# Patient Record
Sex: FEMALE | Race: BLACK OR AFRICAN AMERICAN | NOT HISPANIC OR LATINO | URBAN - METROPOLITAN AREA
[De-identification: names, ages, dates, MRNs, and addresses within clinical notes are randomized per-mention and may not be internally consistent; named-entity substitution may affect disease eponyms.]

---

## 2017-05-18 ENCOUNTER — ALLSCRIPTS OFFICE VISIT (OUTPATIENT)
Dept: OTHER | Facility: OTHER | Age: 37
End: 2017-05-18

## 2017-05-18 DIAGNOSIS — Z12.39 ENCOUNTER FOR OTHER SCREENING FOR MALIGNANT NEOPLASM OF BREAST: ICD-10-CM

## 2017-07-31 ENCOUNTER — HOSPITAL ENCOUNTER (OUTPATIENT)
Dept: MAMMOGRAPHY | Facility: MEDICAL CENTER | Age: 37
Discharge: HOME | End: 2017-07-31
Payer: COMMERCIAL

## 2017-07-31 DIAGNOSIS — Z12.39 ENCOUNTER FOR OTHER SCREENING FOR MALIGNANT NEOPLASM OF BREAST: ICD-10-CM

## 2017-07-31 PROCEDURE — G0202 SCR MAMMO BI INCL CAD: HCPCS

## 2017-08-01 ENCOUNTER — GENERIC CONVERSION - ENCOUNTER (OUTPATIENT)
Dept: OTHER | Facility: OTHER | Age: 37
End: 2017-08-01

## 2017-08-01 DIAGNOSIS — Z12.31 ENCOUNTER FOR SCREENING MAMMOGRAM FOR MALIGNANT NEOPLASM OF BREAST: ICD-10-CM

## 2017-08-01 DIAGNOSIS — R92.2 INCONCLUSIVE MAMMOGRAM: ICD-10-CM

## 2017-08-14 ENCOUNTER — GENERIC CONVERSION - ENCOUNTER (OUTPATIENT)
Dept: OTHER | Facility: OTHER | Age: 37
End: 2017-08-14

## 2018-01-11 NOTE — MISCELLANEOUS
Message   Date: 20 Oct 2016 8:55 AM EST, Recorded By: Que Kwong For: Roldan Mendoza   Caller: Zhang Armstrong, Virgilio   Phone: [phone] (Home)   Reason: Medical Complaint   Patient [facility]  She went there this am for abdominal pain  It is more in the middle pelvic area with vomiting  It just started this am  The Dr at [facility] advised to go to ER to be further evaluated and to get CT Scan  Patient called our office to be seen  I advised with her symptoms she should indeed go to ER  I advised it could be a number of things and that a CT Scan in the ER would be done quicker and she will have an answer sooner  Patient agreed and understood  Active Problems    1  Acute urinary tract infection (599 0) (N39 0)   2  Diet controlled gestational diabetes mellitus in third trimester (648 83) (O24 410)   3  Elderly primigravida, antepartum, third trimester (659 53) (O09 513)   4  Lower abdominal pain (789 09) (R10 30)   5  PPD screening test (V74 1) (Z11 1)   6  Pregnancy (V22 2) (Z33 1)    Current Meds   1  Ortho Tri-Cyclen Lo 0 18/0 215/0 25 MG-25 MCG Oral Tablet; Therapy: (Recorded:20Oct2016) to Recorded   2  Prenatal Vitamins TABS; TAKE 1 TABLET DAILY; Therapy: (Recorded:23Jun2015) to Recorded    Allergies    1  No Known Drug Allergies    2  Seasonal    Signatures   Electronically signed by : Marie Deleon, ; Oct 20 2016  8:59AM EST                       (Author)    Electronically signed by :  Kena Mcdonald DO; Oct 20 2016  1:12PM EST                       (Author)

## 2018-01-14 VITALS
HEIGHT: 70 IN | WEIGHT: 223.2 LBS | BODY MASS INDEX: 31.95 KG/M2 | DIASTOLIC BLOOD PRESSURE: 70 MMHG | RESPIRATION RATE: 18 BRPM | SYSTOLIC BLOOD PRESSURE: 106 MMHG | HEART RATE: 76 BPM | TEMPERATURE: 98.2 F

## 2018-01-15 NOTE — MISCELLANEOUS
Message  Message Free Text Note Form: FYI - patient called on-call provider at 11:45 PM  She states that she was awoken at 6 PM with urinary urgency and significant pain with urination  No blood in her urine  No fevers or chills  Patient did not have any antecedent symptoms until now  Patient states that she does have a history of frequent urinary tract infections  She was given a prescription for Keflex to be used after having sexual intercourse  Patient recently had a baby and is presently nursing  I advised that it is not usually office policy or procedure to call in antibiotics however given the current lateness of the evening and the fact that she is nursing an infant I would rather her not sit in an ER to wait to be treated for an uncomplicated urinary tract infection  Prescription for Keflex called into [facility] pharmacy which her  will   Patient was advised to increase her hydration  This will not be an issue with breast-feeding  She was advised that if her symptoms do not improve or worsening then she will need to call to the office for an appointment  Plan    1   Cephalexin 500 MG Oral Capsule; TAKE 1 CAPSULE 3 TIMES DAILY UNTIL GONE    Signatures   Electronically signed by : Brice Estrada DO; Feb 17 2016 12:11AM EST                       (Author)

## 2018-01-18 NOTE — RESULT NOTES
Discussion/Summary   Mammogram is stable- breasts are dense- repeat in one year  Verified Results  * MAMMO SCREENING BILATERAL W CAD [id number] 01:21PM Jose Carlos Coles Order Number: [id number]    - Patient Instructions: To schedule this appointment, please contact Central Scheduling at [phone]  Do not wear any perfume, powder, lotion or deodorant on breast or underarm area  Please bring your doctors order, referral (if needed) and insurance information with you on the day of the test  Failure to bring this information may result in this test being rescheduled  Arrive 15 minutes prior to your appointment time to register  On the day of your test, please bring any prior mammogram or breast studies with you that were not performed at a [facility]  Failure to bring prior exams may result in your test needing to be rescheduled  Test Name Result Flag Reference   MAMMO SCREENING BILATERAL W CAD (Report)     Patient History:   Patient had first child at age 38  Family history of breast cancer under age 48 in mother, breast    cancer at age 48 or over in paternal grandmother, ovarian cancer    under age 48 in maternal aunt  Patient has never smoked  Patient's BMI is 31 8  Reason for exam: screening, asymptomatic  Mammo Screening Bilateral W CAD: July 31, 2017 - Check In #:    [de-identified]   Bilateral CC and MLO view(s) were taken  Technologist: ROSA Gomez (ROSA)(M)   The breast tissue is heterogeneously dense, potentially limiting    the sensitivity of mammography  Patient risk, included in this    report, assists in determining the appropriate screening regimen    (such as 3-D mammography or the inclusion of automated breast    ultrasound or MRI)  3-D mammography may also remain indicated as    screening  Bilateral digital mammography was performed as a baseline study      No dominant soft tissue mass, architectural distortion or    suspicious calcifications are noted in either breast   The skin    and nipple contours are within normal limits  No evidence of malignancy  ACR BI-RADSï¾® Assessments: BiRad:1 - Negative     Recommendation:   Routine screening mammogram of both breasts in 1 year  Analyzed by CAD     The patient is scheduled in a reminder system  8-10% of cancers will be missed on mammography  Management of a    palpable abnormality must be based on clinical grounds  Patients   will be notified of their results via letter from our facility  Accredited by [employer] of [employer] and FDA  Transcription Location: [facility]: [id number]     Risk Value(s):   Tyrer-Cuzick 10 Year: 6 200%, Tyrer-Cuzick Lifetime: 42 100%,    Myriad Table: 7 2%, ENOC 5 Year: 0 7%, NCI Lifetime: 15 1%, MRS    : Based on personal and/or family history,    consideration of hereditary risk assessment may be warranted     Signed by:   Maura Herrera MD   7/31/17       Plan  Dense breast tissue on mammogram, Encounter for screening breast examination    · * MAMMO SCREENING BILATERAL W CAD; Status:Hold For - Scheduling; Requested  for:[id number];

## 2018-02-13 ENCOUNTER — OFFICE VISIT (OUTPATIENT)
Dept: FAMILY MEDICINE CLINIC | Facility: CLINIC | Age: 38
End: 2018-02-13
Payer: COMMERCIAL

## 2018-02-13 VITALS
HEIGHT: 70 IN | BODY MASS INDEX: 33.93 KG/M2 | SYSTOLIC BLOOD PRESSURE: 112 MMHG | HEART RATE: 60 BPM | RESPIRATION RATE: 18 BRPM | TEMPERATURE: 98.2 F | WEIGHT: 237 LBS | DIASTOLIC BLOOD PRESSURE: 72 MMHG

## 2018-02-13 DIAGNOSIS — E01.0 THYROMEGALY: ICD-10-CM

## 2018-02-13 DIAGNOSIS — E55.9 VITAMIN D DEFICIENCY: ICD-10-CM

## 2018-02-13 DIAGNOSIS — F41.8 SITUATIONAL ANXIETY: Primary | ICD-10-CM

## 2018-02-13 DIAGNOSIS — Z82.49 FAMILY HISTORY OF BRAIN ANEURYSM: ICD-10-CM

## 2018-02-13 PROCEDURE — 99214 OFFICE O/P EST MOD 30 MIN: CPT | Performed by: INTERNAL MEDICINE

## 2018-02-13 RX ORDER — CEPHALEXIN 250 MG/1
CAPSULE ORAL
Refills: 12 | COMMUNITY
Start: 2018-01-21 | End: 2018-02-13

## 2018-02-13 RX ORDER — NORGESTIMATE AND ETHINYL ESTRADIOL
1 KIT DAILY
Refills: 9 | COMMUNITY
Start: 2018-01-21 | End: 2018-05-17 | Stop reason: ALTCHOICE

## 2018-02-13 RX ORDER — ALPRAZOLAM 0.25 MG/1
TABLET ORAL
Qty: 10 TABLET | Refills: 0 | Status: SHIPPED | OUTPATIENT
Start: 2018-02-13 | End: 2018-04-09 | Stop reason: SDDI

## 2018-02-13 NOTE — PATIENT INSTRUCTIONS
alprazoalm prn for anxiety- no driving with meds- keep away from children  Obtain us of thyroid  Obtain mri/mra o fbrain to rule out aneurysm due to family hx  Follow up after studies

## 2018-02-13 NOTE — PROGRESS NOTES
Assessment/Plan: Armani Mehta is a 44 y o  female with:   Problem List Items Addressed This Visit     None        Subjective:   Armani Mehta is a 44 y o  female who presents today with a chief complaint of Anxiety and Fatigue    Patient here for follow up; She had a panic attack on Friday; She has had more anxiety lately- feels nervous  She has a strong family hx of mother with brain aneurysm- was told to have mri/mra to rule out aneurysm      Review of Systems   Constitutional: Negative  HENT: Negative  Eyes: Negative  Respiratory: Negative  Cardiovascular: Negative  Gastrointestinal: Negative  Endocrine: Negative  Genitourinary: Negative  Musculoskeletal: Negative  Skin: Negative  Allergic/Immunologic: Negative  Neurological: Negative  Hematological: Negative  Psychiatric/Behavioral: Negative for suicidal ideas  The patient is nervous/anxious (occasional anxiety- situational)  Objective:  /72   Pulse 60   Temp 98 2 °F (36 8 °C) (Tympanic)   Resp 18   Ht 5' 10" (1 778 m)   Wt 108 kg (237 lb)   BMI 34 01 kg/m²   Physical Exam   Constitutional: She is oriented to person, place, and time  She appears well-developed and well-nourished  HENT:   Head: Normocephalic and atraumatic  Right Ear: External ear normal    Left Ear: External ear normal    Nose: Nose normal    Mouth/Throat: Oropharynx is clear and moist    Eyes: Conjunctivae and EOM are normal  Pupils are equal, round, and reactive to light  Neck: Normal range of motion  Neck supple  No JVD present  No tracheal deviation present  Thyromegaly present  Fullness over isthums of thyroid   Cardiovascular: Normal rate, regular rhythm and normal heart sounds  Exam reveals no gallop  No murmur heard  Pulmonary/Chest: Effort normal and breath sounds normal    Abdominal: Soft  Bowel sounds are normal    Musculoskeletal: Normal range of motion  Lymphadenopathy:     She has no cervical adenopathy  Neurological: She is alert and oriented to person, place, and time  Skin: Skin is warm and dry  Psychiatric: Her behavior is normal  Judgment and thought content normal    Occasional anxiety   Nursing note and vitals reviewed  Histories Reviewed 2/13/2018:  Patient's Medications   New Prescriptions    No medications on file   Previous Medications    CEPHALEXIN (KEFLEX) 250 MG CAPSULE    TAKE 1 CAPSULE BY MOUTH AS NEEDED    TRI-LO-LISA 0 18/0 215/0 25 MG-25 MCG PER TABLET    Take 1 tablet by mouth daily   Modified Medications    No medications on file   Discontinued Medications    No medications on file     Allergies   Allergen Reactions    Pollen Extract      No past medical history on file  Social History     Social History    Marital status: /Civil Union     Spouse name: N/A    Number of children: N/A    Years of education: N/A     Occupational History    Not on file  Social History Main Topics    Smoking status: Never Smoker    Smokeless tobacco: Never Used    Alcohol use Yes      Comment: social    Drug use: No    Sexual activity: Not on file     Other Topics Concern    Not on file     Social History Narrative    No narrative on file     No future appointments  There are no Patient Instructions on file for this visit

## 2018-02-21 LAB
25(OH)D3+25(OH)D2 SERPL-MCNC: 24 NG/ML (ref 30–100)
ALBUMIN SERPL-MCNC: 4.3 G/DL (ref 3.5–5.5)
ALBUMIN/GLOB SERPL: 1.7 {RATIO} (ref 1.2–2.2)
ALP SERPL-CCNC: 56 IU/L (ref 39–117)
ALT SERPL-CCNC: 16 IU/L (ref 0–32)
AMBIG ABBREV DEFAULT: NORMAL
AST SERPL-CCNC: 15 IU/L (ref 0–40)
BASOPHILS # BLD AUTO: 0 X10E3/UL (ref 0–0.2)
BASOPHILS NFR BLD AUTO: 0 %
BILIRUB SERPL-MCNC: 0.2 MG/DL (ref 0–1.2)
BUN SERPL-MCNC: 10 MG/DL (ref 6–20)
BUN/CREAT SERPL: 13 (ref 9–23)
CALCIUM SERPL-MCNC: 10 MG/DL (ref 8.7–10.2)
CHLORIDE SERPL-SCNC: 101 MMOL/L (ref 96–106)
CO2 SERPL-SCNC: 26 MMOL/L (ref 18–29)
CREAT SERPL-MCNC: 0.78 MG/DL (ref 0.57–1)
EOSINOPHIL # BLD AUTO: 0.1 X10E3/UL (ref 0–0.4)
EOSINOPHIL NFR BLD AUTO: 1 %
ERYTHROCYTE [DISTWIDTH] IN BLOOD BY AUTOMATED COUNT: 12.9 % (ref 12.3–15.4)
GLOBULIN SER-MCNC: 2.6 G/DL (ref 1.5–4.5)
GLUCOSE SERPL-MCNC: 91 MG/DL (ref 65–99)
HCT VFR BLD AUTO: 40 % (ref 34–46.6)
HGB BLD-MCNC: 14.1 G/DL (ref 11.1–15.9)
IMM GRANULOCYTES # BLD: 0 X10E3/UL (ref 0–0.1)
IMM GRANULOCYTES NFR BLD: 0 %
LYMPHOCYTES # BLD AUTO: 2.1 X10E3/UL (ref 0.7–3.1)
LYMPHOCYTES NFR BLD AUTO: 29 %
MCH RBC QN AUTO: 31.1 PG (ref 26.6–33)
MCHC RBC AUTO-ENTMCNC: 35.3 G/DL (ref 31.5–35.7)
MCV RBC AUTO: 88 FL (ref 79–97)
MONOCYTES # BLD AUTO: 0.4 X10E3/UL (ref 0.1–0.9)
MONOCYTES NFR BLD AUTO: 5 %
NEUTROPHILS # BLD AUTO: 4.7 X10E3/UL (ref 1.4–7)
NEUTROPHILS NFR BLD AUTO: 65 %
PLATELET # BLD AUTO: 243 X10E3/UL (ref 150–379)
POTASSIUM SERPL-SCNC: 4.4 MMOL/L (ref 3.5–5.2)
PROT SERPL-MCNC: 6.9 G/DL (ref 6–8.5)
RBC # BLD AUTO: 4.53 X10E6/UL (ref 3.77–5.28)
SL AMB EGFR AFRICAN AMERICAN: 112
SL AMB EGFR NON AFRICAN AMERICAN: 97
SODIUM SERPL-SCNC: 139 MMOL/L (ref 134–144)
TSH SERPL DL<=0.005 MIU/L-ACNC: 1.64 UIU/ML (ref 0.45–4.5)
WBC # BLD AUTO: 7.3 X10E3/UL (ref 3.4–10.8)

## 2018-02-23 ENCOUNTER — TELEPHONE (OUTPATIENT)
Dept: FAMILY MEDICINE CLINIC | Facility: CLINIC | Age: 38
End: 2018-02-23

## 2018-02-23 NOTE — TELEPHONE ENCOUNTER
Call pt- labs are all very good but vit d is a little low- get some sun when it comes back out and add vitamin D [id number] iu daily

## 2018-03-02 ENCOUNTER — HOSPITAL ENCOUNTER (OUTPATIENT)
Dept: ULTRASOUND IMAGING | Facility: MEDICAL CENTER | Age: 38
Discharge: HOME | End: 2018-03-02
Payer: COMMERCIAL

## 2018-03-02 DIAGNOSIS — E01.0 THYROMEGALY: ICD-10-CM

## 2018-03-02 PROCEDURE — 76536 US EXAM OF HEAD AND NECK: CPT

## 2018-03-03 ENCOUNTER — HOSPITAL ENCOUNTER (OUTPATIENT)
Dept: MRI IMAGING | Facility: HOSPITAL | Age: 38
Discharge: HOME | End: 2018-03-03
Payer: COMMERCIAL

## 2018-03-03 DIAGNOSIS — Z82.49 FAMILY HISTORY OF BRAIN ANEURYSM: ICD-10-CM

## 2018-03-03 PROCEDURE — 70544 MR ANGIOGRAPHY HEAD W/O DYE: CPT

## 2018-03-03 PROCEDURE — 70551 MRI BRAIN STEM W/O DYE: CPT

## 2018-03-05 ENCOUNTER — TELEPHONE (OUTPATIENT)
Dept: FAMILY MEDICINE CLINIC | Facility: CLINIC | Age: 38
End: 2018-03-05

## 2018-03-05 DIAGNOSIS — R90.89 ABNORMAL BRAIN MRI: Primary | ICD-10-CM

## 2018-03-05 NOTE — TELEPHONE ENCOUNTER
Call pt- mri of brain is normal but the pituitary may have some fluid in it- - they are recommending a dedicated mri with contrast to see the pituitary gland better -  I will put order in for it

## 2018-03-08 ENCOUNTER — TELEPHONE (OUTPATIENT)
Dept: FAMILY MEDICINE CLINIC | Facility: CLINIC | Age: 38
End: 2018-03-08

## 2018-03-08 NOTE — TELEPHONE ENCOUNTER
LMOM to call office back  I believe she wanted to speak to a nurse about her MRI results and had questions also  Not sure if anyone spoke to her about it            Maria C Adler, [address] [facility]             Thyroid size is normal; one small cyst but no biopsy needed!  Great news!!

## 2018-03-08 NOTE — TELEPHONE ENCOUNTER
Spoke with patient  She was wanting results of MRI and MRA  Relayed MRI results to patient  Script for dedicated mri with contrast mailed to patient, at her request      Patient asking about MRA results

## 2018-04-09 ENCOUNTER — OFFICE VISIT (OUTPATIENT)
Dept: URGENT CARE | Age: 38
End: 2018-04-09

## 2018-04-09 ENCOUNTER — APPOINTMENT (OUTPATIENT)
Dept: RADIOLOGY | Age: 38
End: 2018-04-09
Payer: COMMERCIAL

## 2018-04-09 VITALS
DIASTOLIC BLOOD PRESSURE: 58 MMHG | RESPIRATION RATE: 20 BRPM | OXYGEN SATURATION: 98 % | HEIGHT: 72 IN | TEMPERATURE: 98.9 F | WEIGHT: 240 LBS | HEART RATE: 80 BPM | SYSTOLIC BLOOD PRESSURE: 105 MMHG | BODY MASS INDEX: 32.51 KG/M2

## 2018-04-09 DIAGNOSIS — M54.50 ACUTE LEFT-SIDED LOW BACK PAIN WITHOUT SCIATICA: ICD-10-CM

## 2018-04-09 DIAGNOSIS — M54.50 ACUTE LEFT-SIDED LOW BACK PAIN WITHOUT SCIATICA: Primary | ICD-10-CM

## 2018-04-09 PROCEDURE — 73502 X-RAY EXAM HIP UNI 2-3 VIEWS: CPT

## 2018-04-09 PROCEDURE — G0382 LEV 3 HOSP TYPE B ED VISIT: HCPCS | Performed by: FAMILY MEDICINE

## 2018-04-09 PROCEDURE — 99203 OFFICE O/P NEW LOW 30 MIN: CPT | Performed by: FAMILY MEDICINE

## 2018-04-09 RX ORDER — PREDNISONE 20 MG/1
60 TABLET ORAL DAILY
Qty: 15 TABLET | Refills: 0 | Status: SHIPPED | OUTPATIENT
Start: 2018-04-09 | End: 2018-04-14

## 2018-04-09 RX ORDER — CYCLOBENZAPRINE HCL 5 MG
5 TABLET ORAL 3 TIMES DAILY PRN
Qty: 15 TABLET | Refills: 0 | Status: SHIPPED | OUTPATIENT
Start: 2018-04-09 | End: 2018-04-18 | Stop reason: ALTCHOICE

## 2018-04-09 RX ORDER — CEPHALEXIN 250 MG/1
CAPSULE ORAL
COMMUNITY
Start: 2018-03-01 | End: 2018-04-09 | Stop reason: SDDI

## 2018-04-09 NOTE — PROGRESS NOTES
[facility]        NAME: Brittany Shafer is a 44 y o  female  : 1980    MRN: [id number]  DATE: 2018  TIME: 7:37 PM    Assessment and Plan   Acute left-sided low back pain without sciatica [M54 5]  1  Acute left-sided low back pain without sciatica  XR hip/pelv 2-3 vws left if performed    predniSONE 20 mg tablet    cyclobenzaprine (FLEXERIL) 5 mg tablet         Patient Instructions       Follow up with PCP in 3-5 days  Proceed to  ER if symptoms worsen  Chief Complaint     Chief Complaint   Patient presents with    Back Pain     lower back pain ardiating left leg with some numbness since 2 weeks  History of Present Illness       43 yo F presents with 2 week history of worsening low back and hip pain  Mild pain b/l but patient states that the pain is worse on the L  Patient states that the pain is a dull 4-5/10 but when she makes certain movements she gets sharp stabbing 10/10 pain  No acute trauma or injury  Worse with sitting for long periods of time without stretching  No prior injury, but states that this is a chronic issue that has come and gone for a while  States sometimes she gets tingling in L thigh but right now she doesn't have any of those symptoms  No flank pain, no dysuria, no hematuria, no NVD  Review of Systems   Review of Systems   Constitutional: Negative for chills, fatigue and fever  HENT: Negative  Eyes: Negative  Respiratory: Negative for cough, chest tightness and shortness of breath  Cardiovascular: Negative for chest pain and palpitations  Gastrointestinal: Negative for diarrhea, nausea and vomiting  Endocrine: Negative  Genitourinary: Negative for dysuria, flank pain and hematuria  Musculoskeletal: Positive for back pain  Negative for gait problem, myalgias and neck pain  Skin: Negative for pallor and rash  Allergic/Immunologic: Negative  Neurological: Negative for dizziness, syncope and headaches  Hematological: Negative  Psychiatric/Behavioral: Negative  Current Medications       Current Outpatient Prescriptions:     cyclobenzaprine (FLEXERIL) 5 mg tablet, Take 1 tablet (5 mg total) by mouth 3 (three) times a day as needed for muscle spasms for up to 5 days, Disp: 15 tablet, Rfl: 0    predniSONE 20 mg tablet, Take 3 tablets (60 mg total) by mouth daily for 5 days, Disp: 15 tablet, Rfl: 0    TRI-LO-LISA 0 18/0 215/0 25 MG-25 MCG per tablet, Take 1 tablet by mouth daily, Disp: , Rfl: 9    Current Allergies     Allergies as of 04/09/2018 - Reviewed 04/09/2018   Allergen Reaction Noted    Pollen extract  06/23/2015            The following portions of the patient's history were reviewed and updated as appropriate: allergies, current medications, past family history, past medical history, past social history, past surgical history and problem list      History reviewed  No pertinent past medical history  History reviewed  No pertinent surgical history  History reviewed  No pertinent family history  Medications have been verified  Objective   /58   Pulse 80   Temp 98 9 °F (37 2 °C) (Temporal)   Resp 20   Ht 6' (1 829 m)   Wt 109 kg (240 lb)   SpO2 98%   BMI 32 55 kg/m²        Physical Exam     Physical Exam   Constitutional: She is oriented to person, place, and time  She appears well-developed and well-nourished  No distress  HENT:   Head: Normocephalic and atraumatic  Right Ear: External ear normal    Left Ear: External ear normal    Nose: Nose normal    Mouth/Throat: Oropharynx is clear and moist  No oropharyngeal exudate  Eyes: Conjunctivae and EOM are normal  Pupils are equal, round, and reactive to light  Cardiovascular: Normal rate, regular rhythm, normal heart sounds and intact distal pulses  Pulmonary/Chest: Effort normal and breath sounds normal  No respiratory distress  She has no wheezes  She has no rales  Abdominal: Soft   She exhibits no distension and no mass  There is no tenderness  There is no rebound and no guarding  Musculoskeletal: Normal range of motion  She exhibits no edema, tenderness or deformity  No midline point tenderness to spine  Full ROM  Slight muscle tension noted to L lumbar muscles  Full ROM of hips b/l  Patients pain is reproduced with flexion and internal and external rotation of L hip  No crepitus  Normal gait  Neurological: She is alert and oriented to person, place, and time  Skin: Skin is warm  No rash noted  She is not diaphoretic  Nursing note and vitals reviewed  XR provider read  No acute injury seen to L hip  Will treat conservatively and have pt follow up with PCP in a week  She agrees to this plan  Understands indications to go to ER

## 2018-04-09 NOTE — PATIENT INSTRUCTIONS
Take medications as prescribed  Follow up with family doctor in one week  Go to ER if new or worsening symptoms occur  Acute Low Back Pain   AMBULATORY CARE:   Acute low back pain  is sudden discomfort in your lower back area that lasts for up to 6 weeks  The discomfort makes it difficult to tolerate activity  Common symptoms include the following:   · Back stiffness or spasms    · Pain down the back or side of one leg    · Holding yourself in an unusual position or posture to decrease your back pain    · Not being able to find a sitting position that is comfortable    · Slow increase in your pain for 24 to 48 hours after you stress your back    · Tenderness on your lower back or severe pain when you move your back  Seek immediate care for the following symptoms:   · Severe pain    · Sudden stiffness and heaviness in both buttocks down to both legs    · Numbness or weakness in one leg, or pain in both legs    · Numbness in your genital area or across your lower back    · Unable to control your urine or bowel movements  Contact your healthcare provider if:   · You have a fever  · You have pain at night or when you rest     · Your pain does not get better with treatment  · You have pain that worsens when you cough or sneeze  · You suddenly feel something pop or snap in your back  · You have questions or concerns about your condition or care  The goal of treatment for acute low back pain  is to relieve your pain and help you tolerate activity  Most people with acute lower back pain get better within 4 to 6 weeks  You may need any of the following:  · Acetaminophen  decreases pain  It is available without a doctor's order  Ask how much to take and how often to take it  Follow directions  Acetaminophen can cause liver damage if not taken correctly  · NSAIDs  help decrease swelling and pain  This medicine is available with or without a doctor's order   NSAIDs can cause stomach bleeding or kidney problems in certain people  If you take blood thinner medicine, always ask your healthcare provider if NSAIDs are safe for you  Always read the medicine label and follow directions  · Prescription pain medicine  may be given  Ask your healthcare provider how to take this medicine safely  · Muscle relaxers  decrease pain by relaxing the muscles in your lower spine  Manage your symptoms:   · Stay active  as much as you can without causing more pain  Bed rest could make your back pain worse  Start with some light exercises such as walking  Avoid heavy lifting until your pain is gone  Ask for more information about the activities or exercises that are right for you  · Ice  helps decrease swelling, pain, and muscle spams  Put crushed ice in a plastic bag  Cover it with a towel  Place it on your lower back for 20 to 30 minutes every 2 hours  Do this for about 2 to 3 days after your pain starts, or as directed  · Heat  helps decrease pain and muscle spasms  Start to use heat after treatment with ice has stopped  Use a small towel dampened with warm water or a heating pad, or sit in a warm bath  Apply heat on the area for 20 to 30 minutes every 2 hours for as many days as directed  Alternate heat and ice  Prevent acute low back pain:   · Use proper body mechanics  ¨ Bend at the hips and knees when you  objects  Do not bend from the waist  Use your leg muscles as you lift the load  Do not use your back  Keep the object close to your chest as you lift it  Try not to twist or lift anything above your waist     ¨ Change your position often when you stand for long periods of time  Rest one foot on a small box or footrest, and then switch to the other foot often  ¨ Try not to sit for long periods of time  When you do, sit in a straight-backed chair with your feet flat on the floor  Never reach, pull, or push while you are sitting  · Do exercises that strengthen your back muscles    Warm up before you exercise  Ask your healthcare provider the best exercises for you  · Maintain a healthy weight  Ask your healthcare provider how much you should weigh  Ask him to help you create a weight loss plan if you are overweight  Follow up with your healthcare provider as directed:  Return for a follow-up visit if you still have pain after 1 to 3 weeks of treatment  You may need to visit an orthopedist if your back pain lasts more than 12 weeks  Write down your questions so you remember to ask them during your visits  © 2017 [employer] Information is for End User's use only and may not be sold, redistributed or otherwise used for commercial purposes  All illustrations and images included in CareNotes® are the copyrighted property of A D [employer] M [employer] Inc  or [employer]  The above information is an  only  It is not intended as medical advice for individual conditions or treatments  Talk to your doctor, nurse or pharmacist before following any medical regimen to see if it is safe and effective for you

## 2018-04-10 ENCOUNTER — TELEPHONE (OUTPATIENT)
Dept: FAMILY MEDICINE CLINIC | Facility: CLINIC | Age: 38
End: 2018-04-10

## 2018-04-10 NOTE — TELEPHONE ENCOUNTER
Patient called  She went to [address] [place] [address] Urgent care last night for low back pain and was given prednisone and cyclobenzaprine   She does not want to take the prednisone unless you tell her it is ok

## 2018-04-10 NOTE — TELEPHONE ENCOUNTER
Bala Euceda have her take 1/2 tab three times a day today  Then tomorrow the [facility]  Thursday take 1/2 tab twice a day - same on Friday  Sat take 1/2 tab once a day- same on sun and stop   Take it with food  No advil aleve ibuprofen with this

## 2018-04-18 ENCOUNTER — OFFICE VISIT (OUTPATIENT)
Dept: FAMILY MEDICINE CLINIC | Facility: CLINIC | Age: 38
End: 2018-04-18
Payer: COMMERCIAL

## 2018-04-18 VITALS
TEMPERATURE: 98 F | SYSTOLIC BLOOD PRESSURE: 104 MMHG | HEART RATE: 88 BPM | DIASTOLIC BLOOD PRESSURE: 70 MMHG | BODY MASS INDEX: 32.06 KG/M2 | WEIGHT: 236.4 LBS | RESPIRATION RATE: 18 BRPM

## 2018-04-18 DIAGNOSIS — M54.16 LUMBAR RADICULAR PAIN: Primary | ICD-10-CM

## 2018-04-18 PROCEDURE — 99213 OFFICE O/P EST LOW 20 MIN: CPT | Performed by: INTERNAL MEDICINE

## 2018-04-18 PROCEDURE — 96372 THER/PROPH/DIAG INJ SC/IM: CPT | Performed by: INTERNAL MEDICINE

## 2018-04-18 RX ORDER — CEPHALEXIN 250 MG/1
1 CAPSULE ORAL AS NEEDED
COMMUNITY
Start: 2014-01-20 | End: 2019-12-30 | Stop reason: ALTCHOICE

## 2018-04-18 RX ORDER — OXYCODONE HYDROCHLORIDE AND ACETAMINOPHEN 5; 325 MG/1; MG/1
1 TABLET ORAL EVERY 6 HOURS PRN
Qty: 60 TABLET | Refills: 0 | Status: SHIPPED | OUTPATIENT
Start: 2018-04-18 | End: 2018-07-09 | Stop reason: ALTCHOICE

## 2018-04-18 RX ORDER — KETOROLAC TROMETHAMINE 30 MG/ML
60 INJECTION, SOLUTION INTRAMUSCULAR; INTRAVENOUS ONCE
Status: COMPLETED | OUTPATIENT
Start: 2018-04-18 | End: 2018-04-18

## 2018-04-18 RX ADMIN — KETOROLAC TROMETHAMINE 60 MG: 30 INJECTION, SOLUTION INTRAMUSCULAR; INTRAVENOUS at 12:12
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
## 2018-04-24 ENCOUNTER — HOSPITAL ENCOUNTER (OUTPATIENT)
Dept: MRI IMAGING | Facility: HOSPITAL | Age: 38
Discharge: HOME | End: 2018-04-24
Payer: COMMERCIAL

## 2018-04-24 DIAGNOSIS — M54.16 LUMBAR RADICULAR PAIN: ICD-10-CM

## 2018-04-24 PROCEDURE — 72148 MRI LUMBAR SPINE W/O DYE: CPT

## 2018-04-26 ENCOUNTER — TELEPHONE (OUTPATIENT)
Dept: FAMILY MEDICINE CLINIC | Facility: CLINIC | Age: 38
End: 2018-04-26

## 2018-04-26 DIAGNOSIS — M54.16 LUMBAR RADICULAR PAIN: Primary | ICD-10-CM

## 2018-04-26 NOTE — TELEPHONE ENCOUNTER
Ramon for pt to return call      ----- Message from Alireza Bedoya DO sent at 4/25/2018  1:04 PM EDT -----  Please call the patient regarding her abnormal result  Mri shows  Bulging disc- it doesn't look like it is pushing on the nerve, but it is causing the pain   If she is no better we can refer her to pain mgt for an injeciton- let me know

## 2018-05-17 ENCOUNTER — OFFICE VISIT (OUTPATIENT)
Dept: PAIN MEDICINE | Facility: MEDICAL CENTER | Age: 38
End: 2018-05-17
Payer: COMMERCIAL

## 2018-05-17 VITALS
SYSTOLIC BLOOD PRESSURE: 108 MMHG | WEIGHT: 245.4 LBS | DIASTOLIC BLOOD PRESSURE: 70 MMHG | BODY MASS INDEX: 33.24 KG/M2 | HEART RATE: 64 BPM | RESPIRATION RATE: 14 BRPM | HEIGHT: 72 IN

## 2018-05-17 DIAGNOSIS — S39.012A LUMBAR STRAIN, INITIAL ENCOUNTER: Primary | ICD-10-CM

## 2018-05-17 DIAGNOSIS — M54.16 LUMBAR RADICULAR PAIN: ICD-10-CM

## 2018-05-17 PROCEDURE — 99204 OFFICE O/P NEW MOD 45 MIN: CPT | Performed by: PHYSICAL MEDICINE & REHABILITATION

## 2018-05-17 RX ORDER — MELOXICAM 15 MG/1
15 TABLET ORAL DAILY
Qty: 30 TABLET | Refills: 0 | Status: SHIPPED | OUTPATIENT
Start: 2018-05-17 | End: 2018-07-09 | Stop reason: SDDI

## 2018-05-17 RX ORDER — TIZANIDINE 4 MG/1
4 TABLET ORAL EVERY 8 HOURS PRN
Qty: 30 TABLET | Refills: 0 | Status: SHIPPED | OUTPATIENT
Start: 2018-05-17 | End: 2018-07-09 | Stop reason: ALTCHOICE
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
## 2018-05-25 ENCOUNTER — EVALUATION (OUTPATIENT)
Dept: PHYSICAL THERAPY | Facility: CLINIC | Age: 38
End: 2018-05-25
Payer: COMMERCIAL

## 2018-05-25 DIAGNOSIS — S39.012A LUMBAR STRAIN, INITIAL ENCOUNTER: ICD-10-CM

## 2018-05-25 DIAGNOSIS — S39.012A STRAIN OF LUMBAR REGION, INITIAL ENCOUNTER: Primary | ICD-10-CM

## 2018-05-25 PROCEDURE — 97161 PT EVAL LOW COMPLEX 20 MIN: CPT

## 2018-05-25 PROCEDURE — G8981 BODY POS CURRENT STATUS: HCPCS

## 2018-05-25 PROCEDURE — G8982 BODY POS GOAL STATUS: HCPCS
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
## 2018-06-01 ENCOUNTER — OFFICE VISIT (OUTPATIENT)
Dept: PHYSICAL THERAPY | Facility: CLINIC | Age: 38
End: 2018-06-01
Payer: COMMERCIAL

## 2018-06-01 DIAGNOSIS — S39.012A STRAIN OF LUMBAR REGION, INITIAL ENCOUNTER: Primary | ICD-10-CM

## 2018-06-01 DIAGNOSIS — S39.012A LUMBAR STRAIN, INITIAL ENCOUNTER: ICD-10-CM

## 2018-06-01 PROCEDURE — 97110 THERAPEUTIC EXERCISES: CPT

## 2018-06-01 PROCEDURE — 97140 MANUAL THERAPY 1/> REGIONS: CPT

## 2018-06-01 NOTE — PROGRESS NOTES
Daily Note     Today's date: 2018  Patient name: Yobany Li  : 1980  MRN: [id number]  Referring provider: David Bradshaw DO  Dx:   Encounter Diagnosis     ICD-10-CM    1  Strain of lumbar region, initial encounter S39 012A    2  Lumbar strain, initial encounter S39 012A                   Subjective: Patient admits she has not been consistent with her home exercises, stating she started out performing them for the first couple of days after IE, but has since stopped  She states she woke up this morning with increased stiffness in her low back, which she had to stretch out so she could get out of bed  Objective: See treatment diary below  Assessment: Tolerated treatment fair  Patient progresses slowly through exercises, and requires encouragement to complete them  No noted increase in discomfort with exercises or stretches  Good response to manual HS/piriformis stretches  Patient demonstrated fatigue post treatment and would benefit from continued PT      Plan: Continue per plan of care    Progress treatment as tolerated            Precautions: none     Daily Treatment Diary         Manuals                       HS, piriformis stretch  AN                                                                                             Exercise Diary                        Bike 7 min                     LB stretch pball rollout 10"x10                     HS stretch 30"x3                     Piriformis stretch 30"x3                     LTR 10"x10                     SKTC 30"x3                     PPT 3"x20                     PPT w/ march NP                     PPT w/ heel slides NP                     PPT with iso hip abd/add NP                     Glute bridges NP                     Clam shells NP                                                                                                                                                                                                                                                                     Modalities                       MHP prn 10 min

## 2018-06-04 ENCOUNTER — OFFICE VISIT (OUTPATIENT)
Dept: PHYSICAL THERAPY | Facility: CLINIC | Age: 38
End: 2018-06-04
Payer: COMMERCIAL

## 2018-06-04 DIAGNOSIS — S39.012A STRAIN OF LUMBAR REGION, INITIAL ENCOUNTER: Primary | ICD-10-CM

## 2018-06-04 DIAGNOSIS — S39.012A LUMBAR STRAIN, INITIAL ENCOUNTER: ICD-10-CM

## 2018-06-04 PROCEDURE — 97112 NEUROMUSCULAR REEDUCATION: CPT

## 2018-06-04 PROCEDURE — 97110 THERAPEUTIC EXERCISES: CPT

## 2018-06-04 NOTE — PROGRESS NOTES
Daily Note     Today's date: 2018  Patient name: Raghu Villatoro  : 1980  MRN: [id number]  Referring provider: Franc Nagy DO  Dx:   Encounter Diagnosis     ICD-10-CM    1  Strain of lumbar region, initial encounter S39 012A    2  Lumbar strain, initial encounter S39 012A                   Subjective: Patient reports an ache in the left LB this morning and indicates she took it easy over the weekend  Patient admits she did not do her HEP  OBJECTIVE:  Manuals                     HS, piriformis stretch  AN                                                                                             Exercise Diary                        Bike 7 min  7 min                   LB stretch pball rollout 10"x10  10"x10                   HS stretch 30"x3  30"x3                   Piriformis stretch 30"x3  30"x3                   LTR 10"x10  10"x10                   SKTC 30"x3  30"x3                   PPT 3"x20  3"x20                   PPT w/ march NP  x10 ea                   PPT w/ heel slides NP  x10 ea                   PPT with iso hip abd/add NP  5"x15  Ea                    Glute bridges NP  NV                   Clam shells NP  NV                                                                                                                                                                                                                                                                   Modalities                       MHP prn 10 min  10 min                                                 Assessment: Patient tolerated treatment well without complaints and indicated she had no pain at the end of the exercise session  Patient would benefit from continued therapy  Plan: Continue therapy as tolerated per plan of care

## 2018-06-06 ENCOUNTER — TELEPHONE (OUTPATIENT)
Dept: FAMILY MEDICINE CLINIC | Facility: CLINIC | Age: 38
End: 2018-06-06

## 2018-06-06 NOTE — TELEPHONE ENCOUNTER
Pt calling to ask if you would be willing to write a script for a lower lumbar support chair for her to use at work   Script to be faxed to [phone] Attention: Isidoro Spencer

## 2018-06-07 NOTE — TELEPHONE ENCOUNTER
I dont see this in the emr anywhere- if you can find it let me know  If not I will just write it or put it in a letter-

## 2018-06-11 ENCOUNTER — OFFICE VISIT (OUTPATIENT)
Dept: PHYSICAL THERAPY | Facility: CLINIC | Age: 38
End: 2018-06-11
Payer: COMMERCIAL

## 2018-06-11 DIAGNOSIS — S39.012A LUMBAR STRAIN, INITIAL ENCOUNTER: ICD-10-CM

## 2018-06-11 DIAGNOSIS — S39.012A STRAIN OF LUMBAR REGION, INITIAL ENCOUNTER: Primary | ICD-10-CM

## 2018-06-11 PROCEDURE — 97110 THERAPEUTIC EXERCISES: CPT

## 2018-06-11 PROCEDURE — 97112 NEUROMUSCULAR REEDUCATION: CPT
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
## 2018-06-18 ENCOUNTER — OFFICE VISIT (OUTPATIENT)
Dept: PHYSICAL THERAPY | Facility: CLINIC | Age: 38
End: 2018-06-18
Payer: COMMERCIAL

## 2018-06-18 DIAGNOSIS — S39.012A LUMBAR STRAIN, INITIAL ENCOUNTER: ICD-10-CM

## 2018-06-18 DIAGNOSIS — S39.012A STRAIN OF LUMBAR REGION, INITIAL ENCOUNTER: Primary | ICD-10-CM

## 2018-06-18 PROCEDURE — 97140 MANUAL THERAPY 1/> REGIONS: CPT

## 2018-06-18 PROCEDURE — 97110 THERAPEUTIC EXERCISES: CPT

## 2018-06-18 PROCEDURE — 97112 NEUROMUSCULAR REEDUCATION: CPT

## 2018-06-18 NOTE — PROGRESS NOTES
Daily Note     Today's date: 2018  Patient name: Ольга Soto  : 1980  MRN: [id number]  Referring provider: Tremaine Fierro DO  Dx:   Encounter Diagnosis     ICD-10-CM    1  Strain of lumbar region, initial encounter S39 012A    2  Lumbar strain, initial encounter S39 012A                   Subjective: Patient reports she continues to have some slight discomfort in low back, however not as intense as it was previously  She describes it as a dull ache this morning, contributing it to being stiffer in the mornings  Objective: See treatment diary below  Assessment: Patient tolerated treatment well  Good response to manual piriformis stretch, noting decreased pain in low back after stretch  Patient would benefit from continued therapy to improve hip strength and endurance, as well as core strength to improve lumbar support         Plan: Continue therapy as tolerated per plan of care        Precautions: none    Manuals                 HS, piriformis stretch  AN    NP  AN                                                                                       Exercise Diary                        Bike 7 min 7 min NP 8 min               LB stretch pball rollout 10"x10 10" x10 10"x10 10"  x10               HS stretch 30"x3 30"x3 30"x3 30"x3               Piriformis stretch 30"x3 30"x3 30"x3 30"x3               LTR 10"x10 10" x10 10"x10 10"  x10               SKTC 30"x3 30"x3 NP 30"x3               PPT 3"x20 3"x20 3"x30 3"x30               PPT w/ march NP x10 ea 2 min, 3" hold 3" hold, 2 min               PPT w/ heel slides NP x10 ea NP NP               PPT with iso hip abd/add NP 5"x15  Ea  NV 3" hold, 2 min               Glute bridges NP NV 2x10 2x10               Clam shells NP NV NV NV               Standing 3 way SLR     2# 2x10 each NV               Step ups     6" x20 each NV                                                                                                                                                                                                               Modalities                       MHP prn 10 min  10 min 10 min 10 min

## 2018-06-20 ENCOUNTER — TELEPHONE (OUTPATIENT)
Dept: PAIN MEDICINE | Facility: CLINIC | Age: 38
End: 2018-06-20

## 2018-06-20 NOTE — TELEPHONE ENCOUNTER
Re faxed to [facility] after Atty called asking for status

## 2018-06-25 ENCOUNTER — OFFICE VISIT (OUTPATIENT)
Dept: PHYSICAL THERAPY | Facility: CLINIC | Age: 38
End: 2018-06-25
Payer: COMMERCIAL

## 2018-06-25 DIAGNOSIS — S39.012A LUMBAR STRAIN, INITIAL ENCOUNTER: ICD-10-CM

## 2018-06-25 DIAGNOSIS — S39.012A STRAIN OF LUMBAR REGION, INITIAL ENCOUNTER: Primary | ICD-10-CM

## 2018-06-25 PROCEDURE — 97140 MANUAL THERAPY 1/> REGIONS: CPT | Performed by: PHYSICAL THERAPY ASSISTANT

## 2018-06-25 PROCEDURE — 97110 THERAPEUTIC EXERCISES: CPT | Performed by: PHYSICAL THERAPY ASSISTANT
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
## 2018-07-09 ENCOUNTER — TRANSCRIBE ORDERS (OUTPATIENT)
Dept: ADMINISTRATIVE | Facility: HOSPITAL | Age: 38
End: 2018-07-09

## 2018-07-09 ENCOUNTER — OFFICE VISIT (OUTPATIENT)
Dept: PAIN MEDICINE | Facility: MEDICAL CENTER | Age: 38
End: 2018-07-09
Payer: COMMERCIAL

## 2018-07-09 ENCOUNTER — APPOINTMENT (OUTPATIENT)
Dept: LAB | Facility: MEDICAL CENTER | Age: 38
End: 2018-07-09
Payer: COMMERCIAL

## 2018-07-09 VITALS
HEIGHT: 71 IN | WEIGHT: 248.6 LBS | RESPIRATION RATE: 14 BRPM | SYSTOLIC BLOOD PRESSURE: 104 MMHG | DIASTOLIC BLOOD PRESSURE: 58 MMHG | HEART RATE: 68 BPM | BODY MASS INDEX: 34.8 KG/M2

## 2018-07-09 DIAGNOSIS — Z32.01 PREGNANCY EXAMINATION OR TEST, POSITIVE RESULT: ICD-10-CM

## 2018-07-09 DIAGNOSIS — Z32.01 PREGNANCY EXAMINATION OR TEST, POSITIVE RESULT: Primary | ICD-10-CM

## 2018-07-09 DIAGNOSIS — S39.012D STRAIN OF LUMBAR REGION, SUBSEQUENT ENCOUNTER: Primary | ICD-10-CM

## 2018-07-09 DIAGNOSIS — M51.26 LUMBAR DISC HERNIATION: ICD-10-CM

## 2018-07-09 DIAGNOSIS — M54.16 LUMBAR RADICULOPATHY: ICD-10-CM

## 2018-07-09 LAB — B-HCG SERPL-ACNC: ABNORMAL MIU/ML

## 2018-07-09 PROCEDURE — 36415 COLL VENOUS BLD VENIPUNCTURE: CPT

## 2018-07-09 PROCEDURE — 84702 CHORIONIC GONADOTROPIN TEST: CPT

## 2018-07-09 PROCEDURE — 99213 OFFICE O/P EST LOW 20 MIN: CPT | Performed by: NURSE PRACTITIONER

## 2018-07-09 RX ORDER — NORGESTIMATE AND ETHINYL ESTRADIOL
KIT
COMMUNITY
Start: 2018-07-01 | End: 2019-12-30 | Stop reason: ALTCHOICE
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
## 2018-07-11 ENCOUNTER — OFFICE VISIT (OUTPATIENT)
Dept: PHYSICAL THERAPY | Facility: CLINIC | Age: 38
End: 2018-07-11
Payer: COMMERCIAL

## 2018-07-11 DIAGNOSIS — S39.012A LUMBAR STRAIN, INITIAL ENCOUNTER: ICD-10-CM

## 2018-07-11 DIAGNOSIS — S39.012A STRAIN OF LUMBAR REGION, INITIAL ENCOUNTER: Primary | ICD-10-CM

## 2018-07-11 PROCEDURE — 97112 NEUROMUSCULAR REEDUCATION: CPT

## 2018-07-11 PROCEDURE — 97110 THERAPEUTIC EXERCISES: CPT

## 2018-07-11 NOTE — PROGRESS NOTES
Daily Note     Today's date: 2018  Patient name: Effie Peterson  : 1980  MRN: [id number]  Referring provider: Alfie Houston DO  Dx:   Encounter Diagnosis     ICD-10-CM    1  Strain of lumbar region, initial encounter S39 012A    2  Lumbar strain, initial encounter S39 012A                   Subjective: Patient noted no pain but tiredness post treatment patient noted fatigue in low back  Objective: See treatment diary below      Assessment: Tolerated treatment well  Added strengthening exercises back into treatment  Patient demonstrated fatigue post treatment and would benefit from continued PT      Plan: Continue per plan of care        Precautions: none     Manuals             HS, piriformis stretch  AN    NP  AN  RK  nv                                                                                   Exercise Diary                        Bike 7 min 7 min NP 8 min  9'  10'            LB stretch pball rollout 10"x10 10" x10 10"x10 10"  x10  10"x10  10           HS stretch 30"x3 30"x3 30"x3 30"x3  30"x3  30"x3           Piriformis stretch 30"x3 30"x3 30"x3 30"x3  30"x3  30"x3           LTR 10"x10 10" x10 10"x10 10"  x10  10"x10  10"x10           SKTC 30"x3 30"x3 NP 30"x3  30"x3  30"x3           PPT 3"x20 3"x20 3"x30 3"x30    3"x30           PPT w/ march NP x10 ea 2 min, 3" hold 3" hold, 2 min    3" hold, 2 min           PPT w/ heel slides NP x10 ea NP NP               PPT with iso hip abd/add NP 5"x15  Ea  NV 3" hold, 2 min    3" hold, 2 min           Glute bridges NP NV 2x10 2x10    nv           Clam shells NP NV NV NV               Standing 3 way SLR     2# 2x10 each NV               Step ups     6" x20 each NV                                                                                                                                                                                                               Modalities                       MHP prn 10 min  10 min 10 min 10 min    10 min

## 2018-07-13 ENCOUNTER — APPOINTMENT (OUTPATIENT)
Dept: PHYSICAL THERAPY | Facility: CLINIC | Age: 38
End: 2018-07-13
Payer: COMMERCIAL

## 2018-08-03 ENCOUNTER — EVALUATION (OUTPATIENT)
Dept: PHYSICAL THERAPY | Facility: CLINIC | Age: 38
End: 2018-08-03
Payer: COMMERCIAL

## 2018-08-03 DIAGNOSIS — S39.012A STRAIN OF LUMBAR REGION, INITIAL ENCOUNTER: Primary | ICD-10-CM

## 2018-08-03 DIAGNOSIS — S39.012A LUMBAR STRAIN, INITIAL ENCOUNTER: ICD-10-CM

## 2018-08-03 PROCEDURE — 97110 THERAPEUTIC EXERCISES: CPT
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
## 2018-08-07 ENCOUNTER — APPOINTMENT (OUTPATIENT)
Dept: PHYSICAL THERAPY | Facility: CLINIC | Age: 38
End: 2018-08-07
Payer: COMMERCIAL

## 2018-08-10 ENCOUNTER — OFFICE VISIT (OUTPATIENT)
Dept: PHYSICAL THERAPY | Facility: CLINIC | Age: 38
End: 2018-08-10
Payer: COMMERCIAL

## 2018-08-10 DIAGNOSIS — S39.012A STRAIN OF LUMBAR REGION, INITIAL ENCOUNTER: Primary | ICD-10-CM

## 2018-08-10 DIAGNOSIS — S39.012A LUMBAR STRAIN, INITIAL ENCOUNTER: ICD-10-CM

## 2018-08-10 PROCEDURE — 97110 THERAPEUTIC EXERCISES: CPT

## 2018-08-10 PROCEDURE — 97140 MANUAL THERAPY 1/> REGIONS: CPT

## 2018-08-10 NOTE — PROGRESS NOTES
Daily Note     Today's date: 8/10/2018  Patient name: Andrew Spencer  : 1980  MRN: [id number]  Referring provider: Andrea Henry DO  Dx:   Encounter Diagnosis     ICD-10-CM    1  Strain of lumbar region, initial encounter S39 012A    2  Lumbar strain, initial encounter S39 012A                   Subjective: Patient reported having some increased LB symptoms, more than usual, after previous treatment, although she also admitted she may have been pushing herself too much  Objective: See treatment diary below  Updated home program       Assessment: Held standing program due to symptoms presented at arrival, focusing on increasing LE flexibility and core activation  Hamstring flexibility significantly improved  Responds well to [address] post treatment  Plan: Continue per plan of care  Progress treatment as tolerated           Precautions: none    Daily Treatment Diary  Manuals  6/1  6/4  6/11  6/18  6/25  7/11  8/3  8/10       HS - D/C, piriformis stretch  AN    NP  AN  RK  nv  AN  EV                                                                               Exercise Diary                        Bike 7 min 7 min NP 8 min 9' 10'  NV  10 min       LB stretch pball rollout 10"x10 10" x10 10"x10 10"  x10 10"x10 10 NV  10"x10       HS stretch 30"x3 30"x3 30"x3 30"x3 30"x3 30"x3 D/C  --       Piriformis stretch 30"x3 30"x3 30"x3 30"x3 30"x3 30"x3 NV  30"x3       LTR 10"x10 10" x10 10"x10 10"  x10 10"x10 10"x10 NV  10"x10       SKTC 30"x3 30"x3 NP 30"x3 30"x3 30"x3 D/C  --       PPT 3"x20 3"x20 3"x30 3"x30   3"x30 3"x30  3"x30       PPT w/ march NP x10 ea 2 min, 3" hold 3" hold, 2 min   3" hold, 2 min 2 min, 3" hold  3" hold, 2 min       PPT w/ heel slides NP x10 ea NP NP               PPT with iso hip abd/add NP 5"x15  Ea  NV 3" hold, 2 min   3" hold, 2 min NP  NP       Glute bridges NP NV 2x10 2x10   nv 2x10  2x10       Clam shells NP NV NV NV     3" x15 each  NP       Standing 3 way SLR     2# 2x10 each NV   Supine SLR 2x10 each  NV       Step ups     6" x20 each NV    NV  NV                                                                                                                                                                                                      Modalities                       MHP prn 10 min  10 min 10 min 10 min    10 min 10 min post  10 min post                                  HEP: LB flex pball stretch, supine piriformis stretch, LTR, PPT, PPT with march, standing hip 3-way

## 2018-08-16 ENCOUNTER — OFFICE VISIT (OUTPATIENT)
Dept: PHYSICAL THERAPY | Facility: CLINIC | Age: 38
End: 2018-08-16
Payer: COMMERCIAL

## 2018-08-16 DIAGNOSIS — S39.012A LUMBAR STRAIN, INITIAL ENCOUNTER: ICD-10-CM

## 2018-08-16 DIAGNOSIS — S39.012A STRAIN OF LUMBAR REGION, INITIAL ENCOUNTER: Primary | ICD-10-CM

## 2018-08-16 PROCEDURE — 97112 NEUROMUSCULAR REEDUCATION: CPT | Performed by: PHYSICAL THERAPY ASSISTANT

## 2018-08-16 PROCEDURE — 97110 THERAPEUTIC EXERCISES: CPT | Performed by: PHYSICAL THERAPY ASSISTANT

## 2018-08-22 ENCOUNTER — OFFICE VISIT (OUTPATIENT)
Dept: PHYSICAL THERAPY | Facility: CLINIC | Age: 38
End: 2018-08-22
Payer: COMMERCIAL

## 2018-08-22 DIAGNOSIS — S39.012A LUMBAR STRAIN, INITIAL ENCOUNTER: ICD-10-CM

## 2018-08-22 DIAGNOSIS — S39.012A STRAIN OF LUMBAR REGION, INITIAL ENCOUNTER: Primary | ICD-10-CM

## 2018-08-22 PROCEDURE — 97140 MANUAL THERAPY 1/> REGIONS: CPT

## 2018-08-22 PROCEDURE — 97110 THERAPEUTIC EXERCISES: CPT

## 2018-08-22 NOTE — PROGRESS NOTES
Daily Note     Today's date: 2018  Patient name: Andrew Spencer  : 1980  MRN: [id number]  Referring provider: Andrea Henry DO  Dx:   Encounter Diagnosis     ICD-10-CM    1  Strain of lumbar region, initial encounter S39 012A    2  Lumbar strain, initial encounter S39 012A                   Subjective: Patient reports she started doing a water aerobics class 4 days ago and noticed increased R side low back pain about 2-3 days ago  She states since then she has had increased pain when lifting LLE, rotating, or sidebending to left  Objective: See treatment diary below  Held most TE today due to increased pain  Assessment: Tolerated treatment poor  Patient unable to complete activities today due to increased R side LBP, likely acute strain of lumbar paraspinals due to change in intensity of activity  Patient would benefit from continued therapy to maximize pain relief, and improve flexibility  Plan: Continue per plan of care  Progress treatment as tolerated           Precautions: none    Daily Treatment Diary  Manuals  6/1  6/4  6/11  6/18  6/25  7/11  8/3  8/10  8/16  8/22   HS - D/C, piriformis stretch  AN    NP  AN  RK  nv  AN  EV  nv  AN                                                                           Exercise Diary                        Bike 7 min 7 min NP 8 min 9' 10'  NV  10 min  10" NP   LB stretch pball rollout 10"x10 10" x10 10"x10 10"  x10 10"x10 10 NV  10"x10  10"x10 10"x10   HS stretch 30"x3 30"x3 30"x3 30"x3 30"x3 30"x3 D/C  --       Piriformis stretch 30"x3 30"x3 30"x3 30"x3 30"x3 30"x3 NV  30"x3  30"x3 30"x3   LTR 10"x10 10" x10 10"x10 10"  x10 10"x10 10"x10 NV  10"x10  10"x10 NV   SKTC 30"x3 30"x3 NP 30"x3 30"x3 30"x3 D/C  --   20" x4   PPT 3"x20 3"x20 3"x30 3"x30   3"x30 3"x30  3"x30  3"x30 3"x30   PPT / march NP x10 ea 2 min, 3" hold 3" hold, 2 min   3" hold, 2 min 2 min, 3" hold  3" hold, 2 min  3"x30 NV   PPT w/ heel slides NP x10 ea NP NP               PPT with iso hip abd/add NP 5"x15  Ea  NV 3" hold, 2 min   3" hold, 2 min NP  NP  3"x30  ea NV   Glute bridges NP NV 2x10 2x10   nv 2x10  2x10  2x10    Clam shells NP NV NV NV     3" x15 each  NP  np NV   Standing 3 way SLR     2# 2x10 each NV   Supine SLR 2x10 each  NV  np NV   Step ups     6" x20 each NV    NV  NV  np NV                                                                                                                                                                                                  Modalities                       MHP prn 10 min  10 min 10 min 10 min    10 min 10 min post  10 min post  np 10 min post

## 2018-08-23 ENCOUNTER — TELEPHONE (OUTPATIENT)
Dept: FAMILY MEDICINE CLINIC | Facility: CLINIC | Age: 38
End: 2018-08-23

## 2018-08-23 NOTE — TELEPHONE ENCOUNTER
Patient's back started hurting in her lower lumbar area on Monday  She has oxycodone 5-325 mg at home  She is wondering if she can take this or should she come in for a injection? Please advise

## 2018-08-24 ENCOUNTER — OFFICE VISIT (OUTPATIENT)
Dept: PHYSICAL THERAPY | Facility: CLINIC | Age: 38
End: 2018-08-24
Payer: COMMERCIAL

## 2018-08-24 DIAGNOSIS — S39.012A STRAIN OF LUMBAR REGION, INITIAL ENCOUNTER: Primary | ICD-10-CM

## 2018-08-24 DIAGNOSIS — S39.012A LUMBAR STRAIN, INITIAL ENCOUNTER: ICD-10-CM

## 2018-08-24 PROCEDURE — 97140 MANUAL THERAPY 1/> REGIONS: CPT

## 2018-08-24 PROCEDURE — 97110 THERAPEUTIC EXERCISES: CPT

## 2018-08-24 NOTE — PROGRESS NOTES
Daily Note     Today's date: 2018  Patient name: Ольга Soto  : 1980  MRN: [id number]  Referring provider: Tremaine Fierro DO  Dx:   Encounter Diagnosis     ICD-10-CM    1  Strain of lumbar region, initial encounter S39 012A    2  Lumbar strain, initial encounter S39 012A                   Subjective: Patient continues to have increased R side LBP, causing pain and difficulty with transferring into and out of car and bed  She states she has been sleeping with pillow under her knees because she feels that when her legs are straight her back spasms  Objective: See treatment diary below  Assessment: Tolerated treatment fair  Patient able to tolerate more stretching/strengthening activities today, however still showing signs of instability and paraspinal muscle strain causing increased localized discomfort  Patient would benefit from continued therapy to maximize pain relief, and improve flexibility  Plan: Continue per plan of care  Progress treatment as tolerated           Precautions: none    Daily Treatment Diary  Manuals              HS - D/C, piriformis stretch  AN                                                                           Exercise Diary                     Bike NP            LB stretch pball rollout NP            Piriformis stretch 30"x3            LTR 10"x10            SKTC 30"x3            PPT 3" x 2'            PPT / march NP            PPT with iso hip abd/add 3" x 2' each            Glute bridges NP            Clam shells NP            Standing 3 way SLR NP            Step ups NP                                                                                                                                                                                                           Modalities                       MHP prn 10' post

## 2018-08-24 NOTE — TELEPHONE ENCOUNTER
She can take that for the pain-  If no better we can call in stronger anti inflammatories for her - she can take advil with it also- that may help-

## 2018-08-27 ENCOUNTER — OFFICE VISIT (OUTPATIENT)
Dept: PAIN MEDICINE | Facility: MEDICAL CENTER | Age: 38
End: 2018-08-27
Payer: COMMERCIAL

## 2018-08-27 ENCOUNTER — APPOINTMENT (OUTPATIENT)
Dept: PHYSICAL THERAPY | Facility: CLINIC | Age: 38
End: 2018-08-27
Payer: COMMERCIAL

## 2018-08-27 VITALS
DIASTOLIC BLOOD PRESSURE: 76 MMHG | RESPIRATION RATE: 12 BRPM | BODY MASS INDEX: 33.38 KG/M2 | WEIGHT: 246.4 LBS | SYSTOLIC BLOOD PRESSURE: 134 MMHG | HEART RATE: 72 BPM | HEIGHT: 72 IN

## 2018-08-27 DIAGNOSIS — R20.0 NUMBNESS AND TINGLING OF LEFT LEG: ICD-10-CM

## 2018-08-27 DIAGNOSIS — M53.3 SACROILIAC JOINT DYSFUNCTION: ICD-10-CM

## 2018-08-27 DIAGNOSIS — M46.1 SACROILIITIS (HCC): Primary | ICD-10-CM

## 2018-08-27 DIAGNOSIS — R20.2 NUMBNESS AND TINGLING OF LEFT LEG: ICD-10-CM

## 2018-08-27 PROCEDURE — 99214 OFFICE O/P EST MOD 30 MIN: CPT | Performed by: NURSE PRACTITIONER

## 2018-08-27 RX ORDER — ACETAMINOPHEN AND CODEINE PHOSPHATE 300; 30 MG/1; MG/1
TABLET ORAL
COMMUNITY
End: 2019-12-30 | Stop reason: ALTCHOICE

## 2018-08-27 RX ORDER — MELOXICAM 15 MG/1
15 TABLET ORAL DAILY
Qty: 30 TABLET | Refills: 1 | Status: SHIPPED | OUTPATIENT
Start: 2018-08-27 | End: 2019-12-30

## 2018-08-27 NOTE — PROGRESS NOTES
Assessment:  1  Sacroiliitis ([address] ) - Right    2  Sacroiliac joint dysfunction    3  Numbness and tingling of left leg        Plan:  1  Based on patient report and physical exam, the patient's symptomatology does seem to be consistent with sacroiliac mediated pain from sacroiliitis  We will schedule the patient for a right SIJ injection to decrease any inflammatory component of the patient's pain symptoms  Complete risks and benefits including bleeding, infection, tissue reaction, nerve injury and allergic reaction were discussed  The approach was demonstrated using models and literature was provided  Verba consent was obtained  2   The patient never initiated meloxicam that was ordered at her last office visit due to the fact that she may have an pregnant  The patient is not pregnant at this time, therefore she will trial this medication for the inflammatory component of her pain complaints  This medication was refilled at today's office visit  She was instructed to take this medication with food and not take any other NSAIDs while taking this medication  The patient was agreeable and verbalized an understanding  3   I did offer the patient an EMG of her left lower extremity due to her complaints of numbness and tingling in the distal aspect of her leg, however the patient declines at this time  4   The patient will continue with her home exercise program  5  The patient can continue with tizanidine for her myofascial pain complaints as prescribed  She does not need a refill of this medication today  5   The patient can continue with Tylenol with codeine as prescribed by her PCP  6  The patient will follow-up in 8 weeks for medication prescription refill and reevaluation  The patient was advised to contact the office should their symptoms worsen in the interim  The patient was agreeable and verbalized an understanding        [place] Prescription Drug Monitoring Program report was reviewed and was appropriate     History of Present Illness: The patient is a 44 y o  female last seen on July 9, 2018 who presents for a follow up office visit in regards to chronic pain secondary to lumbar radiculopathy and sacroiliitis  The patient currently reports that since her last office visit, she has not trialed the meloxicam as she does not like taking medications and she thought she may have been pregnant  Patient does state at today's office visit that she is not currently pregnant  She does have an MRI of her lumbar spine that demonstrates a very small disc bulge at L4-5  She presents today with low back pain, majority on the right and left lower extremity numbness and tingling  She states the pain occasionally radiates into the right hip into her right buttock  She denies any bowel  or bladder incontinence or saddle anesthesia  She does state that she previously got an injection from her PCP into her right hip area, however is unable to determine what it was however this does sound like a right greater trochanter bursa injection  The patient has tried aqua therapy without any significant relief  She currently rates her pain 7/10 on the numeric pain rating scale  She states her pain is intermittent in nature and is most bothersome throughout the entirety of the day  She characterizes the pain as dull aching, sharp, throbbing, pressure-like and numbness  She does state that sitting aggravates the pain and lying down flat alleviates the pain  Current pain medications includes:  Tylenol with codeine as prescribed by her PCP  The patient reports that this regimen is providing moderate pain relief  The patient is reporting no side effects from this pain medication regimen  I have personally reviewed and/or updated the patient's past medical history, past surgical history, family history, social history, current medications, allergies, and vital signs today         Review of Systems:    Review of Systems   Respiratory: Negative for shortness of breath  Cardiovascular: Negative for chest pain  Gastrointestinal: Negative for constipation, diarrhea, nausea and vomiting  Musculoskeletal: Positive for gait problem, joint swelling and myalgias  Negative for arthralgias  Skin: Negative for rash  Neurological: Positive for weakness  Negative for dizziness and seizures  All other systems reviewed and are negative  History reviewed  No pertinent past medical history      Past Surgical History:   Procedure Laterality Date    VAGINAL DELIVERY         Family History   Problem Relation Age of Onset   [facility] Breast cancer Mother     Stroke Father     Diabetes Father     Diabetes type II Father        Social History     Occupational History    Therapist      Social History Main Topics    Smoking status: Never Smoker    Smokeless tobacco: Never Used    Alcohol use Yes      Comment: social; denied per allscripts    Drug use: No    Sexual activity: Not on file         Current Outpatient Prescriptions:     cephalexin (KEFLEX) 250 mg capsule, Take 1 capsule by mouth as needed, Disp: , Rfl:     TRI-LO-SPRINTEC 0 18/0 215/0 25 MG-25 MCG per tablet, , Disp: , Rfl:     acetaminophen-codeine (TYLENOL/CODEINE #3) 300-30 MG per tablet, Take 1 tablet every 4-6 hours by oral route , Disp: , Rfl:     meloxicam (MOBIC) 15 mg tablet, Take 1 tablet (15 mg total) by mouth daily, Disp: 30 tablet, Rfl: 1    Allergies   Allergen Reactions    Pollen Extract        Physical Exam:    /76   Pulse 72   Resp 12   Ht 6' (1 829 m)   Wt 112 kg (246 lb 6 4 oz)   BMI 33 42 kg/m²     Constitutional:Endomorphic body habitus  Eyes:anicteric  HEENT:grossly intact  Neck:supple, symmetric, trachea midline and no masses   Pulmonary:even and unlabored  Cardiovascular:No edema or pitting edema present  Skin:Normal without rashes or lesions and well hydrated  Psychiatric:Mood and affect appropriate  Neurologic:Cranial Nerves II-XII grossly intact  Musculoskeletal:Slightly antalgic gait, but steady without the use of assistive devices   Lumbar facet loading with rotation to the left does mildly reproduce the patient's pain complaint  The patient does appear to have mild right greater trochanter bursitis upon exam     Lumbar Spine Exam    Appearance:  Normal lordosis  Palpation/Tenderness:  right sacroiliac joint tenderness   Left sacroiliac joint nontender to palpation  Range of Motion:  Flexion:  No limitation  without pain  Extension:  Minimally limited  with pain  Rotation - Left:  Minimally limited  with pain  Rotation - Right:  Minimally limited  without pain  Motor Strength:   Strength 5/5 in all muscle groups of the bilateral lower extremities  Special Tests:  Left Straight Leg Test:  negative  Right Straight Leg Test:  negative  Left Matt's Maneuver:  negative  Right Matt's Maneuver:  positive  Left Gaenslen's Test:  negative  Right Gaenslen's Test:  positive  Left Pelvic Distraction Test:  negative  Right Pelvic Distraction Test:  positive      Imaging  FL spine and pain procedure    (Results Pending)     MRI LUMBAR SPINE WITHOUT CONTRAST     INDICATION: M54 16: Radiculopathy, lumbar region radiating to left thigh     COMPARISON:  None      TECHNIQUE:  Sagittal T1, sagittal T2, sagittal inversion recovery, axial T1 and axial T2, coronal T2        IMAGE QUALITY:  Diagnostic     FINDINGS:     ALIGNMENT:  Very minor straightening of normal lumbar lordosis  Minor endplate Schmorl's node the L4, in evolution  No anterolisthesis     MARROW SIGNAL:  Very minor marrow edema is present in the marrow adjacent to a of a more chronic undersurface L4 Schmorl's node      DISTAL CORD AND CONUS:  Normal size and signal within the distal cord and conus  The conus ends at the L2 level      PARASPINAL SOFT TISSUES:  Paraspinal soft tissues are unremarkable      SACRUM:  Normal signal within the sacrum   No evidence of insufficiency or stress fracture      LOWER THORACIC DISC SPACES:  Normal disc height and signal   No disc herniation, canal stenosis or foraminal narrowing      LUMBAR DISC SPACES:     L1-L2:  Normal      L2-L3:  Normal      L3-L4:  Normal      L4-L5:  Thin broad-based left posterolateral protrusion without significant mass effect, there is minor deformity of the thecal sac but no compression of the exiting L4 or descending L5 roots      L5-S1:  Minor bulging of the disc without evidence for nerve root compression      IMPRESSION:     Disc disease L4-L5 and L5-S1  Although L4-5 disease lateralizes to the left, no clear L4 or L5 root compression        Orders Placed This Encounter   Procedures    FL spine and pain procedure

## 2018-08-29 ENCOUNTER — OFFICE VISIT (OUTPATIENT)
Dept: PHYSICAL THERAPY | Facility: CLINIC | Age: 38
End: 2018-08-29
Payer: COMMERCIAL

## 2018-08-29 DIAGNOSIS — S39.012A STRAIN OF LUMBAR REGION, INITIAL ENCOUNTER: Primary | ICD-10-CM

## 2018-08-29 DIAGNOSIS — S39.012A LUMBAR STRAIN, INITIAL ENCOUNTER: ICD-10-CM

## 2018-08-29 PROCEDURE — 97140 MANUAL THERAPY 1/> REGIONS: CPT

## 2018-08-29 PROCEDURE — 97110 THERAPEUTIC EXERCISES: CPT

## 2018-09-05 ENCOUNTER — OFFICE VISIT (OUTPATIENT)
Dept: PHYSICAL THERAPY | Facility: CLINIC | Age: 38
End: 2018-09-05
Payer: COMMERCIAL

## 2018-09-05 DIAGNOSIS — S39.012A LUMBAR STRAIN, INITIAL ENCOUNTER: ICD-10-CM

## 2018-09-05 DIAGNOSIS — S39.012A STRAIN OF LUMBAR REGION, INITIAL ENCOUNTER: Primary | ICD-10-CM

## 2018-09-05 PROCEDURE — 97140 MANUAL THERAPY 1/> REGIONS: CPT

## 2018-09-05 PROCEDURE — 97110 THERAPEUTIC EXERCISES: CPT

## 2018-09-05 NOTE — PROGRESS NOTES
Daily Note     Today's date: 2018  Patient name: Mandi Muniz  : 1980  MRN: [id number]  Referring provider: Nuria Amador DO  Dx:   Encounter Diagnosis     ICD-10-CM    1  Strain of lumbar region, initial encounter S39 012A    2  Lumbar strain, initial encounter S39 012A                   Subjective: Patient denied LB pain since previous treatment last week  No use of medication  Stated she is planning to cancel injection she has scheduled for 9/10  Objective: See treatment diary below  Progressed LE strengthening  Assessment: Improved tolerance to exercise program as compared to previous treatment  Continued instance of numbness in R foot with manual piriformis stretch  Stressed importance of easing into gym routine to protect LB  Plan: Continue per plan of care  Progress treatment as tolerated           Precautions: none    Daily Treatment Diary  Manuals            HS - D/C, piriformis stretch  AN EV EV                                                                         Exercise Diary                     Bike NP NP NP          LB stretch pball rollout NP 10"x4 - pain 10"x  10          Piriformis stretch 30"x3 30"x3 30"x3          LTR 10"x10 10"x10 10"x10          SKTC 30"x3 30"x3 30"x3          PPT 3" x 2' 3" hold, 2 min 3" hold, 2 min          PPT / march NP NP NP          PPT with iso hip abd/add 3" x 2' each Blue 3" hold, 2 min ea Blue   3" hold, 2 min   ea          Glute bridges NP NV NP          Clam shells NP NV NP          Standing 3 way SLR NP NP 1#   2x10   ea          Step ups NP NP NP                                                                                                                                                                                                         Modalities                       MHP prn 10' post 15 min post 15 min post

## 2018-09-12 ENCOUNTER — OFFICE VISIT (OUTPATIENT)
Dept: PHYSICAL THERAPY | Facility: CLINIC | Age: 38
End: 2018-09-12
Payer: COMMERCIAL

## 2018-09-12 DIAGNOSIS — S39.012A LUMBAR STRAIN, INITIAL ENCOUNTER: ICD-10-CM

## 2018-09-12 DIAGNOSIS — S39.012A STRAIN OF LUMBAR REGION, INITIAL ENCOUNTER: Primary | ICD-10-CM

## 2018-09-12 PROCEDURE — 97110 THERAPEUTIC EXERCISES: CPT

## 2018-09-12 PROCEDURE — 97140 MANUAL THERAPY 1/> REGIONS: CPT

## 2018-09-12 NOTE — PROGRESS NOTES
Daily Note     Today's date: 2018  Patient name: Joaquin Horn  : 1980  MRN: [id number]  Referring provider: Stacey Law DO  Dx:   Encounter Diagnosis     ICD-10-CM    1  Strain of lumbar region, initial encounter S39 012A    2  Lumbar strain, initial encounter S39 012A                   Subjective: Patient reported improvement since recent bout of symptoms roughly two weeks ago, cancelling injection that was scheduled for this week  Instance of brief, sharp pain on car ride to treatment  Has increased activity at the gym with good tolerance thus far  Objective: See treatment diary below      Assessment: Improved tolerance to program  Continued instance of numbness in foot during manual piriformis stretch  Responds well to [address] post   Asked advice on brace for LB, educating on avoiding use or purchase as patient would not be a good candidate as symptoms have improved  Plan: Continue per plan of care  Progress treatment as tolerated           Precautions: none    Daily Treatment Diary  Manuals           HS - D/C, piriformis stretch  AN EV EV EV                                                                        Exercise Diary                     Bike NP NP NP NP         LB stretch pball rollout NP 10"x4 - pain 10"x  10 10"x10         Piriformis stretch 30"x3 30"x3 30"x3 30"x3         LTR 10"x10 10"x10 10"x10 10"x10         SKTC 30"x3 30"x3 30"x3 30"x3         PPT 3" x 2' 3" hold, 2 min 3" hold, 2 min 3" hold,   2 min         PPT / march NP NP NP NP         PPT with iso hip abd/add 3" x 2' each Blue 3" hold, 2 min ea Blue   3" hold, 2 min   ea Blue 3" hold,   2 min ea         Glute bridges NP NV NP NP         Clam shells NP NV NP NP         Standing 3 way SLR NP NP 1#   2x10   ea NP         Step ups NP NP NP NP                                                                                                                                                                                                        Modalities                       MHP prn 10' post 15 min post 15 min post 15 min post                                   15 min of exercise not supervised

## 2018-09-19 ENCOUNTER — OFFICE VISIT (OUTPATIENT)
Dept: PHYSICAL THERAPY | Facility: CLINIC | Age: 38
End: 2018-09-19
Payer: COMMERCIAL

## 2018-09-19 DIAGNOSIS — S39.012A LUMBAR STRAIN, INITIAL ENCOUNTER: ICD-10-CM

## 2018-09-19 DIAGNOSIS — S39.012A STRAIN OF LUMBAR REGION, INITIAL ENCOUNTER: Primary | ICD-10-CM

## 2018-09-19 PROCEDURE — 97140 MANUAL THERAPY 1/> REGIONS: CPT

## 2018-09-19 PROCEDURE — 97110 THERAPEUTIC EXERCISES: CPT

## 2018-09-19 NOTE — PROGRESS NOTES
Daily Note     Today's date: 2018  Patient name: Christy Boudreaux  : 1980  MRN: [id number]  Referring provider: Chelsea Colin DO  Dx:   Encounter Diagnosis     ICD-10-CM    1  Strain of lumbar region, initial encounter S39 012A    2  Lumbar strain, initial encounter S39 012A                   Subjective: Patient stated she continues to progress and participate in gym routine with no setbacks  Remains cautious of bending  Objective: See treatment diary below      Assessment: Overall improved tolerance to exercises  Numbness in foot remains unchanged during manual piriformis stretch  Plan: Continue per plan of care  Progress treatment as tolerated  Will be traveling for work next week, returning for PT on 10/5        Precautions: none    Daily Treatment Diary  Manuals          HS - D/C, piriformis stretch  AN EV EV EV EV                                                                       Exercise Diary                     Bike NP NP NP NP NP        LB stretch pball rollout NP 10"x4 - pain 10"x  10 10"x10 10"x10        Piriformis stretch 30"x3 30"x3 30"x3 30"x3 30"x3        LTR 10"x10 10"x10 10"x10 10"x10 10"x10        SKTC 30"x3 30"x3 30"x3 30"x3 30"x3        PPT 3" x 2' 3" hold, 2 min 3" hold, 2 min 3" hold,   2 min 3" hold, 2 min        PPT / march NP NP NP NP NP        PPT with iso hip abd/add 3" x 2' each Blue 3" hold, 2 min ea Blue   3" hold, 2 min   ea Blue 3" hold,   2 min ea Blue   3" hold,   2 min ea        Glute bridges NP NV NP NP NP        Clam shells NP NV NP NP NP        Standing 3 way SLR NP NP 1#   2x10   ea NP NP        Step ups NP NP NP NP NP                                                                                                                                                                                                       Modalities                       MHP prn 10' post 15 min post 15 min post 15 min post 10 min post

## 2018-09-26 ENCOUNTER — APPOINTMENT (OUTPATIENT)
Dept: PHYSICAL THERAPY | Facility: CLINIC | Age: 38
End: 2018-09-26
Payer: COMMERCIAL

## 2018-10-05 ENCOUNTER — APPOINTMENT (OUTPATIENT)
Dept: PHYSICAL THERAPY | Facility: CLINIC | Age: 38
End: 2018-10-05
Payer: COMMERCIAL

## 2018-10-19 ENCOUNTER — OFFICE VISIT (OUTPATIENT)
Dept: PHYSICAL THERAPY | Facility: CLINIC | Age: 38
End: 2018-10-19
Payer: COMMERCIAL

## 2018-10-19 DIAGNOSIS — S39.012A STRAIN OF LUMBAR REGION, INITIAL ENCOUNTER: Primary | ICD-10-CM

## 2018-10-19 PROCEDURE — G8982 BODY POS GOAL STATUS: HCPCS

## 2018-10-19 PROCEDURE — G8981 BODY POS CURRENT STATUS: HCPCS

## 2018-10-19 PROCEDURE — 97110 THERAPEUTIC EXERCISES: CPT

## 2018-10-19 PROCEDURE — 97140 MANUAL THERAPY 1/> REGIONS: CPT
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
## 2018-11-07 ENCOUNTER — OFFICE VISIT (OUTPATIENT)
Dept: PHYSICAL THERAPY | Facility: CLINIC | Age: 38
End: 2018-11-07
Payer: COMMERCIAL

## 2018-11-07 DIAGNOSIS — S39.012A STRAIN OF LUMBAR REGION, INITIAL ENCOUNTER: Primary | ICD-10-CM

## 2018-11-07 PROCEDURE — 97112 NEUROMUSCULAR REEDUCATION: CPT

## 2018-11-07 PROCEDURE — 97110 THERAPEUTIC EXERCISES: CPT

## 2018-11-07 PROCEDURE — 97140 MANUAL THERAPY 1/> REGIONS: CPT

## 2018-11-07 NOTE — PROGRESS NOTES
Daily Note     Today's date: 2018  Patient name: Liang Cabrales  : 1980  MRN: [id number]  Referring provider: Rosalind Obrien DO  Dx:   Encounter Diagnosis     ICD-10-CM    1  Strain of lumbar region, initial encounter S39 012A                   Subjective: Patient returns 3 weeks after re-evaluation for low back pain  She reports she is doing well, and her only report is that she occasionally gets a "sting" of discomfort in R low back (QL region) when performing some daily activities like lifting a laundry basket  Objective: See treatment diary below  Assessment: Tolerated treatment well  Good performance of functional core strengthening activities, with cueing given for proper form and technique  Once patient performs with proper technique, she experiences appropriate muscle fatigue  Patient demonstrated fatigue post treatment, exhibited good technique with therapeutic exercises and would benefit from continued PT      Plan: Continue per plan of care         Precautions: none    Daily Treatment Diary    Manuals  8/24 8/29 9/5 9/12 9/19  10/19 11/7      HS - D/C, piriformis stretch  AN EV EV EV EV AN NP                                                                     Exercise Diary                     Bike NP NP NP NP NP 10' 10'      LB stretch pball rollout NP 10"x4 - pain 10"x  10 10"x10 10"x10 NP 10"  x10      Piriformis stretch 30"x3 30"x3 30"x3 30"x3 30"x3 30"x3 30"x3      LTR 10"x10 10"x10 10"x10 10"x10 10"x10 10"x10 NP      SKTC 30"x3 30"x3 30"x3 30"x3 30"x3 30"x3 NP      PPT 3" x 2' 3" hold, 2 min 3" hold, 2 min 3" hold,   2 min 3" hold, 2 min NV 3" hold, 2 min      PPT / march NP NP NP NP NP NP NP      PPT with iso hip abd/add 3" x 2' each Blue 3" hold, 2 min ea Blue   3" hold, 2 min   ea Blue 3" hold,   2 min ea Blue   3" hold,   2 min ea NV Blue, 3" hold, 2 min ea      Glute bridges NP NV NP NP NP 2x10 2x15      Clam shells NP NV NP NP NP NP NP      Standing 3 way SLR NP NP 1# 2x10   ea NP NP NP NP      Step ups NP NP NP NP NP NP NV      Split squats           NV 2x10 each         TA w/ TB rows             GTB 3x10         TA w/ TB ext             GTB 3x10                                                                                                                                 Modalities                       MHP prn 10' post 15 min post 15 min post 15 min post 10 min post NP 10 min post

## 2018-11-14 ENCOUNTER — OFFICE VISIT (OUTPATIENT)
Dept: PHYSICAL THERAPY | Facility: CLINIC | Age: 38
End: 2018-11-14
Payer: COMMERCIAL

## 2018-11-14 DIAGNOSIS — S39.012A STRAIN OF LUMBAR REGION, INITIAL ENCOUNTER: Primary | ICD-10-CM

## 2018-11-14 PROCEDURE — 97110 THERAPEUTIC EXERCISES: CPT

## 2018-11-14 PROCEDURE — 97112 NEUROMUSCULAR REEDUCATION: CPT

## 2018-11-14 NOTE — PROGRESS NOTES
Daily Note     Today's date: 2018  Patient name: Justin Perdue  : 1980  MRN: [id number]  Referring provider: Cali Cabrera DO  Dx:   Encounter Diagnosis     ICD-10-CM    1  Strain of lumbar region, initial encounter S39 012A        Start Time: 820  Stop Time: 915  Total time in clinic (min): 55 minutes    Subjective: Patient reports that turning in bed is now easier/less painful then it was  Patient continues to have overall R low back soreness  Patient states she starts to feel increased soreness during bridges and is careful  Objective: See treatment diary below  Assessment: Tolerated treatment well  Patient demonstrated fatigue post treatment, exhibited good technique with therapeutic exercises and would benefit from continued PT  Patient lacks full hip ext during bridges secondary to anticipation of increased discomfort  Plan: Continue per plan of care       Precautions: none    Daily Treatment Diary    Manuals  8/24 8/29 9/5 9/12 9/19  10/19 11/7 11/14     HS - D/C, piriformis stretch  AN EV EV EV EV AN NP np                                                                    Exercise Diary                     Bike NP NP NP NP NP 10' 10' 10'     LB stretch pball rollout NP 10"x4 - pain 10"x  10 10"x10 10"x10 NP 10"  x10 nv     Piriformis stretch 30"x3 30"x3 30"x3 30"x3 30"x3 30"x3 30"x3 30"x3     LTR 10"x10 10"x10 10"x10 10"x10 10"x10 10"x10 NP np     SKTC 30"x3 30"x3 30"x3 30"x3 30"x3 30"x3 NP np     PPT 3" x 2' 3" hold, 2 min 3" hold, 2 min 3" hold,   2 min 3" hold, 2 min NV 3" hold, 2 min 3" hold  2 min     PPT / march NP NP NP NP NP NP NP      PPT with iso hip abd/add 3" x 2' each Blue 3" hold, 2 min ea Blue   3" hold, 2 min   ea Blue 3" hold,   2 min ea Blue   3" hold,   2 min ea NV Blue, 3" hold, 2 min ea Blue  3" hold 2 min ea     Glute bridges NP NV NP NP NP 2x10 2x15 2x15     Clam shells NP NV NP NP NP NP NP np     Standing 3 way SLR NP NP 1#   2x10   ea NP NP NP NP np Step ups NP NP NP NP NP NP NV  6" x10 ea     Split squats           NV 2x10 each  2x10 ea       TA w/ TB rows             GTB 3x10  GTB   3x10       TA w/ TB ext             GTB 3x10  GTB  3x10                                                                                                                               Modalities                       MHP prn 10' post 15 min post 15 min post 15 min post 10 min post NP 10 min post 10 min post

## 2018-11-21 ENCOUNTER — APPOINTMENT (OUTPATIENT)
Dept: PHYSICAL THERAPY | Facility: CLINIC | Age: 38
End: 2018-11-21
Payer: COMMERCIAL

## 2018-11-28 ENCOUNTER — OFFICE VISIT (OUTPATIENT)
Dept: PHYSICAL THERAPY | Facility: CLINIC | Age: 38
End: 2018-11-28
Payer: COMMERCIAL

## 2018-11-28 DIAGNOSIS — S39.012A STRAIN OF LUMBAR REGION, INITIAL ENCOUNTER: Primary | ICD-10-CM

## 2018-11-28 PROCEDURE — 97112 NEUROMUSCULAR REEDUCATION: CPT

## 2018-11-28 PROCEDURE — 97110 THERAPEUTIC EXERCISES: CPT

## 2018-11-28 NOTE — PROGRESS NOTES
Daily Note     Today's date: 2018  Patient name: Bert Calvin  : 1980  MRN: [id number]  Referring provider: Tiffanie Savage DO  Dx:   Encounter Diagnosis     ICD-10-CM    1  Strain of lumbar region, initial encounter S39 012A        Start Time: 08  Stop Time: 09  Total time in clinic (min): 55 minutes    Subjective: Patient reports that she is 70-75% right now  Patient states she has been avoid rotation of her trunk because she is worried it will irritate her back  Objective: See treatment diary below  Trialed lifting mechanics  Assessment: Tolerated treatment well  Patient demonstrated fatigue post treatment, exhibited good technique with therapeutic exercises and would benefit from continued PT  Patient was able to lift an empty laundry basket properly but did demonstrate LE weakness with a squat  Plan: Continue per plan of care       Precautions: none    Daily Treatment Diary    Manuals  8/24 8/29 9/5 9/12 9/19  10/19 11/7 11/14 11/28    HS - D/C, piriformis stretch  AN EV EV EV EV AN NP np np                                                                   Exercise Diary                     Bike NP NP NP NP NP 10' 10' 10' 10'    LB stretch pball rollout NP 10"x4 - pain 10"x  10 10"x10 10"x10 NP 10"  x10 nv 10"x10    Piriformis stretch 30"x3 30"x3 30"x3 30"x3 30"x3 30"x3 30"x3 30"x3 30"x3    LTR 10"x10 10"x10 10"x10 10"x10 10"x10 10"x10 NP np np    SKTC 30"x3 30"x3 30"x3 30"x3 30"x3 30"x3 NP np np    PPT 3" x 2' 3" hold, 2 min 3" hold, 2 min 3" hold,   2 min 3" hold, 2 min NV 3" hold, 2 min 3" hold  2 min 5" hold  2 min    PPT w/ march NP NP NP NP NP NP NP      PPT with iso hip abd/add 3" x 2' each Blue 3" hold, 2 min ea Blue   3" hold, 2 min   ea Blue 3" hold,   2 min ea Blue   3" hold,   2 min ea NV Blue, 3" hold, 2 min ea Blue  3" hold 2 min ea Blue  5" hold  2 min ea    Glute bridges NP NV NP NP NP 2x10 2x15 2x15 2x15    Clam shells NP NV NP NP NP NP NP np np    Standing 3 way SLR NP NP 1#   2x10   ea NP NP NP NP np np    Step ups NP NP NP NP NP NP NV  6" x10 ea 6" x10 ea    Split squats           NV 2x10 each  2x10 ea  2x10 ea     TA w/ TB rows             GTB 3x10  GTB   3x10  Blue  3x10     TA w/ TB ext             GTB 3x10  GTB  3x10  Blue 3x10      Pallof                  PTB x5 ea      Mini squats                  nv                                                                             Modalities                       MHP prn 10' post 15 min post 15 min post 15 min post 10 min post NP 10 min post 10 min post 10 min post

## 2018-12-05 ENCOUNTER — OFFICE VISIT (OUTPATIENT)
Dept: PHYSICAL THERAPY | Facility: CLINIC | Age: 38
End: 2018-12-05
Payer: COMMERCIAL

## 2018-12-05 DIAGNOSIS — S39.012A STRAIN OF LUMBAR REGION, INITIAL ENCOUNTER: Primary | ICD-10-CM

## 2018-12-05 PROCEDURE — 97110 THERAPEUTIC EXERCISES: CPT

## 2018-12-05 NOTE — PROGRESS NOTES
Daily Note     Today's date: 2018  Patient name: Liang Cabrales  : 1980  MRN: [id number]  Referring provider: Rosalind Obrien DO  Dx:   Encounter Diagnosis     ICD-10-CM    1  Strain of lumbar region, initial encounter S39 012A                   Subjective: Patient reported continued improvement in LB pain  Stated "if I'd do my stretches during the week, I'd probably feel better"  Objective: See treatment diary below  Updated home program   FOTO score improved from 45 to 59 over 21 visits  Assessment: Patient was limited on time, unable to complete majority of program as she requested MH at end of treatment  Would benefit from resuming / completing functional strengthening NV  Plan: Continue per plan of care         Precautions: none    Daily Treatment Diary  Manuals  8/24 8/29 9/5 9/12 9/19  10/19 11/7 11/14 11/28 12/5   HS - D/C, piriformis stretch  AN EV EV EV EV AN NP np np NP                                                                  Exercise Diary                     Bike NP NP NP NP NP 10' 10' 10' 10' 10 min   LB stretch pball rollout NP 10"x4 - pain 10"x  10 10"x10 10"x10 NP 10"  x10 nv 10"x10 10"x5 ea   Piriformis stretch 30"x3 30"x3 30"x3 30"x3 30"x3 30"x3 30"x3 30"x3 30"x3 30"x3   LTR 10"x10 10"x10 10"x10 10"x10 10"x10 10"x10 NP np np NP   SKTC 30"x3 30"x3 30"x3 30"x3 30"x3 30"x3 NP np np NP   PPT 3" x 2' 3" hold, 2 min 3" hold, 2 min 3" hold,   2 min 3" hold, 2 min NV 3" hold, 2 min 3" hold  2 min 5" hold  2 min 5" hold, 2 min   PPT w/ march NP NP NP NP NP NP NP      PPT with iso hip abd/add 3" x 2' each Blue 3" hold, 2 min ea Blue   3" hold, 2 min   ea Blue 3" hold,   2 min ea Blue   3" hold,   2 min ea NV Blue, 3" hold, 2 min ea Blue  3" hold 2 min ea Blue  5" hold  2 min ea Blue  5" hold, 2 min   Glute bridges NP NV NP NP NP 2x10 2x15 2x15 2x15 NV   Clam shells NP NV NP NP NP NP NP np np NP   Standing 3 way SLR NP NP 1#   2x10   ea NP NP NP NP np np NP   Step ups NP NP NP NP NP NP NV  6" x10 ea 6" x10 ea NV   Split squats           NV 2x10 each  2x10 ea  2x10 ea NV   TA w/ TB rows             GTB 3x10  GTB   3x10  Blue  3x10 NV   TA w/ TB ext             GTB 3x10  GTB  3x10  Blue 3x10 NV   Pallof                  PTB x5 ea NV    Mini squats                  nv NV                                                                            Modalities                       MHP prn 10' post 15 min post 15 min post 15 min post 10 min post NP 10 min post 10 min post 10 min post 10 min post                             HEP: seated lumbar flex stretch, supine piriformis stretch, PPT, iso hip abd ( blue ) / add, bridges, TB ext and rows ( green )

## 2018-12-12 ENCOUNTER — OFFICE VISIT (OUTPATIENT)
Dept: PHYSICAL THERAPY | Facility: CLINIC | Age: 38
End: 2018-12-12
Payer: COMMERCIAL

## 2018-12-12 DIAGNOSIS — S39.012A STRAIN OF LUMBAR REGION, INITIAL ENCOUNTER: Primary | ICD-10-CM

## 2018-12-12 PROCEDURE — 97112 NEUROMUSCULAR REEDUCATION: CPT

## 2018-12-12 PROCEDURE — 97110 THERAPEUTIC EXERCISES: CPT

## 2018-12-12 NOTE — PROGRESS NOTES
Daily Note     Today's date: 2018  Patient name: Jose Ziegler  : 1980  MRN: [id number]  Referring provider: Dania Gary DO  Dx:   Encounter Diagnosis     ICD-10-CM    1  Strain of lumbar region, initial encounter S39 012A                   Subjective: Patient reported she continues to have difficulty with forward bending, like when brushing her teeth and lifting laundry basket  Objective: See treatment diary below      Assessment: Remained limited on time due to patient constraints, focusing on flexibility and core stability  Provided cueing to ensure proper core activation was maintained during program       Plan: Continue per plan of care  Precautions: none    Daily Treatment Diary  Manuals             HS - D/C, piriformis stretch NP                                                                           Exercise Diary                     Bike 10 min            LB stretch pball rollout 10"x5 ea            Piriformis stretch 30"x3            LTR NP            SKTC NP            PPT 5" hold, 2 min            PPT / march             PPT with iso hip abd/add Blue  5" hold, 2 min            Glute bridges 2x10            Clam shells NP            Standing 3 way SLR NP            Step ups NV            Split squats NV            TA w/ TB rows Green  3" hold, 2x10            TA w/ TB ext Green  3" hold, 2x10            Pallof NV            Mini squats NV                                                         Modalities              MHP prn 10 min post                                      HEP: seated lumbar flex stretch, supine piriformis stretch, PPT, iso hip abd ( blue ) / add, bridges, TB ext and rows ( green )    10 min of exercise not supervised

## 2018-12-19 ENCOUNTER — APPOINTMENT (OUTPATIENT)
Dept: PHYSICAL THERAPY | Facility: CLINIC | Age: 38
End: 2018-12-19
Payer: COMMERCIAL

## 2019-12-30 ENCOUNTER — OFFICE VISIT (OUTPATIENT)
Dept: FAMILY MEDICINE CLINIC | Facility: CLINIC | Age: 39
End: 2019-12-30

## 2019-12-30 VITALS
SYSTOLIC BLOOD PRESSURE: 110 MMHG | OXYGEN SATURATION: 98 % | RESPIRATION RATE: 16 BRPM | BODY MASS INDEX: 32.41 KG/M2 | DIASTOLIC BLOOD PRESSURE: 60 MMHG | WEIGHT: 239 LBS | HEART RATE: 86 BPM | TEMPERATURE: 96.5 F

## 2019-12-30 DIAGNOSIS — S39.012A STRAIN OF LUMBAR REGION, INITIAL ENCOUNTER: Primary | ICD-10-CM

## 2019-12-30 PROBLEM — M54.16 LUMBAR RADICULAR PAIN: Status: RESOLVED | Noted: 2018-04-18 | Resolved: 2019-12-30

## 2019-12-30 PROBLEM — M46.1 SACROILIITIS (HCC): Status: RESOLVED | Noted: 2018-08-27 | Resolved: 2019-12-30

## 2019-12-30 PROCEDURE — 1036F TOBACCO NON-USER: CPT | Performed by: FAMILY MEDICINE

## 2019-12-30 PROCEDURE — 96372 THER/PROPH/DIAG INJ SC/IM: CPT | Performed by: FAMILY MEDICINE

## 2019-12-30 PROCEDURE — 99214 OFFICE O/P EST MOD 30 MIN: CPT | Performed by: FAMILY MEDICINE

## 2019-12-30 RX ORDER — KETOROLAC TROMETHAMINE 30 MG/ML
30 INJECTION, SOLUTION INTRAMUSCULAR; INTRAVENOUS ONCE
Status: CANCELLED | OUTPATIENT
Start: 2019-12-30 | End: 2020-01-04

## 2019-12-30 RX ORDER — KETOROLAC TROMETHAMINE 30 MG/ML
30 INJECTION, SOLUTION INTRAMUSCULAR; INTRAVENOUS ONCE
Status: COMPLETED | OUTPATIENT
Start: 2019-12-30 | End: 2019-12-30

## 2019-12-30 RX ORDER — CEPHALEXIN 250 MG/1
CAPSULE ORAL
COMMUNITY
Start: 2014-01-20 | End: 2022-06-16 | Stop reason: ALTCHOICE

## 2019-12-30 RX ORDER — NAPROXEN 500 MG/1
500 TABLET ORAL 2 TIMES DAILY WITH MEALS
Qty: 20 TABLET | Refills: 0 | Status: SHIPPED | OUTPATIENT
Start: 2019-12-30 | End: 2019-12-30

## 2019-12-30 RX ORDER — NAPROXEN 500 MG/1
500 TABLET ORAL 2 TIMES DAILY WITH MEALS
Qty: 20 TABLET | Refills: 0 | Status: SHIPPED | OUTPATIENT
Start: 2019-12-30 | End: 2022-06-16 | Stop reason: ALTCHOICE

## 2019-12-30 RX ADMIN — KETOROLAC TROMETHAMINE 30 MG: 30 INJECTION, SOLUTION INTRAMUSCULAR; INTRAVENOUS at 17:01

## 2019-12-30 NOTE — PATIENT INSTRUCTIONS
Lower Back Exercises   AMBULATORY CARE:   Lower back exercises  help heal and strengthen your back muscles to prevent another injury  Ask your healthcare provider if you need to see a physical therapist for more advanced exercises  Seek care immediately if:   · You have severe pain that prevents you from moving  Contact your healthcare provider if:   · Your pain becomes worse  · You have new pain  · You have questions or concerns about your condition or care  Do lower back exercises safely:   · Do the exercises on a mat or firm surface  (not on a bed) to support your spine and prevent low back pain  · Move slowly and smoothly  Avoid fast or jerky motions  · Breathe normally  Do not hold your breath  · Stop if you feel pain  It is normal to feel some discomfort at first  Regular exercise will help decrease your discomfort over time  Lower back exercises: Your healthcare provider may recommend that you do back exercises 10 to 30 minutes each day  He may also recommend that you do exercises 1 to 3 times each day  Ask your healthcare provider which exercises are best for you and how often to do them  · Ankle pumps:  Lie on your back  Move your foot up (with your toes pointing toward your head)  Then, move your foot down (with your toes pointing away from you)  Repeat this exercise 10 times on each side  · Heel slides:  Lie on your back  Slowly bend one leg and then straighten it  Next, bend the other leg and then straighten it  Repeat 10 times on each side  · Pelvic tilt:  Lie on your back with your knees bent and feet flat on the floor  Place your arms in a relaxed position beside your body  Tighten the muscles of your abdomen and flatten your back against the floor  Hold for 5 seconds  Repeat 5 times  · Back stretch:  Lie on your back with your hands behind your head  Bend your knees and turn the lower half of your body to one side   Hold this position for 10 seconds  Repeat 3 times on each side  · Straight leg raises:  Lie on your back with one leg straight  Bend the other knee  Tighten your abdomen and then slowly lift the straight leg up about 6 to 12 inches off the floor  Hold for 1 to 5 seconds  Lower your leg slowly  Repeat 10 times on each leg  · Knee-to-chest:  Lie on your back with your knees bent and feet flat on the floor  Pull one of your knees toward your chest and hold it there for 5 seconds  Return your leg to the starting position  Lift the other knee toward your chest and hold for 5 seconds  Do this 5 times on each side  · Cat and camel:  Place your hands and knees on the floor  Arch your back upward toward the ceiling and lower your head  Round out your spine as much as you can  Hold for 5 seconds  Lift your head upward and push your chest downward toward the floor  Hold for 5 seconds  Do 3 sets or as directed  · Wall squats:  Stand with your back against a wall  Tighten the muscles of your abdomen  Slowly lower your body until your knees are bent at a 45 degree angle  Hold this position for 5 seconds  Slowly move back up to a standing position  Repeat 10 times  · Curl up:  Lie on your back with your knees bent and feet flat on the floor  Place your hands, palms down, underneath the curve in your lower back  Next, with your elbows on the floor, lift your shoulders and chest 2 to 3 inches  Keep your head in line with your shoulders  Hold this position for 5 seconds  When you can do this exercise without pain for 10 to 15 seconds, you may add a rotation  While your shoulders and chest are lifted off the ground, turn slightly to the left and hold  Repeat on the other side  · Bird dog:  Place your hands and knees on the floor  Keep your wrists directly below your shoulders and your knees directly below your hips  Pull your belly button in toward your spine  Do not flatten or arch your back   Tighten your abdominal muscles  Raise one arm straight out so that it is aligned with your head  Next, raise the leg opposite your arm  Hold this position for 15 seconds  Lower your arm and leg slowly and change sides  Do 5 sets  © 2017 [employer] Information is for End User's use only and may not be sold, redistributed or otherwise used for commercial purposes  All illustrations and images included in CareNotes® are the copyrighted property of [employer]  or [employer]  The above information is an  only  It is not intended as medical advice for individual conditions or treatments  Talk to your doctor, nurse or pharmacist before following any medical regimen to see if it is safe and effective for you

## 2020-01-02 ENCOUNTER — TELEPHONE (OUTPATIENT)
Dept: FAMILY MEDICINE CLINIC | Facility: CLINIC | Age: 40
End: 2020-01-02

## 2020-01-02 NOTE — TELEPHONE ENCOUNTER
Spoke with patient and gave her the message  She will do her exercises at home and continue to take Naproxen

## 2020-01-02 NOTE — TELEPHONE ENCOUNTER
Patient says her lower back is feeling better since seeing you  The injection and Naproxen have helped her lower back pain  She says she has pain in her sciatic nerve  What can she do for this?

## 2020-01-02 NOTE — TELEPHONE ENCOUNTER
She can still take the Naproxen for that, but she may have to start seeing PT if the nerve pain is still there  Or at least re-start her home sciatica exercises

## 2020-01-03 ENCOUNTER — TELEPHONE (OUTPATIENT)
Dept: FAMILY MEDICINE CLINIC | Facility: CLINIC | Age: 40
End: 2020-01-03

## 2020-01-03 DIAGNOSIS — M54.30 SCIATICA, UNSPECIFIED LATERALITY: Primary | ICD-10-CM

## 2020-01-03 RX ORDER — METHYLPREDNISOLONE 4 MG/1
TABLET ORAL
Qty: 21 TABLET | Refills: 0 | Status: SHIPPED | OUTPATIENT
Start: 2020-01-03 | End: 2020-01-09

## 2020-01-03 NOTE — TELEPHONE ENCOUNTER
She should see PT, I can also send in some steroids to help with inflammation around the nerve as well if she would like

## 2020-01-03 NOTE — TELEPHONE ENCOUNTER
Order placed for referral and steroids  She can start the steroids this evening, she should take it with food

## 2020-01-03 NOTE — TELEPHONE ENCOUNTER
Patient called  She's been using Naproxen and doing her exercises and she is getting worse  Her Right thigh is now numb  Please advise

## 2020-01-03 NOTE — TELEPHONE ENCOUNTER
Called patient  She is agreeable to both  Please place orders for PT and also steroid  Also, she took a naproxen this AM  Does she need to wait to start steroid?

## 2020-01-09 ENCOUNTER — TELEPHONE (OUTPATIENT)
Dept: FAMILY MEDICINE CLINIC | Facility: CLINIC | Age: 40
End: 2020-01-09

## 2020-01-09 DIAGNOSIS — M79.2 NEUROPATHIC PAIN: Primary | ICD-10-CM

## 2020-01-09 RX ORDER — GABAPENTIN 100 MG/1
100 CAPSULE ORAL 3 TIMES DAILY
Qty: 30 CAPSULE | Refills: 1 | Status: SHIPPED | OUTPATIENT
Start: 2020-01-09 | End: 2020-01-09 | Stop reason: SDUPTHER

## 2020-01-09 RX ORDER — GABAPENTIN 100 MG/1
100 CAPSULE ORAL 3 TIMES DAILY
Qty: 30 CAPSULE | Refills: 1 | Status: SHIPPED | OUTPATIENT
Start: 2020-01-09 | End: 2022-06-16 | Stop reason: ALTCHOICE

## 2020-01-09 NOTE — TELEPHONE ENCOUNTER
Patient called stating she is still having pain upper thigh and upper calf  She stated she thinks its nerve pain  She stated she finished the steroids  Patient has an apt with Physical therapy tomorrow at 9 :30  Can patient continue to  take Naproxen or is Hydrocodone an option for her for the pain? She is having trouble driving and also ask if  she needs another round of steroids? Please advise  Patient would like a return call

## 2020-01-09 NOTE — TELEPHONE ENCOUNTER
I spoke to patient and gave message  She also stated she wouldn't be in the area to  prescription if it can now be Re routed to [facility] [address]

## 2020-01-09 NOTE — TELEPHONE ENCOUNTER
She can restart the naproxen, and I am adding a nerve pain pill that she can start and try that for 1 week while starting PT and call with an update because the dose of the nerve pill may need to be increased

## 2020-01-10 ENCOUNTER — EVALUATION (OUTPATIENT)
Dept: PHYSICAL THERAPY | Facility: MEDICAL CENTER | Age: 40
End: 2020-01-10
Payer: COMMERCIAL

## 2020-01-10 DIAGNOSIS — M54.31 SCIATICA OF RIGHT SIDE: ICD-10-CM

## 2020-01-10 PROCEDURE — 97010 HOT OR COLD PACKS THERAPY: CPT | Performed by: PHYSICAL THERAPIST

## 2020-01-10 PROCEDURE — 97140 MANUAL THERAPY 1/> REGIONS: CPT | Performed by: PHYSICAL THERAPIST

## 2020-01-10 PROCEDURE — 97162 PT EVAL MOD COMPLEX 30 MIN: CPT | Performed by: PHYSICAL THERAPIST

## 2020-01-10 NOTE — PROGRESS NOTES
PT Evaluation     Today's date: 1/10/2020  Patient name: Vinh Dozier  : 1980  MRN: [id number]  Referring provider: Rhea Bynum MD  Dx:   Encounter Diagnosis     ICD-10-CM    1  Sciatica of right side M54 31 Ambulatory referral to Physical Therapy                  Assessment/Plan  Patient is a very pleasant 34 yo female who presents with several weeks of back pain and right sided radiculopathy   Patient's symptoms are consistent with L5-S1 nerve compression most likely due to disc herniation following lifting her 3year old child  Jair Samaniego demonstrates hypomobility issues of the lumbar spine with dural signs present on right  Sandeep Cartagena is having issues of radiculopathy into his right posterior leg and at times into the bottom of the foot   loss of strength of the great toe extensors will be closely monitored but at this time does not require a referral out   Patient will benefit from skilled PT services to address his pain and return her to normal level of function and gym as desired   2x a week for 5-7 weeks  Subjective   Patient states that she has been having 4 weeks of severe pain into her right leg  Patient notes that her symptoms have not been responding to the medication she has been given and is fearful that her symptoms are worsening  Patient is having issues with driving and sitting which she must do for work  She denies changes in bowel and bladder function  Objective  Red Flags: none  Pain: 6/10 into right LE   Reflexes: 2+ B LE  Posture: normal   AROM: decrease forward flexion with increased symptom into right leg  PROM: deferred  PAROM: decreased with UPA from L3-5 right with comparable sign    Palpation: posterior hip and leg pain with gentle touch    No other neurological signs    Special Tests: + dural sign (crossed leg)  Coordination of Movement/strengthe: Patient demonstrates poor activation of Transversus Abdominus and multifidus force couple and loss of feed forward mechanism with reaching tasks    Patient was able to perform activation with cueing       Goals:  - Pt I with initial HEP in 1-2 visits  - centralize symptoms and reduce pain by 50%  - improved stabilizing structure activation and improved feed forward mechanism with distal activation  - Increase Functional Status Measure measured by FOTO by Rachel Fee will be able to drive without pain    Precautions: none         Precautions: none      Manual  1/10/2020              L4-s1 UPA right Gr  Iv-            Right rotation mob Gr  Iv-            Long leg distraction Gr   Iv-                                          Exercise Diary                                                                                                                                                                                                                                                                                      Modalities              Heat supine 10min

## 2020-01-14 ENCOUNTER — OFFICE VISIT (OUTPATIENT)
Dept: PHYSICAL THERAPY | Facility: MEDICAL CENTER | Age: 40
End: 2020-01-14
Payer: COMMERCIAL

## 2020-01-14 DIAGNOSIS — M54.31 SCIATICA OF RIGHT SIDE: Primary | ICD-10-CM

## 2020-01-14 PROCEDURE — 97110 THERAPEUTIC EXERCISES: CPT | Performed by: PHYSICAL THERAPIST

## 2020-01-14 PROCEDURE — 97012 MECHANICAL TRACTION THERAPY: CPT | Performed by: PHYSICAL THERAPIST

## 2020-01-14 PROCEDURE — 97140 MANUAL THERAPY 1/> REGIONS: CPT | Performed by: PHYSICAL THERAPIST

## 2020-01-14 NOTE — PROGRESS NOTES
Daily Note     Today's date: 2020  Patient name: Anam Mathews  : 1980  MRN: [id number]  Referring provider: Nurys Valente MD  Dx:   Encounter Diagnosis     ICD-10-CM    1  Sciatica of right side M54 31                   Subjective: patient states that she is feeling better than last visit  Objective: See treatment diary below      Assessment: Tolerated treatment well  Patient demonstrated fatigue post treatment, exhibited good technique with therapeutic exercises and would benefit from continued PT      Plan: Continue per plan of care  Precautions: none      Manual  1/10/2020              L4-s1 UPA right Gr  Iv-            Right rotation mob Gr  Iv-            Long leg distraction Gr   Iv-                                          Exercise Diary              Prone press ups [id number]                                                                                                                                                                                                                                                                       Modalities              Heat supine 10min            Mechanical traction 8min 75lbs 30/5

## 2020-01-17 ENCOUNTER — OFFICE VISIT (OUTPATIENT)
Dept: PHYSICAL THERAPY | Facility: MEDICAL CENTER | Age: 40
End: 2020-01-17
Payer: COMMERCIAL

## 2020-01-17 DIAGNOSIS — M54.31 SCIATICA OF RIGHT SIDE: Primary | ICD-10-CM

## 2020-01-17 PROCEDURE — 97140 MANUAL THERAPY 1/> REGIONS: CPT | Performed by: PHYSICAL MEDICINE & REHABILITATION

## 2020-01-17 PROCEDURE — 97110 THERAPEUTIC EXERCISES: CPT | Performed by: PHYSICAL MEDICINE & REHABILITATION

## 2020-01-17 PROCEDURE — 97012 MECHANICAL TRACTION THERAPY: CPT | Performed by: PHYSICAL MEDICINE & REHABILITATION

## 2020-01-17 NOTE — PROGRESS NOTES
Daily Note     Today's date: 2020  Patient name: Leilani Mckeon  : 1980  MRN: [id number]  Referring provider: Uday Bledsoe MD  Dx:   Encounter Diagnosis     ICD-10-CM    1  Sciatica of right side M54 31                   Subjective: Evette Talavera reported "I am having some pain on the right side and some numbness along my right hamstring "      Objective: See treatment diary below      Assessment: Tolerated treatment well  Patient would benefit from continued PT  Evette Talavera reported abolishment of her pain with prone extensions with OP  She continued to experience some numbness however  Following the session she was able to ambulate with more ease  Plan: Continue per plan of care  Precautions: none      Manual  1/10/2020   2020           L4-s1 UPA right Gr  Iv- JR           Right rotation mob Gr  Iv- JR           Long leg distraction Gr   IvRemonia Yan                                         Exercise Diary              Prone press ups [id number] 2x10 OP                                                                                                                                                                                                                                                                      Modalities              Heat supine 10min            Mechanical traction 8min 75lbs 30/5

## 2020-01-20 ENCOUNTER — APPOINTMENT (OUTPATIENT)
Dept: PHYSICAL THERAPY | Facility: MEDICAL CENTER | Age: 40
End: 2020-01-20
Payer: COMMERCIAL

## 2020-01-22 ENCOUNTER — OFFICE VISIT (OUTPATIENT)
Dept: PHYSICAL THERAPY | Facility: MEDICAL CENTER | Age: 40
End: 2020-01-22
Payer: COMMERCIAL

## 2020-01-22 DIAGNOSIS — M54.31 SCIATICA OF RIGHT SIDE: Primary | ICD-10-CM

## 2020-01-22 PROCEDURE — 97012 MECHANICAL TRACTION THERAPY: CPT | Performed by: PHYSICAL THERAPIST

## 2020-01-22 PROCEDURE — 97110 THERAPEUTIC EXERCISES: CPT | Performed by: PHYSICAL THERAPIST

## 2020-01-22 PROCEDURE — 97140 MANUAL THERAPY 1/> REGIONS: CPT | Performed by: PHYSICAL THERAPIST

## 2020-01-22 NOTE — PROGRESS NOTES
Daily Note     Today's date: 2020  Patient name: Traci Sheppard  : 1980  MRN: [id number]  Referring provider: Kobe Chowdhury MD  Dx:   Encounter Diagnosis     ICD-10-CM    1  Sciatica of right side M54 31                   Subjective: Arleth Brown reported "I am having much less pain with driving and walking  Pain is getting better and not in the leg almost at all anymore"      Objective: See treatment diary below      Assessment: Tolerated treatment well  Patient would benefit from continued PT  Patient is making progress however numbing in the right gluteal area persists  Plan: Continue per plan of care  Precautions: none      Manual  2020              L4-s1 UPA right Gr  Iv-            Right rotation mob Gr  Iv-            Long leg distraction Gr   Iv-                                          Exercise Diary              Prone press ups with over pressure [id number]            Pendulum swings 15x             Seated nerve glides 10x2            Supine hip extension isometrics on ball 10x2                                                                                                                                                                                                                                Modalities              Heat supine 10min            Mechanical traction 8min 85lbs 30/5

## 2020-01-24 ENCOUNTER — APPOINTMENT (OUTPATIENT)
Dept: PHYSICAL THERAPY | Facility: MEDICAL CENTER | Age: 40
End: 2020-01-24
Payer: COMMERCIAL

## 2020-02-18 ENCOUNTER — TELEPHONE (OUTPATIENT)
Dept: FAMILY MEDICINE CLINIC | Facility: CLINIC | Age: 40
End: 2020-02-18

## 2020-02-19 PROCEDURE — 87491 CHLMYD TRACH DNA AMP PROBE: CPT | Performed by: OBSTETRICS & GYNECOLOGY

## 2020-02-19 PROCEDURE — 87591 N.GONORRHOEAE DNA AMP PROB: CPT | Performed by: OBSTETRICS & GYNECOLOGY

## 2020-02-20 ENCOUNTER — LAB REQUISITION (OUTPATIENT)
Dept: LAB | Facility: HOSPITAL | Age: 40
End: 2020-02-20
Payer: COMMERCIAL

## 2020-02-20 DIAGNOSIS — Z01.419 ENCOUNTER FOR GYNECOLOGICAL EXAMINATION (GENERAL) (ROUTINE) WITHOUT ABNORMAL FINDINGS: ICD-10-CM

## 2020-02-20 PROCEDURE — G0124 SCREEN C/V THIN LAYER BY MD: HCPCS | Performed by: PATHOLOGY

## 2020-02-20 PROCEDURE — G0145 SCR C/V CYTO,THINLAYER,RESCR: HCPCS | Performed by: PATHOLOGY

## 2020-02-21 LAB
C TRACH DNA SPEC QL NAA+PROBE: NEGATIVE
HPV HR 12 DNA CVX QL NAA+PROBE: POSITIVE
HPV16 DNA CVX QL NAA+PROBE: NEGATIVE
HPV18 DNA CVX QL NAA+PROBE: NEGATIVE
N GONORRHOEA DNA SPEC QL NAA+PROBE: NEGATIVE

## 2020-02-21 PROCEDURE — 87624 HPV HI-RISK TYP POOLED RSLT: CPT | Performed by: OBSTETRICS & GYNECOLOGY

## 2020-02-27 LAB
LAB AP GYN PRIMARY INTERPRETATION: ABNORMAL
LAB AP LMP: ABNORMAL
Lab: ABNORMAL
PATH INTERP SPEC-IMP: ABNORMAL
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
## 2020-03-17 ENCOUNTER — TELEPHONE (OUTPATIENT)
Dept: FAMILY MEDICINE CLINIC | Facility: CLINIC | Age: 40
End: 2020-03-17

## 2020-03-17 NOTE — TELEPHONE ENCOUNTER
Last time we used naproxen which did help her pain, she can use 2 tablets of naproxen over-the-counter twice a day for 1 week  If her symptoms persist, she should come in to be evaluated at that time

## 2020-03-17 NOTE — TELEPHONE ENCOUNTER
Patient called stating she is having lower back pain  Should patient be seen for this again? Is there anything that can be sent in for her without being seen? Please advise   [phone]

## 2020-06-17 PROCEDURE — 87491 CHLMYD TRACH DNA AMP PROBE: CPT | Performed by: OBSTETRICS & GYNECOLOGY

## 2020-06-17 PROCEDURE — 87591 N.GONORRHOEAE DNA AMP PROB: CPT | Performed by: OBSTETRICS & GYNECOLOGY

## 2020-06-18 ENCOUNTER — LAB REQUISITION (OUTPATIENT)
Dept: LAB | Facility: HOSPITAL | Age: 40
End: 2020-06-18
Payer: COMMERCIAL

## 2020-06-18 DIAGNOSIS — O09.90 SUPERVISION OF HIGH RISK PREGNANCY, UNSPECIFIED, UNSPECIFIED TRIMESTER: ICD-10-CM

## 2020-06-22 LAB
C TRACH DNA SPEC QL NAA+PROBE: NEGATIVE
N GONORRHOEA DNA SPEC QL NAA+PROBE: NEGATIVE

## 2020-11-10 ENCOUNTER — TELEPHONE (OUTPATIENT)
Dept: FAMILY MEDICINE CLINIC | Facility: CLINIC | Age: 40
End: 2020-11-10

## 2020-11-10 DIAGNOSIS — Z11.59 ENCOUNTER FOR SCREENING FOR OTHER VIRAL DISEASES: Primary | ICD-10-CM

## 2020-11-12 DIAGNOSIS — Z11.59 ENCOUNTER FOR SCREENING FOR OTHER VIRAL DISEASES: ICD-10-CM

## 2020-11-12 PROCEDURE — U0003 INFECTIOUS AGENT DETECTION BY NUCLEIC ACID (DNA OR RNA); SEVERE ACUTE RESPIRATORY SYNDROME CORONAVIRUS 2 (SARS-COV-2) (CORONAVIRUS DISEASE [COVID-19]), AMPLIFIED PROBE TECHNIQUE, MAKING USE OF HIGH THROUGHPUT TECHNOLOGIES AS DESCRIBED BY CMS-2020-01-R: HCPCS | Performed by: FAMILY MEDICINE

## 2020-11-13 ENCOUNTER — TELEPHONE (OUTPATIENT)
Dept: FAMILY MEDICINE CLINIC | Facility: CLINIC | Age: 40
End: 2020-11-13

## 2020-11-13 LAB — SARS-COV-2 RNA SPEC QL NAA+PROBE: NOT DETECTED

## 2021-02-03 ENCOUNTER — OFFICE VISIT (OUTPATIENT)
Dept: FAMILY MEDICINE CLINIC | Facility: CLINIC | Age: 41
End: 2021-02-03
Payer: COMMERCIAL

## 2021-02-03 VITALS
SYSTOLIC BLOOD PRESSURE: 110 MMHG | HEART RATE: 92 BPM | HEIGHT: 71 IN | WEIGHT: 249 LBS | OXYGEN SATURATION: 98 % | DIASTOLIC BLOOD PRESSURE: 80 MMHG | BODY MASS INDEX: 34.86 KG/M2 | TEMPERATURE: 97.8 F

## 2021-02-03 DIAGNOSIS — N30.01 ACUTE CYSTITIS WITH HEMATURIA: Primary | ICD-10-CM

## 2021-02-03 DIAGNOSIS — Z12.31 ENCOUNTER FOR SCREENING MAMMOGRAM FOR MALIGNANT NEOPLASM OF BREAST: ICD-10-CM

## 2021-02-03 LAB
SL AMB  POCT GLUCOSE, UA: ABNORMAL
SL AMB LEUKOCYTE ESTERASE,UA: 125
SL AMB POCT BILIRUBIN,UA: ABNORMAL
SL AMB POCT BLOOD,UA: ABNORMAL
SL AMB POCT CLARITY,UA: ABNORMAL
SL AMB POCT COLOR,UA: YELLOW
SL AMB POCT KETONES,UA: ABNORMAL
SL AMB POCT NITRITE,UA: ABNORMAL
SL AMB POCT PH,UA: 5
SL AMB POCT SPECIFIC GRAVITY,UA: 1.01
SL AMB POCT URINE PROTEIN: ABNORMAL
SL AMB POCT UROBILINOGEN: 0.2

## 2021-02-03 PROCEDURE — 87077 CULTURE AEROBIC IDENTIFY: CPT | Performed by: PHYSICIAN ASSISTANT

## 2021-02-03 PROCEDURE — 1036F TOBACCO NON-USER: CPT | Performed by: PHYSICIAN ASSISTANT

## 2021-02-03 PROCEDURE — 87186 SC STD MICRODIL/AGAR DIL: CPT | Performed by: PHYSICIAN ASSISTANT

## 2021-02-03 PROCEDURE — 99214 OFFICE O/P EST MOD 30 MIN: CPT | Performed by: PHYSICIAN ASSISTANT

## 2021-02-03 PROCEDURE — 87086 URINE CULTURE/COLONY COUNT: CPT | Performed by: PHYSICIAN ASSISTANT

## 2021-02-03 PROCEDURE — 3725F SCREEN DEPRESSION PERFORMED: CPT | Performed by: PHYSICIAN ASSISTANT

## 2021-02-03 PROCEDURE — 81002 URINALYSIS NONAUTO W/O SCOPE: CPT | Performed by: PHYSICIAN ASSISTANT

## 2021-02-03 PROCEDURE — 3008F BODY MASS INDEX DOCD: CPT | Performed by: PHYSICIAN ASSISTANT

## 2021-02-03 RX ORDER — METRONIDAZOLE 7.5 MG/G
GEL VAGINAL DAILY
COMMUNITY
End: 2022-06-16 | Stop reason: ALTCHOICE

## 2021-02-03 RX ORDER — NITROFURANTOIN 25; 75 MG/1; MG/1
100 CAPSULE ORAL 2 TIMES DAILY
Qty: 10 CAPSULE | Refills: 0 | Status: SHIPPED | OUTPATIENT
Start: 2021-02-03 | End: 2021-02-08

## 2021-02-03 NOTE — PROGRESS NOTES
Assessment/Plan:     Diagnoses and all orders for this visit:    Acute cystitis with hematuria  POCT UA positive for Leuks, microscopic hematuria  - ordered Urine culture and microscopy  Will call if needing to change antibiotic  - Directed to return if fever, chills, flank pain, continued hematuria despite antibiotics  - Ordered Macrobid 5 days  -     nitrofurantoin (MACROBID) 100 mg capsule; Take 1 capsule (100 mg total) by mouth 2 (two) times a day for 5 days    Encounter for screening mammogram for malignant neoplasm of breast  -     Mammo screening bilateral w cad; Future    BMI Counseling: Body mass index is 34 73 kg/m²  The BMI is above normal  Nutrition recommendations include reducing portion sizes  Exercise recommendations include moderate aerobic physical activity for 150 minutes/week  Subjective:    Patient ID: Curtis Landa is a 38 y o  female  Pt is presenting today for Burning with urination and increased frequency for 4 days  Slight improvement with cranberry  Urinary Tract Infection   This is a new problem  Episode onset: 4 days  The problem has been unchanged  The quality of the pain is described as burning  The pain is mild  There has been no fever  She is sexually active  There is no history of pyelonephritis  Associated symptoms include urgency  Pertinent negatives include no chills, flank pain, hematuria or nausea  Treatments tried: Cranberry juice  There is no history of kidney stones or recurrent UTIs  The following portions of the patient's history were reviewed and updated as appropriate: allergies, current medications and problem list     Review of Systems   Constitutional: Negative for chills, fatigue, fever and unexpected weight change  HENT: Negative for rhinorrhea and sore throat  Respiratory: Negative for cough, shortness of breath and wheezing  Cardiovascular: Negative for chest pain, palpitations and leg swelling     Gastrointestinal: Negative for constipation, diarrhea and nausea  Genitourinary: Positive for dysuria and urgency  Negative for flank pain and hematuria  Musculoskeletal: Negative for arthralgias and myalgias  Objective:  /80 (BP Location: Left arm, Patient Position: Sitting, Cuff Size: Standard)   Pulse 92   Temp 97 8 °F (36 6 °C)   Ht 5' 11" (1 803 m)   Wt 113 kg (249 lb)   SpO2 98%   BMI 34 73 kg/m²      Physical Exam  Constitutional:       General: She is not in acute distress  Appearance: Normal appearance  She is well-developed  She is not diaphoretic  Eyes:      Pupils: Pupils are equal, round, and reactive to light  Neck:      Musculoskeletal: Normal range of motion and neck supple  Cardiovascular:      Rate and Rhythm: Normal rate and regular rhythm  Heart sounds: Normal heart sounds  No murmur  No friction rub  No gallop  Pulmonary:      Effort: Pulmonary effort is normal  No respiratory distress  Breath sounds: Normal breath sounds  No wheezing or rales  Abdominal:      General: There is no distension  Tenderness: There is no abdominal tenderness  Neurological:      Mental Status: She is alert and oriented to person, place, and time

## 2021-02-05 LAB — BACTERIA UR CULT: ABNORMAL

## 2021-03-19 PROCEDURE — G0145 SCR C/V CYTO,THINLAYER,RESCR: HCPCS | Performed by: OBSTETRICS & GYNECOLOGY

## 2021-03-19 PROCEDURE — 87624 HPV HI-RISK TYP POOLED RSLT: CPT | Performed by: OBSTETRICS & GYNECOLOGY

## 2021-03-22 ENCOUNTER — LAB REQUISITION (OUTPATIENT)
Dept: LAB | Facility: HOSPITAL | Age: 41
End: 2021-03-22
Payer: COMMERCIAL

## 2021-03-22 DIAGNOSIS — Z12.4 ENCOUNTER FOR SCREENING FOR MALIGNANT NEOPLASM OF CERVIX: ICD-10-CM

## 2021-03-24 LAB
HPV HR 12 DNA CVX QL NAA+PROBE: POSITIVE
HPV16 DNA CVX QL NAA+PROBE: NEGATIVE
HPV18 DNA CVX QL NAA+PROBE: NEGATIVE

## 2021-03-25 LAB
LAB AP GYN PRIMARY INTERPRETATION: NORMAL
LAB AP LMP: NORMAL
Lab: NORMAL
PATH INTERP SPEC-IMP: NORMAL

## 2021-04-15 ENCOUNTER — IMMUNIZATIONS (OUTPATIENT)
Dept: FAMILY MEDICINE CLINIC | Facility: HOSPITAL | Age: 41
End: 2021-04-15

## 2021-04-15 DIAGNOSIS — Z23 ENCOUNTER FOR IMMUNIZATION: Primary | ICD-10-CM

## 2021-04-15 PROCEDURE — 0001A SARS-COV-2 / COVID-19 MRNA VACCINE (PFIZER-BIONTECH) 30 MCG: CPT

## 2021-04-15 PROCEDURE — 91300 SARS-COV-2 / COVID-19 MRNA VACCINE (PFIZER-BIONTECH) 30 MCG: CPT

## 2021-04-28 PROCEDURE — 88305 TISSUE EXAM BY PATHOLOGIST: CPT | Performed by: PATHOLOGY

## 2021-04-28 PROCEDURE — 88344 IMHCHEM/IMCYTCHM EA MLT ANTB: CPT | Performed by: PATHOLOGY

## 2021-04-29 ENCOUNTER — LAB REQUISITION (OUTPATIENT)
Dept: LAB | Facility: HOSPITAL | Age: 41
End: 2021-04-29
Payer: COMMERCIAL

## 2021-04-29 DIAGNOSIS — R87.810 CERVICAL HIGH RISK HUMAN PAPILLOMAVIRUS (HPV) DNA TEST POSITIVE: ICD-10-CM

## 2021-05-06 ENCOUNTER — IMMUNIZATIONS (OUTPATIENT)
Dept: FAMILY MEDICINE CLINIC | Facility: HOSPITAL | Age: 41
End: 2021-05-06

## 2021-05-06 DIAGNOSIS — Z23 ENCOUNTER FOR IMMUNIZATION: Primary | ICD-10-CM

## 2021-05-06 PROCEDURE — 0002A SARS-COV-2 / COVID-19 MRNA VACCINE (PFIZER-BIONTECH) 30 MCG: CPT

## 2021-05-06 PROCEDURE — 91300 SARS-COV-2 / COVID-19 MRNA VACCINE (PFIZER-BIONTECH) 30 MCG: CPT

## 2021-07-28 ENCOUNTER — HOSPITAL ENCOUNTER (OUTPATIENT)
Dept: MAMMOGRAPHY | Facility: HOSPITAL | Age: 41
Discharge: HOME | End: 2021-07-28
Payer: COMMERCIAL

## 2021-07-28 VITALS — HEIGHT: 71 IN | BODY MASS INDEX: 34.86 KG/M2 | WEIGHT: 249 LBS

## 2021-07-28 DIAGNOSIS — Z12.31 ENCOUNTER FOR SCREENING MAMMOGRAM FOR MALIGNANT NEOPLASM OF BREAST: ICD-10-CM

## 2021-07-28 PROCEDURE — 77067 SCR MAMMO BI INCL CAD: CPT

## 2021-07-28 PROCEDURE — 77063 BREAST TOMOSYNTHESIS BI: CPT

## 2021-12-29 ENCOUNTER — TELEMEDICINE (OUTPATIENT)
Dept: FAMILY MEDICINE CLINIC | Facility: CLINIC | Age: 41
End: 2021-12-29
Payer: COMMERCIAL

## 2021-12-29 DIAGNOSIS — A08.4 VIRAL GASTROENTERITIS: Primary | ICD-10-CM

## 2021-12-29 PROCEDURE — 1036F TOBACCO NON-USER: CPT | Performed by: FAMILY MEDICINE

## 2021-12-29 PROCEDURE — 99213 OFFICE O/P EST LOW 20 MIN: CPT | Performed by: FAMILY MEDICINE

## 2022-01-04 ENCOUNTER — TELEMEDICINE (OUTPATIENT)
Dept: FAMILY MEDICINE CLINIC | Facility: CLINIC | Age: 42
End: 2022-01-04
Payer: COMMERCIAL

## 2022-01-04 DIAGNOSIS — U07.1 COVID-19: Primary | ICD-10-CM

## 2022-01-04 PROBLEM — R92.30 DENSE BREAST TISSUE ON MAMMOGRAM: Status: ACTIVE | Noted: 2017-08-01

## 2022-01-04 PROBLEM — R92.2 DENSE BREAST TISSUE ON MAMMOGRAM: Status: ACTIVE | Noted: 2017-08-01

## 2022-01-04 PROCEDURE — U0005 INFEC AGEN DETEC AMPLI PROBE: HCPCS | Performed by: FAMILY MEDICINE

## 2022-01-04 PROCEDURE — 99213 OFFICE O/P EST LOW 20 MIN: CPT | Performed by: FAMILY MEDICINE

## 2022-01-04 PROCEDURE — 1036F TOBACCO NON-USER: CPT | Performed by: FAMILY MEDICINE

## 2022-01-04 PROCEDURE — U0003 INFECTIOUS AGENT DETECTION BY NUCLEIC ACID (DNA OR RNA); SEVERE ACUTE RESPIRATORY SYNDROME CORONAVIRUS 2 (SARS-COV-2) (CORONAVIRUS DISEASE [COVID-19]), AMPLIFIED PROBE TECHNIQUE, MAKING USE OF HIGH THROUGHPUT TECHNOLOGIES AS DESCRIBED BY CMS-2020-01-R: HCPCS | Performed by: FAMILY MEDICINE

## 2022-01-04 NOTE — PROGRESS NOTES
COVID-19 Outpatient Progress Note    Assessment/Plan:    Problem List Items Addressed This Visit     None      Visit Diagnoses     COVID-19    -  Primary         Disposition:     Recommended patient to come to the office to test for COVID-19  Patient is fully vaccinated and I recommended self quarantine for 5 days followed by strict mask use for an additional 5 days  If patient were to develop symptoms, they should immediately self isolate and call our office for further guidance  I have spent 6 minutes directly with the patient  Greater than 50% of this time was spent in counseling/coordination of care regarding: diagnostic results, prognosis, risks and benefits of treatment options, instructions for management, importance of treatment compliance and impressions  Continue with symptom treatment  Advised to call if any changes  Encounter provider Kristine Youngblood MD    Provider located at [place] [address]  [phone]    Recent Visits  Date Type Provider Dept   12/29/21 [address], MD 2 [facility] recent visits within past 7 days and meeting all other requirements  Future Appointments  No visits were found meeting these conditions  Showing future appointments within next 150 days and meeting all other requirements     This virtual check-in was done via [employer] and patient was informed that this is a secure, HIPAA-compliant platform  She agrees to proceed  Patient agrees to participate in a virtual check in via telephone or video visit instead of presenting to the office to address urgent/immediate medical needs  Patient is aware this is a billable service  After connecting through [facility], the patient was identified by name and date of birth  Karolyn Tillmanblaire was informed that this was a telemedicine visit and that the exam was being conducted confidentially over secure lines  My office door was closed   No one else was in the room  Naila Combs acknowledged consent and understanding of privacy and security of the telemedicine visit  I informed the patient that I have reviewed her record in Epic and presented the opportunity for her to ask any questions regarding the visit today  The patient agreed to participate  Verification of patient location:  Patient is located in the following state in which I hold an active license: [place]    Subjective:   Naila Combs is a 42 y o  female who is concerned about COVID-19  Patient's symptoms include fever, chills, fatigue, nasal congestion, sore throat and cough (mild cough )  Patient denies malaise, anosmia, loss of taste, shortness of breath, chest tightness, abdominal pain, nausea, vomiting, diarrhea, myalgias and headaches  Date of symptom onset: 12/31/2021  COVID-19 vaccination status: Fully vaccinated (primary series)    Exposure:   Contact with a person who is under investigation (PUI) for or who is positive for COVID-19 within the last 14 days?: No    Hospitalized recently for fever and/or lower respiratory symptoms?: No      Currently a healthcare worker that is involved in direct patient care?: Yes      Works in a special setting where the risk of COVID-19 transmission may be high? (this may include long-term care, correctional and senior care facilities; homeless shelters; assisted-living facilities and group homes ): No      Resident in a special setting where the risk of COVID-19 transmission may be high? (this may include long-term care, correctional and senior care facilities; homeless shelters; assisted-living facilities and group homes ): No      Symptoms started 12/31/2021  Home COV 19 test positive today  Her employer required a PCR test  Occupation physical therapist      Lab Results   Component Value Date    Sue Parent Not Detected 11/12/2020     No past medical history on file    Past Surgical History:   Procedure Laterality Date    VAGINAL DELIVERY Current Outpatient Medications   Medication Sig Dispense Refill    cephalexin (KEFLEX) 250 mg capsule Take by mouth      gabapentin (NEURONTIN) 100 mg capsule Take 1 capsule (100 mg total) by mouth 3 (three) times a day (Patient not taking: Reported on 2/3/2021) 30 capsule 1    metroNIDAZOLE (METROGEL VAGINAL) 0 75 % vaginal gel Daily      naproxen (NAPROSYN) 500 mg tablet Take 1 tablet (500 mg total) by mouth 2 (two) times a day with meals for 10 days 20 tablet 0     No current facility-administered medications for this visit  Allergies   Allergen Reactions    Pollen Extract        Review of Systems   Constitutional: Positive for chills, fatigue and fever  HENT: Positive for congestion and sore throat  Respiratory: Positive for cough (mild cough )  Negative for chest tightness and shortness of breath  Gastrointestinal: Negative for abdominal pain, diarrhea, nausea and vomiting  Musculoskeletal: Negative for myalgias  Skin: Negative for rash  Neurological: Negative for headaches  Objective: There were no vitals filed for this visit  Physical Exam  Constitutional:       General: She is not in acute distress  Eyes:      General: No scleral icterus  Conjunctiva/sclera: Conjunctivae normal    Pulmonary:      Effort: Pulmonary effort is normal  No respiratory distress  Breath sounds: No wheezing  Comments: No audible wheezing   Neurological:      Mental Status: She is alert and oriented to person, place, and time  Psychiatric:         Mood and Affect: Mood normal          Behavior: Behavior normal          VIRTUAL VISIT DISCLAIMER    Ceferino Talley verbally agrees to participate in [employer]   Pt is aware that [employer] could be limited without vital signs or the ability to perform a full hands-on physical Blondell Hasten understands she or the provider may request at any time to terminate the video visit and request the patient to seek care or treatment in person

## 2022-01-05 LAB — SARS-COV-2 RNA RESP QL NAA+PROBE: POSITIVE

## 2022-02-21 ENCOUNTER — APPOINTMENT (OUTPATIENT)
Dept: LAB | Facility: CLINIC | Age: 42
End: 2022-02-21

## 2022-02-21 ENCOUNTER — OCCMED (OUTPATIENT)
Dept: URGENT CARE | Facility: CLINIC | Age: 42
End: 2022-02-21

## 2022-02-21 DIAGNOSIS — Z02.1 PHYSICAL EXAM, PRE-EMPLOYMENT: Primary | ICD-10-CM

## 2022-02-21 DIAGNOSIS — Z02.1 PHYSICAL EXAM, PRE-EMPLOYMENT: ICD-10-CM

## 2022-02-21 LAB — RUBV IGG SERPL IA-ACNC: 150.2 IU/ML

## 2022-02-21 PROCEDURE — 36415 COLL VENOUS BLD VENIPUNCTURE: CPT

## 2022-02-21 PROCEDURE — 86735 MUMPS ANTIBODY: CPT

## 2022-02-21 PROCEDURE — 86480 TB TEST CELL IMMUN MEASURE: CPT

## 2022-02-21 PROCEDURE — 86787 VARICELLA-ZOSTER ANTIBODY: CPT

## 2022-02-21 PROCEDURE — 86765 RUBEOLA ANTIBODY: CPT

## 2022-02-21 PROCEDURE — 86762 RUBELLA ANTIBODY: CPT

## 2022-02-22 LAB
GAMMA INTERFERON BACKGROUND BLD IA-ACNC: 0.05 IU/ML
M TB IFN-G BLD-IMP: NEGATIVE
M TB IFN-G CD4+ BCKGRND COR BLD-ACNC: -0.01 IU/ML
M TB IFN-G CD4+ BCKGRND COR BLD-ACNC: -0.02 IU/ML
MEV IGG SER QL: NORMAL
MITOGEN IGNF BCKGRD COR BLD-ACNC: 9.71 IU/ML
MUV IGG SER QL: ABNORMAL
VZV IGG SER IA-ACNC: NORMAL

## 2022-04-28 DIAGNOSIS — Z01.84 IMMUNITY STATUS TESTING: Primary | ICD-10-CM

## 2022-05-06 ENCOUNTER — APPOINTMENT (OUTPATIENT)
Dept: LAB | Facility: MEDICAL CENTER | Age: 42
End: 2022-05-06

## 2022-05-06 DIAGNOSIS — Z01.84 IMMUNITY STATUS TESTING: ICD-10-CM

## 2022-05-06 PROCEDURE — 36415 COLL VENOUS BLD VENIPUNCTURE: CPT

## 2022-05-06 PROCEDURE — 86735 MUMPS ANTIBODY: CPT

## 2022-05-10 LAB — MUV IGG SER QL: ABNORMAL

## 2022-06-16 ENCOUNTER — OFFICE VISIT (OUTPATIENT)
Dept: FAMILY MEDICINE CLINIC | Facility: CLINIC | Age: 42
End: 2022-06-16
Payer: COMMERCIAL

## 2022-06-16 VITALS
BODY MASS INDEX: 35.49 KG/M2 | OXYGEN SATURATION: 98 % | TEMPERATURE: 98 F | SYSTOLIC BLOOD PRESSURE: 110 MMHG | DIASTOLIC BLOOD PRESSURE: 70 MMHG | HEIGHT: 72 IN | HEART RATE: 86 BPM | WEIGHT: 262 LBS

## 2022-06-16 DIAGNOSIS — Z00.00 PHYSICAL EXAM, ANNUAL: Primary | ICD-10-CM

## 2022-06-16 DIAGNOSIS — Z00.00 ANNUAL PHYSICAL EXAM: ICD-10-CM

## 2022-06-16 DIAGNOSIS — Z00.8 EXAM FOR POPULATION SURVEY: ICD-10-CM

## 2022-06-16 DIAGNOSIS — Z12.31 ENCOUNTER FOR SCREENING MAMMOGRAM FOR MALIGNANT NEOPLASM OF BREAST: ICD-10-CM

## 2022-06-16 PROCEDURE — 99396 PREV VISIT EST AGE 40-64: CPT | Performed by: NURSE PRACTITIONER

## 2022-06-16 NOTE — PATIENT INSTRUCTIONS

## 2022-06-16 NOTE — PROGRESS NOTES
[address],[place] [address]    NAME: Noni Ovalle  AGE: 42 y o  SEX: female  : 1980     DATE: 2022     Assessment and Plan:     Problem List Items Addressed This Visit    None     Visit Diagnoses     Physical exam, annual    -  Primary    Exam for population survey        Relevant Orders    Lipid panel    HEMOGLOBIN A1C W/ EAG ESTIMATION    Encounter for screening mammogram for malignant neoplasm of breast        Relevant Orders    Mammo screening bilateral w 3d & cad    Annual physical exam            Immunizations and preventive care screenings were discussed with patient today  Appropriate education was printed on patient's after visit summary  Counseling:  Alcohol/drug use: discussed moderation in alcohol intake, the recommendations for healthy alcohol use, and avoidance of illicit drug use  Dental Health: discussed importance of regular tooth brushing, flossing, and dental visits  Injury prevention: discussed safety/seat belts, safety helmets, smoke detectors, carbon dioxide detectors, and smoking near bedding or upholstery  Exercise: the importance of regular exercise/physical activity was discussed  Recommend exercise 3-5 times per week for at least 30 minutes  BMI Counseling: Body mass index is 35 53 kg/m²  The BMI is above normal  Nutrition recommendations include decreasing portion sizes, encouraging healthy choices of fruits and vegetables, consuming healthier snacks, moderation in carbohydrate intake and increasing intake of lean protein  Exercise recommendations include exercising 3-5 times per week and strength training exercises  Rationale for BMI follow-up plan is due to patient being overweight or obese  Depression Screening and Follow-up Plan: Patient was screened for depression during today's encounter  They screened negative with a PHQ-2 score of 0  Return in about 1 year (around 2023)       Chief Complaint:     Chief Complaint   Patient presents with    Physical Exam      History of Present Illness:     Adult Annual Physical   Patient here for a comprehensive physical exam  The patient reports problems - left scapular tenderness  The tenderness is intermittent and and she is using all natural products to mange the discomfort  Diet and Physical Activity  Diet/Nutrition: well balanced diet and consuming 3-5 servings of fruits/vegetables daily  Exercise: moderate cardiovascular exercise, 3-4 times a week on average and 30-60 minutes on average  Depression Screening  PHQ-2/9 Depression Screening    Little interest or pleasure in doing things: 0 - not at all  Feeling down, depressed, or hopeless: 0 - not at all  PHQ-2 Score: 0  PHQ-2 Interpretation: Negative depression screen       General Health  Sleep: sleeps well and gets 4-6 hours of sleep on average  Hearing: normal - bilateral   Vision: no vision problems, goes for regular eye exams and wears glasses  Dental: regular dental visits, brushes teeth twice daily and flosses teeth occasionally  /GYN Health  Patient is: postmenopausal  Last menstrual period: unknown     Review of Systems:     Review of Systems   Constitutional: Negative for activity change, appetite change and unexpected weight change  HENT: Negative for dental problem, ear pain, hearing loss, nosebleeds, sneezing, sore throat, tinnitus and trouble swallowing  Eyes: Negative for visual disturbance  Respiratory: Negative for cough, chest tightness, shortness of breath and wheezing  Cardiovascular: Negative for chest pain, palpitations and leg swelling  Gastrointestinal: Negative for abdominal pain, constipation, diarrhea and nausea  Endocrine: Negative for polydipsia and polyuria  Genitourinary: Negative  Musculoskeletal: Negative for arthralgias, back pain, myalgias and neck pain  Skin: Negative for color change and rash     Allergic/Immunologic: Negative for environmental allergies  Neurological: Negative for dizziness, weakness, light-headedness and headaches  Hematological: Negative  Psychiatric/Behavioral: Negative  Negative for dysphoric mood and sleep disturbance  The patient is not nervous/anxious  Past Medical History:     History reviewed  No pertinent past medical history  Past Surgical History:     Past Surgical History:   Procedure Laterality Date    VAGINAL DELIVERY        Social History:     Social History     Socioeconomic History    Marital status: Single     Spouse name: None    Number of children: 2    Years of education: None    Highest education level: None   Occupational History    Occupation: Therapist   Tobacco Use    Smoking status: Never Smoker    Smokeless tobacco: Never Used   Substance and Sexual Activity    Alcohol use: Yes     Comment: social; denied per allscripts    Drug use: No    Sexual activity: None   Other Topics Concern    None   Social History Narrative    Lives with spouse and 2 children     Social Determinants of Health     Financial Resource Strain: Not on file   Food Insecurity: Not on file   Transportation Needs: Not on file   Physical Activity: Not on file   Stress: Not on file   Social Connections: Not on file   Intimate Partner Violence: Not on file   Housing Stability: Not on file      Family History:     Family History   Problem Relation Age of Onset    Breast cancer Mother 36    Stroke Father     Diabetes Father     Diabetes type II Father     No Known Problems Sister     No Known Problems Son     Ovarian cancer Maternal Aunt     No Known Problems Maternal Aunt     No Known Problems Maternal Aunt     No Known Problems Maternal Aunt     No Known Problems Maternal Aunt     No Known Problems Maternal Aunt     No Known Problems Paternal Aunt     No Known Problems Paternal Aunt       Current Medications:     No current outpatient medications on file       No current facility-administered medications for this visit  Allergies: Allergies   Allergen Reactions    Pollen Extract       Physical Exam:     /70 (BP Location: Left arm, Patient Position: Sitting, Cuff Size: Standard)   Pulse 86   Temp 98 °F (36 7 °C)   Ht 6' (1 829 m)   Wt 119 kg (262 lb)   SpO2 98%   BMI 35 53 kg/m² (Reviewed)    Physical Exam  Vitals reviewed  Constitutional:       General: She is not in acute distress  Appearance: Normal appearance  She is well-developed and well-groomed  She is not ill-appearing  HENT:      Head: Normocephalic and atraumatic  Right Ear: Tympanic membrane, ear canal and external ear normal       Left Ear: Tympanic membrane, ear canal and external ear normal       Nose: Nose normal       Mouth/Throat:      Lips: Pink  Mouth: Mucous membranes are moist       Pharynx: Oropharynx is clear  Eyes:      General: Lids are normal       Extraocular Movements: Extraocular movements intact  Conjunctiva/sclera: Conjunctivae normal       Pupils: Pupils are equal, round, and reactive to light  Neck:      Thyroid: No thyroid mass, thyromegaly or thyroid tenderness  Trachea: Trachea and phonation normal    Cardiovascular:      Rate and Rhythm: Normal rate and regular rhythm  Pulses: Normal pulses  Radial pulses are 2+ on the right side and 2+ on the left side  Dorsalis pedis pulses are 2+ on the right side and 2+ on the left side  Posterior tibial pulses are 2+ on the right side and 2+ on the left side  Heart sounds: Normal heart sounds  No murmur heard  No friction rub  No gallop  Pulmonary:      Effort: Pulmonary effort is normal       Breath sounds: Normal breath sounds  Abdominal:      General: Abdomen is flat  Bowel sounds are normal       Palpations: Abdomen is soft  Tenderness: There is no abdominal tenderness  Musculoskeletal:         General: Normal range of motion        Cervical back: Normal range of motion and neck supple  Right lower leg: No edema  Left lower leg: No edema  Skin:     General: Skin is warm and dry  Capillary Refill: Capillary refill takes less than 2 seconds  Neurological:      Mental Status: She is alert and oriented to person, place, and time  Cranial Nerves: Cranial nerves are intact  Motor: Motor function is intact  Psychiatric:         Mood and Affect: Mood normal          Behavior: Behavior normal  Behavior is cooperative            Ang [place], [id number] [address]

## 2022-06-30 ENCOUNTER — OFFICE VISIT (OUTPATIENT)
Dept: FAMILY MEDICINE CLINIC | Facility: CLINIC | Age: 42
End: 2022-06-30
Payer: COMMERCIAL

## 2022-06-30 VITALS
OXYGEN SATURATION: 98 % | TEMPERATURE: 96.8 F | DIASTOLIC BLOOD PRESSURE: 84 MMHG | HEIGHT: 72 IN | SYSTOLIC BLOOD PRESSURE: 122 MMHG | BODY MASS INDEX: 35.49 KG/M2 | WEIGHT: 262 LBS | HEART RATE: 72 BPM

## 2022-06-30 DIAGNOSIS — N30.01 ACUTE CYSTITIS WITH HEMATURIA: Primary | ICD-10-CM

## 2022-06-30 LAB
SL AMB  POCT GLUCOSE, UA: ABNORMAL
SL AMB LEUKOCYTE ESTERASE,UA: 70
SL AMB POCT BILIRUBIN,UA: ABNORMAL
SL AMB POCT BLOOD,UA: ABNORMAL
SL AMB POCT CLARITY,UA: CLEAR
SL AMB POCT COLOR,UA: YELLOW
SL AMB POCT KETONES,UA: ABNORMAL
SL AMB POCT NITRITE,UA: ABNORMAL
SL AMB POCT PH,UA: 6
SL AMB POCT SPECIFIC GRAVITY,UA: 1.01
SL AMB POCT URINE PROTEIN: ABNORMAL
SL AMB POCT UROBILINOGEN: 0.2

## 2022-06-30 PROCEDURE — 87186 SC STD MICRODIL/AGAR DIL: CPT | Performed by: NURSE PRACTITIONER

## 2022-06-30 PROCEDURE — 81002 URINALYSIS NONAUTO W/O SCOPE: CPT | Performed by: NURSE PRACTITIONER

## 2022-06-30 PROCEDURE — 87077 CULTURE AEROBIC IDENTIFY: CPT | Performed by: NURSE PRACTITIONER

## 2022-06-30 PROCEDURE — 87086 URINE CULTURE/COLONY COUNT: CPT | Performed by: NURSE PRACTITIONER

## 2022-06-30 PROCEDURE — 99214 OFFICE O/P EST MOD 30 MIN: CPT | Performed by: NURSE PRACTITIONER

## 2022-06-30 RX ORDER — NITROFURANTOIN 25; 75 MG/1; MG/1
100 CAPSULE ORAL 2 TIMES DAILY
Qty: 10 CAPSULE | Refills: 0 | Status: SHIPPED | OUTPATIENT
Start: 2022-06-30 | End: 2022-07-05

## 2022-06-30 NOTE — PROGRESS NOTES
Assessment/Plan:     Diagnoses and all orders for this visit:    Acute cystitis with hematuria  -     POCT urine dip  -     nitrofurantoin (MACROBID) 100 mg capsule; Take 1 capsule (100 mg total) by mouth 2 (two) times a day for 5 days  -     Urine culture      Discussed with patient plan to treat with nitrofurantoin for 5 days  Patient instructed to call if no improvement in 72 hours or symptoms worsen    Subjective:      Patient ID: Cristóbal Leong is a 42 y o  female  42 y  o female presenting with urinary urgency and some dysuria for the past day  She has been drinking cranberry juice which has helped with the pain  Due to the upcoming holiday weekend, she wanted to be checked out  A urine dip was performed which was positive for leukocytes and blood         Family History   Problem Relation Age of Onset    Breast cancer Mother 36    Stroke Father     Diabetes Father     Diabetes type II Father     No Known Problems Sister     No Known Problems Son     Ovarian cancer Maternal Aunt     No Known Problems Maternal Aunt     No Known Problems Maternal Aunt     No Known Problems Maternal Aunt     No Known Problems Maternal Aunt     No Known Problems Maternal Aunt     No Known Problems Paternal Aunt     No Known Problems Paternal Aunt      Social History     Socioeconomic History    Marital status: Single     Spouse name: Not on file    Number of children: 2    Years of education: Not on file    Highest education level: Not on file   Occupational History    Occupation: Therapist   Tobacco Use    Smoking status: Never Smoker    Smokeless tobacco: Never Used   Substance and Sexual Activity    Alcohol use: Yes     Comment: social; denied per allscripts    Drug use: No    Sexual activity: Not on file   Other Topics Concern    Not on file   Social History Narrative    Lives with spouse and 2 children     Social Determinants of Health     Financial Resource Strain: Not on file   Food Insecurity: Not on file   Transportation Needs: Not on file   Physical Activity: Not on file   Stress: Not on file   Social Connections: Not on file   Intimate Partner Violence: Not on file   Housing Stability: Not on file     E-Cigarette/Vaping     E-Cigarette/Vaping Substances     History reviewed  No pertinent past medical history  Past Surgical History:   Procedure Laterality Date    VAGINAL DELIVERY       Allergies   Allergen Reactions    Pollen Extract        Current Outpatient Medications:     nitrofurantoin (MACROBID) 100 mg capsule, Take 1 capsule (100 mg total) by mouth 2 (two) times a day for 5 days, Disp: 10 capsule, Rfl: 0    Review of Systems   Constitutional: Negative  Respiratory: Negative  Cardiovascular: Negative  Gastrointestinal: Negative  Genitourinary: Positive for dysuria and urgency  Musculoskeletal: Negative  Neurological: Negative  Psychiatric/Behavioral: Negative  Objective:    /84 (BP Location: Left arm, Patient Position: Sitting, Cuff Size: Standard)   Pulse 72   Temp (!) 96 8 °F (36 °C)   Ht 6' (1 829 m)   Wt 119 kg (262 lb)   SpO2 98%   BMI 35 53 kg/m² (Reviewed)     Physical Exam  Vitals reviewed  Constitutional:       General: She is not in acute distress  Appearance: She is not ill-appearing  HENT:      Head: Normocephalic and atraumatic  Right Ear: External ear normal       Left Ear: External ear normal    Eyes:      Extraocular Movements: Extraocular movements intact  Conjunctiva/sclera: Conjunctivae normal       Pupils: Pupils are equal, round, and reactive to light  Cardiovascular:      Rate and Rhythm: Normal rate and regular rhythm  Heart sounds: Normal heart sounds  Pulmonary:      Effort: Pulmonary effort is normal       Breath sounds: Normal breath sounds  Abdominal:      General: Abdomen is flat  Palpations: Abdomen is soft  Tenderness: There is no abdominal tenderness   There is no right CVA tenderness or left CVA tenderness  Musculoskeletal:      Cervical back: Neck supple  Skin:     General: Skin is warm and dry  Capillary Refill: Capillary refill takes less than 2 seconds  Neurological:      General: No focal deficit present  Mental Status: She is alert and oriented to person, place, and time     Psychiatric:         Mood and Affect: Mood normal          Behavior: Behavior normal

## 2022-07-02 LAB — BACTERIA UR CULT: ABNORMAL

## 2022-09-26 ENCOUNTER — TELEMEDICINE (OUTPATIENT)
Dept: FAMILY MEDICINE CLINIC | Facility: CLINIC | Age: 42
End: 2022-09-26
Payer: COMMERCIAL

## 2022-09-26 DIAGNOSIS — B34.9 VIRAL INFECTION, UNSPECIFIED: Primary | ICD-10-CM

## 2022-09-26 PROCEDURE — 99213 OFFICE O/P EST LOW 20 MIN: CPT | Performed by: FAMILY MEDICINE

## 2022-09-26 RX ORDER — BENZONATATE 100 MG/1
100 CAPSULE ORAL 3 TIMES DAILY PRN
Qty: 20 CAPSULE | Refills: 0 | Status: SHIPPED | OUTPATIENT
Start: 2022-09-26 | End: 2022-10-12 | Stop reason: ALTCHOICE

## 2022-09-26 RX ORDER — FLUTICASONE PROPIONATE 50 MCG
2 SPRAY, SUSPENSION (ML) NASAL DAILY
Qty: 9.9 G | Refills: 0 | Status: SHIPPED | OUTPATIENT
Start: 2022-09-26 | End: 2022-10-12 | Stop reason: ALTCHOICE

## 2022-09-26 NOTE — PROGRESS NOTES
COVID-19 Outpatient Progress Note    Assessment/Plan:    Problem List Items Addressed This Visit    None     Visit Diagnoses     Viral infection, unspecified    -  Primary    Relevant Medications    benzonatate (TESSALON PERLES) 100 mg capsule    fluticasone (FLONASE) 50 mcg/act nasal spray         Disposition:     After clarifying the patient's history, my suspicion for COVID-19 infection is very low  Discussed symptom directed medication options with patient  I have spent 15 minutes directly with the patient  Greater than 50% of this time was spent in counseling/coordination of care regarding: prognosis, risks and benefits of treatment options and instructions for management  Start tessalon perles and Flonase for URI  Covid negative  Continue monitoring symptoms at home  Follow up as needed  Encounter provider: Gaby Huerta MD     Provider located at: [place] [id number]  [phone]     Recent Visits  No visits were found meeting these conditions  Showing recent visits within past 7 days and meeting all other requirements  Today's Visits  Date Type Provider Dept   09/26/22 Telemedicine MD [facility]   Showing today's visits and meeting all other requirements  Future Appointments  No visits were found meeting these conditions  Showing future appointments within next 150 days and meeting all other requirements     This virtual check-in was done via [employer] and patient was informed that this is a secure, HIPAA-compliant platform  She agrees to proceed  Patient agrees to participate in a virtual check in via telephone or video visit instead of presenting to the office to address urgent/immediate medical needs  Patient is aware this is a billable service  She acknowledged consent and understanding of privacy and security of the video platform   The patient has agreed to participate and understands they can discontinue the visit at any time  After connecting through [facility], the patient was identified by name and date of birth  Liang Cabrales was informed that this was a telemedicine visit and that the exam was being conducted confidentially over secure lines  Liang Cabrales acknowledged consent and understanding of privacy and security of the telemedicine visit  I informed the patient that I have reviewed her record in Epic and presented the opportunity for her to ask any questions regarding the visit today  The patient agreed to participate  Verification of patient location:  Patient is located in the following state in which I hold an active license: [place]    Subjective:   Liang Cabrales is a 42 y o  female who is concerned about COVID-19  Patient's symptoms include nasal congestion, rhinorrhea, sore throat and cough  Patient denies fever, chills, fatigue, malaise, anosmia, loss of taste, shortness of breath and headaches       - Date of symptom onset: 9/26/2022      COVID-19 vaccination status: Fully vaccinated (primary series)    Exposure:   Contact with a person who is under investigation (PUI) for or who is positive for COVID-19 within the last 14 days?: No    Hospitalized recently for fever and/or lower respiratory symptoms?: No      Currently a healthcare worker that is involved in direct patient care?: No      Works in a special setting where the risk of COVID-19 transmission may be high? (this may include long-term care, correctional and residential facilities; homeless shelters; assisted-living facilities and group homes ): No      Resident in a special setting where the risk of COVID-19 transmission may be high? (this may include long-term care, correctional and residential facilities; homeless shelters; assisted-living facilities and group homes ): No      Lab Results   Component Value Date    SARSCOV2 Positive (A) 01/04/2022    Joana Bullock Not Detected 11/12/2020       Review of Systems Constitutional: Negative for chills, fatigue and fever  HENT: Positive for congestion, rhinorrhea and sore throat  Respiratory: Positive for cough  Negative for shortness of breath  Neurological: Negative for headaches  No current outpatient medications on file prior to visit  Objective: There were no vitals taken for this visit  Physical Exam  Constitutional:       General: She is not in acute distress  Appearance: Normal appearance  HENT:      Head: Normocephalic and atraumatic  Pulmonary:      Effort: No respiratory distress  Neurological:      General: No focal deficit present  Mental Status: She is alert     Psychiatric:         Mood and Affect: Mood normal        Cristobal Phillips MD

## 2022-09-28 ENCOUNTER — TELEPHONE (OUTPATIENT)
Dept: FAMILY MEDICINE CLINIC | Facility: CLINIC | Age: 42
End: 2022-09-28

## 2022-09-28 NOTE — TELEPHONE ENCOUNTER
Patient was seen on Monday with viral infection on 9/26/2022    She said if she was not getting any better that a antibiotic would be called into pharmacy          She did not go to work today    She also will need a work excuse for her to go back to work on 9/29/2022 if possible

## 2022-10-12 ENCOUNTER — OFFICE VISIT (OUTPATIENT)
Dept: FAMILY MEDICINE CLINIC | Facility: CLINIC | Age: 42
End: 2022-10-12
Payer: COMMERCIAL

## 2022-10-12 VITALS
BODY MASS INDEX: 34.81 KG/M2 | DIASTOLIC BLOOD PRESSURE: 60 MMHG | OXYGEN SATURATION: 99 % | HEART RATE: 70 BPM | SYSTOLIC BLOOD PRESSURE: 102 MMHG | WEIGHT: 257 LBS | HEIGHT: 72 IN | TEMPERATURE: 96.9 F | RESPIRATION RATE: 16 BRPM

## 2022-10-12 DIAGNOSIS — M25.462 SWELLING OF JOINT, KNEE, LEFT: Primary | ICD-10-CM

## 2022-10-12 PROCEDURE — 99213 OFFICE O/P EST LOW 20 MIN: CPT | Performed by: FAMILY MEDICINE

## 2022-10-12 NOTE — PROGRESS NOTES
Name: Ceferino Talley      : 1980      MRN: [id number]  Encounter Provider: Rashaad Story MD  Encounter Date: 10/12/2022   Encounter department: [address] [place] [address] St     1  Swelling of joint, knee, left    Nontender with good range of motion  Has not tried any medications at this time  Recommend ibuprofen 600 mg t i d  for 5 days  Patient should be elevating and icing her knee daily  Ace bandage provided for compression  If no improvement in the next week we will tap her left knee  Follow-up in 1 week       Subjective      Left knee swelling and tightness  Denies any pain  Denies any recent trauma  Review of Systems   Musculoskeletal:        Left knee swelling        Current Outpatient Medications on File Prior to Visit   Medication Sig   • [DISCONTINUED] benzonatate (TESSALON PERLES) 100 mg capsule Take 1 capsule (100 mg total) by mouth 3 (three) times a day as needed for cough   • [DISCONTINUED] fluticasone (FLONASE) 50 mcg/act nasal spray 2 sprays into each nostril daily       Objective     /60   Pulse 70   Temp (!) 96 9 °F (36 1 °C)   Resp 16   Ht 6' (1 829 m)   Wt 117 kg (257 lb)   LMP 2022 (Exact Date)   SpO2 99%   Breastfeeding No   BMI 34 86 kg/m²     Physical Exam  Musculoskeletal:         General: Swelling present  No tenderness  Right knee: No swelling, effusion, erythema or crepitus  Normal range of motion  Left knee: Swelling, effusion and crepitus present  No erythema or bony tenderness  Decreased range of motion  No tenderness         Rashaad Story MD

## 2022-10-18 ENCOUNTER — TELEPHONE (OUTPATIENT)
Dept: FAMILY MEDICINE CLINIC | Facility: CLINIC | Age: 42
End: 2022-10-18

## 2022-10-18 NOTE — TELEPHONE ENCOUNTER
Pt called stating she has an appt tomorrow for a recheck of her knee with Dr Garrett Mar   Pt is asking if a cat scan of her knee could be ordered instead and do a follow-up in the next week or 2 to go over findings? Pt states the swelling has gone down but she still can't bend it

## 2022-10-19 ENCOUNTER — HOSPITAL ENCOUNTER (OUTPATIENT)
Dept: RADIOLOGY | Facility: HOSPITAL | Age: 42
Discharge: HOME | End: 2022-10-19
Attending: FAMILY MEDICINE
Payer: COMMERCIAL

## 2022-10-19 ENCOUNTER — OFFICE VISIT (OUTPATIENT)
Dept: FAMILY MEDICINE CLINIC | Facility: CLINIC | Age: 42
End: 2022-10-19
Payer: COMMERCIAL

## 2022-10-19 VITALS
SYSTOLIC BLOOD PRESSURE: 118 MMHG | BODY MASS INDEX: 34.61 KG/M2 | OXYGEN SATURATION: 100 % | RESPIRATION RATE: 17 BRPM | HEIGHT: 72 IN | TEMPERATURE: 97.1 F | DIASTOLIC BLOOD PRESSURE: 82 MMHG | HEART RATE: 61 BPM | WEIGHT: 255.5 LBS

## 2022-10-19 DIAGNOSIS — M25.562 ACUTE PAIN OF LEFT KNEE: ICD-10-CM

## 2022-10-19 DIAGNOSIS — M25.562 ACUTE PAIN OF LEFT KNEE: Primary | ICD-10-CM

## 2022-10-19 PROCEDURE — 99213 OFFICE O/P EST LOW 20 MIN: CPT | Performed by: FAMILY MEDICINE

## 2022-10-19 PROCEDURE — 73564 X-RAY EXAM KNEE 4 OR MORE: CPT

## 2022-10-19 NOTE — PATIENT INSTRUCTIONS
Knee Exercises   WHAT YOU NEED TO KNOW:   What do I need to know about knee exercises? Knee exercises help strengthen the muscles around your knee  Strong muscles can help reduce pain and decrease your risk of future injury  Knee exercises also help you heal after an injury or surgery  · Start slow  These are beginning exercises  Ask your healthcare provider if you need to see a physical therapist for more advanced exercises  As you get stronger, you may be able to do more sets of each exercise or add weights  · Stop if you feel pain  It is normal to feel some discomfort at first  Regular exercise will help decrease your discomfort over time  · Do the exercises on both legs  Do this so both knees remain strong  · Warm up before you do knee exercises  Walk or ride a stationary bike for 5 or 10 minutes to warm your muscles  How do I perform knee stretches safely? Always stretch before you do strengthening exercises  Do these stretching exercises again after you do the strengthening exercises  Do these stretches 4 or 5 days a week, or as directed  · Standing calf stretch: Face a wall and place both palms flat on the wall, or hold the back of a chair for balance  Keep a slight bend in your knees  Take a big step backward with one leg  Keep your other leg directly under you  Keep both heels flat and press your hips forward  Hold the stretch for 30 seconds, and then relax for 30 seconds  Switch legs  Repeat 2 or 3 times on each leg  · Standing quadriceps stretch:  Stand and place one hand against a wall or hold the back of a chair for balance  With your weight on one leg, bend your other leg and grab your ankle  Bring your heel toward your buttocks  Hold the stretch for 30 to 60 seconds  Switch legs  Repeat 2 or 3 times on each leg  · Sitting hamstring stretch:  Sit with both legs straight in front of you  Do not point or flex your toes   Place your palms on the floor and slide your hands forward until you feel the stretch  Do not round your back  Hold the stretch for 30 seconds  Repeat 2 or 3 times  How do I perform knee strengthening exercises safely? Do these exercises 4 or 5 days a week, or as directed  · Standing half squats:  Stand with your feet shoulder-width apart  Lean your back against a wall or hold the back of a chair for balance, if needed  Slowly sit down about 10 inches, as if you are going to sit in a chair  Your body weight should be mostly over your heels  Hold the squat for 5 seconds, then rise to a standing position  Do 3 sets of 10 squats to strengthen your buttocks and thighs  · Standing hamstring curls: Face a wall and place both palms flat on the wall, or hold the back of a chair for balance  With your weight on one leg, lift your other foot as close to your buttocks as you can  Hold for 5 seconds and then lower your leg  Do 2 sets of 10 curls on each leg  This exercise strengthens the muscles in the back of your thigh  · Standing calf raises:  Face a wall and place both palms flat on the wall, or hold the back of a chair for balance  Stand up straight, and do not lean  Place all your weight on one leg by lifting the other foot off the floor  Raise the heel of the foot that is on the floor as high as you can and then lower it  Do 2 sets of 10 calf raises on each leg to strengthen your calf muscles  · Straight leg lifts:  Lie on your stomach with straight legs  Fold your arms in front of you and rest your head in your arms  Tighten your leg muscles and raise one leg as high as you can  Hold for 5 seconds, then lower your leg  Do 2 sets of 10 lifts on each leg to strengthen your buttocks  · Sitting leg lifts:  Sit in a chair  Slowly straighten and raise one leg  Squeeze your thigh muscles and hold for 5 seconds  Relax and return your foot to the floor  Do 2 sets of 10 lifts on each leg   This helps strengthen the muscles in the front of your thigh  When should I contact my healthcare provider? · You have new pain or your pain becomes worse  · You have questions or concerns about your condition or care  CARE AGREEMENT:   You have the right to help plan your care  Learn about your health condition and how it may be treated  Discuss treatment options with your healthcare providers to decide what care you want to receive  You always have the right to refuse treatment  The above information is an  only  It is not intended as medical advice for individual conditions or treatments  Talk to your doctor, nurse or pharmacist before following any medical regimen to see if it is safe and effective for you  © Copyright [employer] 2022 Information is for End User's use only and may not be sold, redistributed or otherwise used for commercial purposes  All illustrations and images included in CareNotes® are the copyrighted property of A D A M , Inc  or [facility]  Patellofemoral Pain Syndrome   WHAT YOU NEED TO KNOW:   What is patellofemoral pain syndrome (PFPS)? PFPS is pain in or around your patella (kneecap)  PFPS is also called runner's knee or jumper's knee and is common in athletes  PFPS can develop when the patella rubs against the femur (thigh bone) as you move your knee  It may also happen when the patella moves out of place  PFPS may be caused by chondromalacia  This is a softening and breakdown of the cartilage under your kneecap  The kneecap is a natural shock absorber  Trauma, previous injury to your knee, excessive use, or activities such as running can cause your symptoms  What increases my risk for PFPS? · Increased activity, or overactivity    · A hip, knee, and ankle that are not lined up correctly    · Leg muscles that are not balanced or are weak    · Fallen or high arches    · Obesity    What are the signs and symptoms of PFPS?    · Popping or crackling sounds when you move your knee    · Pain when you go up or down the stairs, squat, run, or ride a bike    · Pain after you sit for a long time with your knees bent    · Swelling, stiffness, or weakness in your knee    How is PFPS diagnosed? Your healthcare provider will examine your knee and ask about your symptoms  He or she may move your legs in different directions to check for pain, and to see how your kneecap moves  You may also need the following:  · An x-ray, CT, or MRI  may show damage to your knee and the tissues that surround it  You may be given contrast liquid to help your knee show up better in the pictures  Tell the healthcare provider if you have ever had an allergic reaction to contrast liquid  Do not enter the MRI room with anything metal  Metal can cause serious injury  Tell the healthcare provider if you have any metal in or on your body  · Arthroscopy  may be used to look inside your knee  Healthcare providers use a scope (thin, bendable tube with a light and camera on the end) to examine your knee  How can I manage my symptoms? · Change your activity  You may need to rest your knee  You may need to change your exercise routine to low-impact activities  · Apply ice  on your knee for 15 to 20 minutes every hour or as directed  Use an ice pack, or put crushed ice in a plastic bag  Cover it with a towel before you apply it  Ice helps prevent tissue damage and decreases swelling and pain  · Elevate  your knee above the level of your heart as often as you can  This will help decrease swelling and pain  Prop your leg on pillows or blankets to keep it elevated comfortably  Do not  put a pillow directly under your knee  · Support  your knee by wrapping it with tape or an elastic bandage  You may need a brace for more support  This will help decrease swelling and keep your kneecap in the correct spot  · Wear shoe inserts as directed    Orthotics or arch supports help keep your foot and ankle stable and in line to decrease stress on your knee  · Go to physical therapy as directed  A physical therapist will teach you exercises to help improve movement and strength, and to decrease pain  · Maintain a healthy weight  Ask your healthcare provider what a healthy weight is for you  Ask him or her to help you create a weight loss plan if you are overweight  Weight loss can help decrease pressure on your knee  How is PFPS treated? · Acetaminophen  decreases pain and fever  It is available without a doctor's order  Ask how much to take and how often to take it  Follow directions  Read the labels of all other medicines you are using to see if they also contain acetaminophen, or ask your doctor or pharmacist  Acetaminophen can cause liver damage if not taken correctly  Do not use more than 4 grams (4,000 milligrams) total of acetaminophen in one day  · NSAIDs , such as ibuprofen, help decrease swelling, pain, and fever  This medicine is available with or without a doctor's order  NSAIDs can cause stomach bleeding or kidney problems in certain people  If you take blood thinner medicine, always ask your healthcare provider if NSAIDs are safe for you  Always read the medicine label and follow directions  · Surgery  may be needed if other treatments do not work  During surgery, the back of your kneecap is smoothed  A ligament may also be cut to allow the knee to return to its normal position  Surgery may be done through small incisions as during arthroscopy, or you may need a larger incision  A cut in your shin bone may also be needed to help line up your kneecap correctly  How can I prevent another episode? · Wear the right shoes for your activities  Increase activity gradually  · Warm up before you exercise  Stretch your leg muscles before and after activity  When should I call my doctor? · You have trouble walking  · You have a fever  · Your knee brace or sleeve is too tight      · Your symptoms are not getting better, or they get worse  · Your pain and swelling increase even after you take pain medicine  · You have questions or concerns about your condition or care  CARE AGREEMENT:   You have the right to help plan your care  Learn about your health condition and how it may be treated  Discuss treatment options with your healthcare providers to decide what care you want to receive  You always have the right to refuse treatment  The above information is an  only  It is not intended as medical advice for individual conditions or treatments  Talk to your doctor, nurse or pharmacist before following any medical regimen to see if it is safe and effective for you  © Copyright [employer] 2022 Information is for End User's use only and may not be sold, redistributed or otherwise used for commercial purposes  All illustrations and images included in CareNotes® are the copyrighted property of [employer]  or Liya Walters    Weight Management   AMBULATORY CARE:   Why it is important to manage your weight:  Being overweight increases your risk of health conditions such as heart disease, high blood pressure, type 2 diabetes, and certain types of cancer  It can also increase your risk for osteoarthritis, sleep apnea, and other respiratory problems  Aim for a slow, steady weight loss  Even a small amount of weight loss can lower your risk of health problems  Risks of being overweight:  Extra weight can cause many health problems, including the following:  · Diabetes (high blood sugar level)    · High blood pressure or high cholesterol    · Heart disease    · Stroke    · Gallbladder or liver disease    · Cancer of the colon, breast, prostate, liver, or kidney    · Sleep apnea    · Arthritis or gout    Screening  is done to check for health conditions before you have signs or symptoms   If you are 28to 79years old, your blood sugar level may be checked every 3 years for signs of prediabetes or diabetes  Your healthcare provider will check your blood pressure at each visit  High blood pressure can lead to a stroke or other problems  Your provider may check for signs of heart disease, cancer, or other health problems  How to lose weight safely:  A safe and healthy way to lose weight is to eat fewer calories and get regular exercise  · You can lose up about 1 pound a week by decreasing the number of calories you eat by 500 calories each day  You can decrease calories by eating smaller portion sizes or by cutting out high-calorie foods  Read labels to find out how many calories are in the foods you eat  · You can also burn calories with exercise such as walking, swimming, or biking  You will be more likely to keep weight off if you make these changes part of your lifestyle  Exercise at least 30 minutes per day on most days of the week  You can also fit in more physical activity by taking the stairs instead of the elevator or parking farther away from stores  Ask your healthcare provider about the best exercise plan for you  Healthy meal plan for weight management:  A healthy meal plan includes a variety of foods, contains fewer calories, and helps you stay healthy  A healthy meal plan includes the following:     · Eat whole-grain foods more often  A healthy meal plan should contain fiber  Fiber is the part of grains, fruits, and vegetables that is not broken down by your body  Whole-grain foods are healthy and provide extra fiber in your diet  Some examples of whole-grain foods are whole-wheat breads and pastas, oatmeal, brown rice, and bulgur  · Eat a variety of vegetables every day  Include dark, leafy greens such as spinach, kale, tarun greens, and mustard greens  Eat yellow and orange vegetables such as carrots, sweet potatoes, and winter squash  · Eat a variety of fruits every day    Choose fresh or canned fruit (canned in its own juice or light syrup) instead of juice  Fruit juice has very little or no fiber  · Eat low-fat dairy foods  Drink fat-free (skim) milk or 1% milk  Eat fat-free yogurt and low-fat cottage cheese  Try low-fat cheeses such as mozzarella and other reduced-fat cheeses  · Choose meat and other protein foods that are low in fat  Choose beans or other legumes such as split peas or lentils  Choose fish, skinless poultry (chicken or turkey), or lean cuts of red meat (beef or pork)  Before you cook meat or poultry, cut off any visible fat  · Use less fat and oil  Try baking foods instead of frying them  Add less fat, such as margarine, sour cream, regular salad dressing and mayonnaise to foods  Eat fewer high-fat foods  Some examples of high-fat foods include french fries, doughnuts, ice cream, and cakes  · Eat fewer sweets  Limit foods and drinks that are high in sugar  This includes candy, cookies, regular soda, and sweetened drinks  Ways to decrease calories:   · Eat smaller portions  ? Use a small plate with smaller servings  ? Do not eat second helpings  ? When you eat at a restaurant, ask for a box and place half of your meal in the box before you eat  ? Share an entrée with someone else  · Replace high-calorie snacks with healthy, low-calorie snacks  ? Choose fresh fruit, vegetables, fat-free rice cakes, or air-popped popcorn instead of potato chips, nuts, or chocolate  ? Choose water or calorie-free drinks instead of soda or sweetened drinks  · Do not shop for groceries when you are hungry  You may be more likely to make unhealthy food choices  Take a grocery list of healthy foods and shop after you have eaten  · Eat regular meals  Do not skip meals  Skipping meals can lead to overeating later in the day  This can make it harder for you to lose weight   Eat a healthy snack in place of a meal if you do not have time to eat a regular meal  Talk with a dietitian to help you create a meal plan and schedule that is right for you  Other things to consider as you try to lose weight:   · Be aware of situations that may give you the urge to overeat, such as eating while watching television  Find ways to avoid these situations  For example, read a book, go for a walk, or do crafts  · Meet with a weight loss support group or friends who are also trying to lose weight  This may help you stay motivated to continue working on your weight loss goals  © Copyright [employer] 2022 Information is for End User's use only and may not be sold, redistributed or otherwise used for commercial purposes  All illustrations and images included in CareNotes® are the copyrighted property of A D A M , Inc  or [facility] Josiah Presley   The above information is an  only  It is not intended as medical advice for individual conditions or treatments  Talk to your doctor, nurse or pharmacist before following any medical regimen to see if it is safe and effective for you

## 2022-10-19 NOTE — PROGRESS NOTES
Name: Katelyn Bardales      : 1980      MRN: [id number]  Encounter Provider: Rustam Alfonso MD  Encounter Date: 10/19/2022   Encounter department: [facility]     1  Acute pain of left knee  -     XR knee 4+ vw left injury; Future; Expected date: 10/19/2022    2  BMI 34 0-34 9,adult  -     Ambulatory referral to Weight Management; Future    Likely patellofemoral pain syndrome  Will obtain x-ray due to continue swelling  Offered patient drainage and steroid injection today but she would like to take more conservative measures at this time  Continue wearing compression sleeve as needed along with icing daily  Can take ibuprofen for pain and swelling  Subjective      Follow-up for left knee swelling  Has improved since last week but some swelling still remains  Denies any pain at this time  Complains more of a tightness  She has been using compression and ice  Review of Systems   Musculoskeletal:        Left knee swelling  Nontender       No current outpatient medications on file prior to visit  Objective     /82   Pulse 61   Temp (!) 97 1 °F (36 2 °C)   Resp 17   Ht 6' (1 829 m)   Wt 116 kg (255 lb 8 oz)   SpO2 100%   BMI 34 65 kg/m²     Physical Exam  Vitals and nursing note reviewed  Constitutional:       Appearance: She is obese  Musculoskeletal:         General: Swelling (Left knee) present  No tenderness  Normal range of motion  Neurological:      General: No focal deficit present  Mental Status: She is alert and oriented to person, place, and time  Psychiatric:         Mood and Affect: Mood normal          Behavior: Behavior normal        Rustam Alfonso MD  BMI Counseling: Body mass index is 34 65 kg/m²  The BMI is above normal  Nutrition recommendations include reducing portion sizes and 3-5 servings of fruits/vegetables daily  Exercise recommendations include exercising 3-5 times per week   Patient referred to weight management due to patient being obese

## 2022-11-22 NOTE — PROGRESS NOTES
Weight Management Medical Nutrition Assessment  Smita Bourgeois is here today for 1/12 employee menu planning  Current weight 252 2#  Per dietary recall, patient consumes few calories in AM, or low-protein/high-carbohydrate breakfast/lunch resulting in rebound hunger in PM with high calorie, large portion meals and grazing  Discussed benefits of interval eating, adequate protein intake, and sources of lean protein  Discussed lifestyle components that effect metabolism  Patient asked about several fat burning products  Explained to patient that I have not personally seen any evidence based research that supports the claims on these products  Discussed sources of fiber  Encouraged patient to utilize in-home elliptical or gym membership with a goal of 4-5x/week + 2 days of strength training  Encouraged patient to focus on balanced meals with consistent physical activity  Patient will call for RD f/u  RD card provided       Likes: [place] yogurt, cheese, nuts, peanut butter, tuna, eggs     Patient seen by Medical Provider in past 6 months:  no  Requested to schedule appointment with Medical Provider: No    Anthropometric Measurements  Start Weight (#): 252 2# (11/23/22)  Current Weight (#): n/a  TBW % Change from start weight: n/a  Ideal Body Weight (#): 160# (72")  Goal Weight (#): "lose 50#"    Weight Loss History  Previous weight loss attempts: Commercial Programs (e[employer]ames/[employer], Rina Maldonado, etc )  FAD Diets (Cabbage soup, Grapefruit, Cleanse, etc )  Self Created Diets (Portion Control, Healthy Food Choices, etc )  Exercise  Fasting     Food and Nutrition Related History  Wake up: 5 am   Bed Time: 8/9 pm  Sleep is "so so"     Dietary Recall  Breakfast (6/7 am): -- if fasting Or toast Or bagel Or oatmeal Or banana    Snack: --  Lunch (11 am): -- Or protein (chicken/fish/beef) + rice Or vegetable soup Or nutrigrain bar Or leftovers  Snack: chips Or grapes Or grazing   Dinner: chicken nuggets + broccoli Or tacos Or chicken/turkey burger + chips Or grilled cheese + ramen   Snack: cookies Or ice cream     Beverages: water, V8 energy drink, cranberry juice (100% juice; 8 oz daily), soda (occasionally), and alcohol (rarely)   Volume of beverage intake: 40 ounces     Weekends: Will have 3 meals + snacks (more structure but larger meals)   Cravings: not usually but if having cravings, will crave sweets   Trouble area of day: between lunch and dinner     Frequency of Eating out: 2x/week  Food restrictions: none   Cooking: self  Food Shopping: self     Occupation: Psychotherapist for [employer]     Physical Activity  Activity: No structured routine [has elliptical at home and has gym membership]  Frequency:rarely  Physical limitations/barriers to exercise: knee pain and reduced shoulder mobility     Estimated Needs  [employer] Needs (needs at 255 5#)  BMR: 1,926 kcal  Maintenance calories (sedentary): 2,311 kcal  1-2# loss weekly sedentary: 1,311-1,811 kcal  1-2# loss weekly lightly active: 3,660-8,191 kcal    Protein:  grams (1 2-1 5g/kg IBW)  Fluid: 85 ounces (35mL/kg IBW)    Nutrition Diagnosis  Yes; Overweight/obesity related to Excess energy intake as evidenced by BMI more than normative standard for age and sex (obesity-grade I 26-30  9)     Nutrition Intervention    Nutrition Prescription  Calories: 1,400-1,800 kcal  Protein: 110 g  Fluid: 85+ ounces    Meal Plan (Ashish/Pro)  Breakfast: 200-350/20-25  Snack: 150-200/5+  Lunch: 400-450/30  Snack: 100-150/5+  Dinner: 400-500/  Snack:    Nutrition Education  Calorie controlled menu  Lean protein food choices  Healthy snack options  Food journaling tips    Nutrition Counseling  Strategies: meal planning, portion sizes, healthy snack choices, hydration, fiber intake, protein intake, exercise, food logging    Monitoring and Evaluation:    Evaluation criteria  Energy Intake  Meet protein needs  Maintain adequate hydration  Monitor weekly weight  Meal planning/preparation  Food journal   Decreased portions at mealtimes and snacks  Physical activity     Barriers to learning:none  Readiness to change: Contemplation:  (Acknowledging that there is a problem but not yet ready or sure of wanting to make a change) and Preparation:  (Getting ready to change)   Comprehension: good  Expected Compliance: good

## 2022-11-23 ENCOUNTER — OFFICE VISIT (OUTPATIENT)
Dept: BARIATRICS | Facility: CLINIC | Age: 42
End: 2022-11-23

## 2022-11-23 VITALS — BODY MASS INDEX: 34.16 KG/M2 | HEIGHT: 72 IN | WEIGHT: 252.2 LBS

## 2022-11-23 DIAGNOSIS — E66.09 CLASS 1 OBESITY DUE TO EXCESS CALORIES WITH BODY MASS INDEX (BMI) OF 34.0 TO 34.9 IN ADULT, UNSPECIFIED WHETHER SERIOUS COMORBIDITY PRESENT: Primary | ICD-10-CM

## 2022-11-29 ENCOUNTER — IMMUNIZATIONS (OUTPATIENT)
Dept: FAMILY MEDICINE CLINIC | Facility: CLINIC | Age: 42
End: 2022-11-29

## 2022-11-29 DIAGNOSIS — Z23 ENCOUNTER FOR IMMUNIZATION: Primary | ICD-10-CM

## 2023-02-16 ENCOUNTER — HOSPITAL ENCOUNTER (OUTPATIENT)
Dept: MAMMOGRAPHY | Facility: HOSPITAL | Age: 43
Discharge: HOME | End: 2023-02-16

## 2023-02-16 VITALS — BODY MASS INDEX: 34.04 KG/M2 | WEIGHT: 251.32 LBS | HEIGHT: 72 IN

## 2023-02-16 DIAGNOSIS — Z12.31 ENCOUNTER FOR SCREENING MAMMOGRAM FOR MALIGNANT NEOPLASM OF BREAST: ICD-10-CM

## 2023-10-16 ENCOUNTER — OFFICE VISIT (OUTPATIENT)
Dept: URGENT CARE | Age: 43
End: 2023-10-16
Payer: COMMERCIAL

## 2023-10-16 ENCOUNTER — APPOINTMENT (OUTPATIENT)
Dept: RADIOLOGY | Age: 43
End: 2023-10-16
Payer: COMMERCIAL

## 2023-10-16 VITALS
SYSTOLIC BLOOD PRESSURE: 108 MMHG | TEMPERATURE: 79.6 F | OXYGEN SATURATION: 97 % | HEART RATE: 64 BPM | RESPIRATION RATE: 20 BRPM | DIASTOLIC BLOOD PRESSURE: 82 MMHG

## 2023-10-16 DIAGNOSIS — R29.91: Primary | ICD-10-CM

## 2023-10-16 DIAGNOSIS — R29.91: ICD-10-CM

## 2023-10-16 PROCEDURE — 99213 OFFICE O/P EST LOW 20 MIN: CPT | Performed by: NURSE PRACTITIONER

## 2023-10-16 PROCEDURE — 72050 X-RAY EXAM NECK SPINE 4/5VWS: CPT

## 2023-10-16 NOTE — PROGRESS NOTES
[facility]        NAME: Jason Wilson is a 36 y.o. female  : 1980    MRN: [id number]  DATE: 2023  TIME: 5:11 PM    Assessment and Plan   Muscle symptom [R29.91]  1. Muscle symptom  XR spine cervical complete 4 or 5 vw non injury            Patient Instructions     Normal x-rays  Follow up with PCP in ASAP  Proceed to  ER if symptoms worsen. Chief Complaint     Chief Complaint   Patient presents with    Neck Pain     Patient states she having neck pain/ pressure left side for 1 week. Having also headache in the passed 3 days. ( No injury ). History of Present Illness       HPI  Reports feeling of pressure that starts on the left side of her neck, radiates up toward her left ear and into the left cheek and forehead. Says it feels like when you cut a small tube and pressure builds behind it. Often will occur when she sits for extended periods. Says today while at work, she experienced it many times, even after sitting for about 15 mins. Duration of all symptoms, about 1 week. No previous occurrences. Review of Systems   Review of Systems   Constitutional:  Negative for fever. HENT:  Positive for dental problem (problem with crown of one tooth, but symptoms do not feel like tooth ache). Negative for ear discharge, ear pain, facial swelling, sore throat and trouble swallowing. Respiratory:  Negative for shortness of breath. Cardiovascular:  Negative for chest pain. Neurological:  Negative for light-headedness. Current Medications     No current outpatient medications on file. Current Allergies     Allergies as of 10/16/2023 - Reviewed 10/16/2023   Allergen Reaction Noted    Pollen extract  2015            The following portions of the patient's history were reviewed and updated as appropriate: allergies, current medications, past family history, past medical history, past social history, past surgical history and problem list.     History reviewed. No pertinent past medical history. Past Surgical History:   Procedure Laterality Date    VAGINAL DELIVERY         Family History   Problem Relation Age of Onset    Breast cancer Mother 36    Stroke Father     Diabetes Father     Diabetes type II Father     No Known Problems Sister     No Known Problems Son     Ovarian cancer Maternal Aunt     No Known Problems Maternal Aunt     No Known Problems Maternal Aunt     No Known Problems Maternal Aunt     No Known Problems Maternal Aunt     No Known Problems Maternal Aunt     No Known Problems Paternal Aunt     No Known Problems Paternal Aunt          Medications have been verified. Objective   /82   Pulse 64   Temp (!) 79.6 °F (26.4 °C) (Temporal)   Resp 20   SpO2 97%   No LMP recorded. Physical Exam     Physical Exam  Neck:      Vascular: No carotid bruit. Musculoskeletal:         General: No swelling, tenderness (with palpation of the muscles of the L side of the neck and with palpation of the trapezius muscles going down into the L shoulder) or deformity. Cervical back: Normal range of motion. No rigidity or tenderness. Comments: Normal ROM of the L upper extremity along the L shoulder, and asymptomatic   Lymphadenopathy:      Cervical: No cervical adenopathy. Skin:     Findings: No bruising or erythema.

## 2023-10-18 ENCOUNTER — OFFICE VISIT (OUTPATIENT)
Dept: FAMILY MEDICINE CLINIC | Facility: CLINIC | Age: 43
End: 2023-10-18
Payer: COMMERCIAL

## 2023-10-18 ENCOUNTER — APPOINTMENT (OUTPATIENT)
Dept: LAB | Facility: AMBULARY SURGERY CENTER | Age: 43
End: 2023-10-18
Payer: COMMERCIAL

## 2023-10-18 VITALS
WEIGHT: 248.5 LBS | OXYGEN SATURATION: 96 % | TEMPERATURE: 98.2 F | SYSTOLIC BLOOD PRESSURE: 118 MMHG | HEIGHT: 72 IN | BODY MASS INDEX: 33.66 KG/M2 | RESPIRATION RATE: 16 BRPM | HEART RATE: 79 BPM | DIASTOLIC BLOOD PRESSURE: 78 MMHG

## 2023-10-18 DIAGNOSIS — R51.9 CHRONIC NONINTRACTABLE HEADACHE, UNSPECIFIED HEADACHE TYPE: Primary | ICD-10-CM

## 2023-10-18 DIAGNOSIS — G89.29 CHRONIC NONINTRACTABLE HEADACHE, UNSPECIFIED HEADACHE TYPE: ICD-10-CM

## 2023-10-18 DIAGNOSIS — Z82.49 FAMILY HISTORY OF BRAIN ANEURYSM: ICD-10-CM

## 2023-10-18 DIAGNOSIS — R51.9 CHRONIC NONINTRACTABLE HEADACHE, UNSPECIFIED HEADACHE TYPE: ICD-10-CM

## 2023-10-18 DIAGNOSIS — G89.29 CHRONIC NONINTRACTABLE HEADACHE, UNSPECIFIED HEADACHE TYPE: Primary | ICD-10-CM

## 2023-10-18 LAB
ALBUMIN SERPL BCP-MCNC: 4.2 G/DL (ref 3.5–5)
ALP SERPL-CCNC: 75 U/L (ref 34–104)
ALT SERPL W P-5'-P-CCNC: 18 U/L (ref 7–52)
ANION GAP SERPL CALCULATED.3IONS-SCNC: 7 MMOL/L
AST SERPL W P-5'-P-CCNC: 16 U/L (ref 13–39)
BASOPHILS # BLD AUTO: 0.03 THOUSANDS/ÂΜL (ref 0–0.1)
BASOPHILS NFR BLD AUTO: 0 % (ref 0–1)
BILIRUB SERPL-MCNC: 0.48 MG/DL (ref 0.2–1)
BUN SERPL-MCNC: 12 MG/DL (ref 5–25)
CALCIUM SERPL-MCNC: 9.7 MG/DL (ref 8.4–10.2)
CHLORIDE SERPL-SCNC: 104 MMOL/L (ref 96–108)
CO2 SERPL-SCNC: 29 MMOL/L (ref 21–32)
CREAT SERPL-MCNC: 0.8 MG/DL (ref 0.6–1.3)
EOSINOPHIL # BLD AUTO: 0.09 THOUSAND/ÂΜL (ref 0–0.61)
EOSINOPHIL NFR BLD AUTO: 1 % (ref 0–6)
ERYTHROCYTE [DISTWIDTH] IN BLOOD BY AUTOMATED COUNT: 12.5 % (ref 11.6–15.1)
FERRITIN SERPL-MCNC: 124 NG/ML (ref 11–307)
FOLATE SERPL-MCNC: 20.7 NG/ML
GFR SERPL CREATININE-BSD FRML MDRD: 90 ML/MIN/1.73SQ M
GLUCOSE SERPL-MCNC: 72 MG/DL (ref 65–140)
HCT VFR BLD AUTO: 38.9 % (ref 34.8–46.1)
HGB BLD-MCNC: 13.6 G/DL (ref 11.5–15.4)
IMM GRANULOCYTES # BLD AUTO: 0.01 THOUSAND/UL (ref 0–0.2)
IMM GRANULOCYTES NFR BLD AUTO: 0 % (ref 0–2)
LYMPHOCYTES # BLD AUTO: 3.03 THOUSANDS/ÂΜL (ref 0.6–4.47)
LYMPHOCYTES NFR BLD AUTO: 37 % (ref 14–44)
MAGNESIUM SERPL-MCNC: 1.9 MG/DL (ref 1.9–2.7)
MCH RBC QN AUTO: 30.8 PG (ref 26.8–34.3)
MCHC RBC AUTO-ENTMCNC: 35 G/DL (ref 31.4–37.4)
MCV RBC AUTO: 88 FL (ref 82–98)
MONOCYTES # BLD AUTO: 0.71 THOUSAND/ÂΜL (ref 0.17–1.22)
MONOCYTES NFR BLD AUTO: 9 % (ref 4–12)
NEUTROPHILS # BLD AUTO: 4.37 THOUSANDS/ÂΜL (ref 1.85–7.62)
NEUTS SEG NFR BLD AUTO: 53 % (ref 43–75)
NRBC BLD AUTO-RTO: 0 /100 WBCS
PLATELET # BLD AUTO: 334 THOUSANDS/UL (ref 149–390)
PMV BLD AUTO: 9.2 FL (ref 8.9–12.7)
POTASSIUM SERPL-SCNC: 4.4 MMOL/L (ref 3.5–5.3)
PROT SERPL-MCNC: 7.1 G/DL (ref 6.4–8.4)
RBC # BLD AUTO: 4.41 MILLION/UL (ref 3.81–5.12)
SODIUM SERPL-SCNC: 140 MMOL/L (ref 135–147)
VIT B12 SERPL-MCNC: 694 PG/ML (ref 180–914)
WBC # BLD AUTO: 8.24 THOUSAND/UL (ref 4.31–10.16)

## 2023-10-18 PROCEDURE — 82746 ASSAY OF FOLIC ACID SERUM: CPT

## 2023-10-18 PROCEDURE — 83735 ASSAY OF MAGNESIUM: CPT

## 2023-10-18 PROCEDURE — 80053 COMPREHEN METABOLIC PANEL: CPT

## 2023-10-18 PROCEDURE — 82607 VITAMIN B-12: CPT

## 2023-10-18 PROCEDURE — 85025 COMPLETE CBC W/AUTO DIFF WBC: CPT

## 2023-10-18 PROCEDURE — 82728 ASSAY OF FERRITIN: CPT

## 2023-10-18 PROCEDURE — 36415 COLL VENOUS BLD VENIPUNCTURE: CPT

## 2023-10-18 PROCEDURE — 99214 OFFICE O/P EST MOD 30 MIN: CPT | Performed by: STUDENT IN AN ORGANIZED HEALTH CARE EDUCATION/TRAINING PROGRAM

## 2023-10-18 NOTE — PROGRESS NOTES
Name: Triny Schmitt      : 1980      MRN: [id number]  Encounter Provider: Girma Cutler MD  Encounter Date: 10/18/2023   Encounter department: [address] [place] [address]     1. Chronic nonintractable headache, unspecified headache type  Assessment & Plan: Onset 3 weeks ago, gradually worsening. Will investigate with lab work and imaging in the setting of family hx. Orders:  -     Comprehensive metabolic panel; Future  -     Magnesium; Future  -     Vitamin B12; Future  -     Folate; Future  -     CBC and differential; Future  -     Ferritin; Future  -     MRA head w wo contrast; Future; Expected date: 10/18/2023    2. Family history of brain aneurysm  Assessment & Plan:  Fam hx of ruptured brain aneurysm in mother. Patient had MRA wo contrast in 2018 as screening measure. Was not experiencing headaches at that time. Subjective     This is a very pleasant 36 y.o. female who presents to the clinic for headaches. Symptoms have been ongoing for about 3 weeks and progressively worsening. 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 She reports tenderness and tightness in her gluteal region after sitting for several minutes at which she will feel a pressure sensation in the left side of her neck, occipital head region and bilateral ear pressure. Reports it's harder for her to focus her vision when symptoms arise. Head pressure and discomfort rated a 9/10 in severity. Denies nausea, vomiting, bleeding, dizziness, light sensitivity, stiff neck, chest pain, shortness of breath. Reports history of brain aneurysm in mother and is concerned she might be experiencing symptoms related to this. Patient is requesting CT head scan. Reports she recently went to the dentist and provider noted no dental related reason for current symptoms. Review of Systems   Constitutional:  Negative for chills and fever.    HENT:  Negative for congestion, ear pain, rhinorrhea and sinus pain. Eyes:  Negative for visual disturbance. Respiratory:  Negative for chest tightness, shortness of breath and wheezing. Cardiovascular:  Negative for chest pain and palpitations. Gastrointestinal:  Negative for abdominal pain, constipation, diarrhea and vomiting. Endocrine: Negative for polyuria. Genitourinary:  Negative for dysuria. Musculoskeletal:  Negative for arthralgias and myalgias. Neurological:  Negative for dizziness and syncope. History reviewed. No pertinent past medical history.   Past Surgical History:   Procedure Laterality Date    VAGINAL DELIVERY       Family History   Problem Relation Age of Onset    Breast cancer Mother 36    Stroke Father     Diabetes Father     Diabetes type II Father     No Known Problems Sister     No Known Problems Son     Ovarian cancer Maternal Aunt     No Known Problems Maternal Aunt     No Known Problems Maternal Aunt     No Known Problems Maternal Aunt     No Known Problems Maternal Aunt     No Known Problems Maternal Aunt     No Known Problems Paternal Aunt     No Known Problems Paternal Aunt      Social History     Socioeconomic History    Marital status: Single     Spouse name: None    Number of children: 2    Years of education: None    Highest education level: None   Occupational History    Occupation: Therapist   Tobacco Use    Smoking status: Never    Smokeless tobacco: Never   Substance and Sexual Activity    Alcohol use: Yes     Comment: social; denied per allscripts    Drug use: No    Sexual activity: None   Other Topics Concern    None   Social History Narrative    Lives with spouse and 2 children     Social Determinants of Health     Financial Resource Strain: Not on file   Food Insecurity: Not on file   Transportation Needs: Not on file   Physical Activity: Not on file   Stress: Not on file   Social Connections: Not on file   Intimate Partner Violence: Not on file   Housing Stability: Not on file     No current outpatient medications on file prior to visit. Allergies   Allergen Reactions    Pollen Extract      Immunization History   Administered Date(s) Administered    COVID-19 PFIZER VACCINE 0.3 ML IM 04/15/2021, 05/06/2021    Influenza, injectable, quadrivalent, preservative free 0.5 mL 11/29/2022    Influenza, seasonal, injectable, preservative free 11/09/2015    MMR 03/24/2022    Tdap 01/01/1999, 11/30/2015    Tuberculin Skin Test-PPD Intradermal 09/01/2015, 09/01/2015       Objective     /78 (BP Location: Left arm, Patient Position: Sitting, Cuff Size: Large)   Pulse 79   Temp 98.2 °F (36.8 °C)   Resp 16   Ht 6' (1.829 m)   Wt 113 kg (248 lb 8 oz)   SpO2 96%   BMI 33.70 kg/m²     Physical Exam  Constitutional:       Appearance: Normal appearance. HENT:      Head: Normocephalic and atraumatic. Nose: Nose normal.   Eyes:      Extraocular Movements: Extraocular movements intact. Conjunctiva/sclera: Conjunctivae normal.   Cardiovascular:      Rate and Rhythm: Normal rate. Pulmonary:      Effort: Pulmonary effort is normal.   Musculoskeletal:         General: Normal range of motion. Cervical back: Normal range of motion. No rigidity. Lumbar back: No tenderness. Negative right straight leg raise test and negative left straight leg raise test.   Skin:     General: Skin is warm and dry. Neurological:      General: No focal deficit present. Mental Status: She is alert and oriented to person, place, and time. Cranial Nerves: No cranial nerve deficit. Motor: No weakness.       Gait: Gait normal.   Psychiatric:         Behavior: Behavior normal.       Monique Donovan MD

## 2023-10-18 NOTE — ASSESSMENT & PLAN NOTE
Fam hx of ruptured brain aneurysm in mother. Patient had MRA wo contrast in 2018 as screening measure. Was not experiencing headaches at that time.

## 2023-10-18 NOTE — ASSESSMENT & PLAN NOTE
Onset 3 weeks ago, gradually worsening. Will investigate with lab work and imaging in the setting of family hx.

## 2023-10-25 ENCOUNTER — HOSPITAL ENCOUNTER (OUTPATIENT)
Dept: MRI IMAGING | Facility: HOSPITAL | Age: 43
Discharge: HOME | End: 2023-10-25
Attending: STUDENT IN AN ORGANIZED HEALTH CARE EDUCATION/TRAINING PROGRAM
Payer: COMMERCIAL

## 2023-10-25 DIAGNOSIS — G89.29 CHRONIC NONINTRACTABLE HEADACHE, UNSPECIFIED HEADACHE TYPE: ICD-10-CM

## 2023-10-25 DIAGNOSIS — R51.9 CHRONIC NONINTRACTABLE HEADACHE, UNSPECIFIED HEADACHE TYPE: ICD-10-CM

## 2023-10-25 PROCEDURE — G1004 CDSM NDSC: HCPCS

## 2023-10-25 PROCEDURE — 70544 MR ANGIOGRAPHY HEAD W/O DYE: CPT

## 2023-10-27 ENCOUNTER — OFFICE VISIT (OUTPATIENT)
Dept: FAMILY MEDICINE CLINIC | Facility: CLINIC | Age: 43
End: 2023-10-27
Payer: COMMERCIAL

## 2023-10-27 VITALS
RESPIRATION RATE: 16 BRPM | BODY MASS INDEX: 33.18 KG/M2 | OXYGEN SATURATION: 98 % | HEART RATE: 63 BPM | WEIGHT: 245 LBS | SYSTOLIC BLOOD PRESSURE: 110 MMHG | HEIGHT: 72 IN | TEMPERATURE: 96.2 F | DIASTOLIC BLOOD PRESSURE: 60 MMHG

## 2023-10-27 DIAGNOSIS — G89.29 CHRONIC NONINTRACTABLE HEADACHE, UNSPECIFIED HEADACHE TYPE: Primary | ICD-10-CM

## 2023-10-27 DIAGNOSIS — R51.9 CHRONIC NONINTRACTABLE HEADACHE, UNSPECIFIED HEADACHE TYPE: Primary | ICD-10-CM

## 2023-10-27 PROCEDURE — 99213 OFFICE O/P EST LOW 20 MIN: CPT | Performed by: STUDENT IN AN ORGANIZED HEALTH CARE EDUCATION/TRAINING PROGRAM

## 2023-10-27 NOTE — PROGRESS NOTES
Name: Tal Escamilla      : 1980      MRN: [id number]  Encounter Provider: Jerod Murray MD  Encounter Date: 10/27/2023   Encounter department: [address] [place] [address]. Chronic nonintractable headache, unspecified headache type  Assessment & Plan:  Dx include tension headache in the setting of TMJ vs migraines. Patient reports symptoms are exacerbated with sitting, should consider IIH as potential dx. Fam hx of brain aneurysm in mother. Follow up MRI head results. Will refer to neurology. Orders:  -     Ambulatory Referral to Neurology; Future           Subjective     This is a very pleasant 36 y.o. female who presents to the clinic for follow up of her chronic headache. MRI head final report still pending. Preliminary lab results wnl. Patient reports continued pain and pressure in her head with prolonged sitting. Reports bitemporal jaw pain for which she is seeing a dentist.     Denies chest pain, sob, n/v/d. Review of Systems   Constitutional:  Negative for chills and fever. HENT:  Negative for congestion, ear pain, rhinorrhea and sinus pain. Eyes:  Negative for visual disturbance. Respiratory:  Negative for chest tightness, shortness of breath and wheezing. Cardiovascular:  Negative for chest pain and palpitations. Gastrointestinal:  Negative for abdominal pain, constipation, diarrhea and vomiting. Endocrine: Negative for polyuria. Genitourinary:  Negative for dysuria. Musculoskeletal:  Negative for arthralgias and myalgias. Neurological:  Negative for dizziness, syncope and light-headedness. History reviewed. No pertinent past medical history.   Past Surgical History:   Procedure Laterality Date    VAGINAL DELIVERY       Family History   Problem Relation Age of Onset    Breast cancer Mother 36    Stroke Father     Diabetes Father     Diabetes type II Father     No Known Problems Sister     No Known Problems Son     Ovarian cancer Maternal Aunt     No Known Problems Maternal Aunt     No Known Problems Maternal Aunt     No Known Problems Maternal Aunt     No Known Problems Maternal Aunt     No Known Problems Maternal Aunt     No Known Problems Paternal Aunt     No Known Problems Paternal Aunt      Social History     Socioeconomic History    Marital status: Single     Spouse name: None    Number of children: 2    Years of education: None    Highest education level: None   Occupational History    Occupation: Therapist   Tobacco Use    Smoking status: Never    Smokeless tobacco: Never   Substance and Sexual Activity    Alcohol use: Yes     Comment: social; denied per allscripts    Drug use: No    Sexual activity: None   Other Topics Concern    None   Social History Narrative    Lives with spouse and 2 children     Social Determinants of Health     Financial Resource Strain: Not on file   Food Insecurity: Not on file   Transportation Needs: Not on file   Physical Activity: Not on file   Stress: Not on file   Social Connections: Not on file   Intimate Partner Violence: Not on file   Housing Stability: Not on file     No current outpatient medications on file prior to visit. Allergies   Allergen Reactions    Pollen Extract      Immunization History   Administered Date(s) Administered    COVID-19 PFIZER VACCINE 0.3 ML IM 04/15/2021, 05/06/2021    Influenza, injectable, quadrivalent, preservative free 0.5 mL 11/29/2022    Influenza, seasonal, injectable, preservative free 11/09/2015    MMR 03/24/2022    Tdap 01/01/1999, 11/30/2015    Tuberculin Skin Test-PPD Intradermal 09/01/2015, 09/01/2015       Objective     /60 (BP Location: Left arm, Patient Position: Sitting, Cuff Size: Large)   Pulse 63   Temp (!) 96.2 °F (35.7 °C)   Resp 16   Ht 6' (1.829 m)   Wt 111 kg (245 lb)   SpO2 98%   BMI 33.23 kg/m²     Physical Exam  Constitutional:       Appearance: Normal appearance. HENT:      Head: Normocephalic and atraumatic.       Nose: Nose normal.   Eyes:      Conjunctiva/sclera: Conjunctivae normal.   Cardiovascular:      Rate and Rhythm: Normal rate. Pulmonary:      Effort: Pulmonary effort is normal.   Musculoskeletal:         General: Normal range of motion. Cervical back: Normal range of motion. Skin:     General: Skin is warm and dry. Neurological:      Mental Status: She is alert and oriented to person, place, and time.    Psychiatric:         Behavior: Behavior normal.       Girma Cutler MD

## 2023-10-27 NOTE — ASSESSMENT & PLAN NOTE
Dx include tension headache in the setting of TMJ vs migraines. Patient reports symptoms are exacerbated with sitting, should consider IIH as potential dx. Fam hx of brain aneurysm in mother. Follow up MRI head results. Will refer to neurology.

## 2023-10-30 ENCOUNTER — TELEPHONE (OUTPATIENT)
Age: 43
End: 2023-10-30

## 2023-10-30 DIAGNOSIS — G89.29 CHRONIC NONINTRACTABLE HEADACHE, UNSPECIFIED HEADACHE TYPE: Primary | ICD-10-CM

## 2023-10-30 DIAGNOSIS — R51.9 CHRONIC NONINTRACTABLE HEADACHE, UNSPECIFIED HEADACHE TYPE: Primary | ICD-10-CM

## 2023-10-30 NOTE — TELEPHONE ENCOUNTER
Jeff Mcmillan at [facility] stated there were  significant findings from a test done 10/25/23.

## 2023-11-09 ENCOUNTER — HOSPITAL ENCOUNTER (OUTPATIENT)
Dept: MRI IMAGING | Facility: HOSPITAL | Age: 43
Discharge: HOME | End: 2023-11-09
Attending: STUDENT IN AN ORGANIZED HEALTH CARE EDUCATION/TRAINING PROGRAM
Payer: COMMERCIAL

## 2023-11-09 DIAGNOSIS — R51.9 CHRONIC NONINTRACTABLE HEADACHE, UNSPECIFIED HEADACHE TYPE: ICD-10-CM

## 2023-11-09 DIAGNOSIS — G89.29 CHRONIC NONINTRACTABLE HEADACHE, UNSPECIFIED HEADACHE TYPE: ICD-10-CM

## 2023-11-09 PROCEDURE — 70553 MRI BRAIN STEM W/O & W/DYE: CPT

## 2023-11-09 PROCEDURE — A9585 GADOBUTROL INJECTION: HCPCS | Performed by: STUDENT IN AN ORGANIZED HEALTH CARE EDUCATION/TRAINING PROGRAM

## 2023-11-09 PROCEDURE — G1004 CDSM NDSC: HCPCS

## 2023-11-09 RX ORDER — GADOBUTROL 604.72 MG/ML
11 INJECTION INTRAVENOUS
Status: COMPLETED | OUTPATIENT
Start: 2023-11-09 | End: 2023-11-09

## 2023-11-09 RX ADMIN — GADOBUTROL 11 ML: 604.72 INJECTION INTRAVENOUS at 19:50

## 2023-11-15 ENCOUNTER — CONSULT (OUTPATIENT)
Dept: NEUROLOGY | Facility: CLINIC | Age: 43
End: 2023-11-15
Payer: COMMERCIAL

## 2023-11-15 VITALS
DIASTOLIC BLOOD PRESSURE: 76 MMHG | BODY MASS INDEX: 33.09 KG/M2 | TEMPERATURE: 98.3 F | WEIGHT: 244 LBS | SYSTOLIC BLOOD PRESSURE: 116 MMHG | HEART RATE: 66 BPM | OXYGEN SATURATION: 99 %

## 2023-11-15 DIAGNOSIS — R51.9 CHRONIC NONINTRACTABLE HEADACHE, UNSPECIFIED HEADACHE TYPE: Primary | ICD-10-CM

## 2023-11-15 DIAGNOSIS — R90.89 ABNORMAL FINDING ON MRI OF BRAIN: ICD-10-CM

## 2023-11-15 DIAGNOSIS — G89.29 CHRONIC NONINTRACTABLE HEADACHE, UNSPECIFIED HEADACHE TYPE: Primary | ICD-10-CM

## 2023-11-15 PROCEDURE — 99245 OFF/OP CONSLTJ NEW/EST HI 55: CPT

## 2023-11-15 NOTE — PROGRESS NOTES
Review of Systems   Constitutional:  Negative for appetite change, fatigue and fever. HENT: Negative. Negative for hearing loss, tinnitus, trouble swallowing and voice change. Eyes:  Positive for pain. Negative for photophobia and visual disturbance. Respiratory: Negative. Negative for shortness of breath. Cardiovascular: Negative. Negative for palpitations. Gastrointestinal: Negative. Negative for nausea and vomiting. Endocrine: Negative. Negative for cold intolerance. Genitourinary: Negative. Negative for dysuria, frequency and urgency. Musculoskeletal:  Negative for back pain, gait problem, myalgias and neck pain. Skin: Negative. Negative for rash. Allergic/Immunologic: Negative. Neurological:  Positive for headaches. Negative for dizziness, tremors, seizures, syncope, facial asymmetry, speech difficulty, weakness, light-headedness and numbness. Hematological: Negative. Does not bruise/bleed easily. Psychiatric/Behavioral: Negative. Negative for confusion, hallucinations and sleep disturbance. All other systems reviewed and are negative.

## 2023-11-15 NOTE — PROGRESS NOTES
[facility]asmina Par[facility] Consult  PATIENT:  Jackie Strickland  MRN:  [id number]  :  1980  DATE OF SERVICE:  11/15/2023  REFERRED BY: Juanita Ann MD  PMD: Jodi Fair MD    Assessment/Plan:     Jackie Strickland is a very pleasant 39 y.o. female with a past medical history that includes thyromegaly, family history of brain aneurysm, sacroiliitis, vitamin D deficiency, situational anxiety, chronic non-intractable headache referred here for evaluation of headache. Initial evaluation 11/15/2023    Chronic non-intractable headache/abnormal MRI brain findings:    I had the pleasure of seeing Justin Wolff today in the office at [facility] neurology [facility] in [facility]. She is presenting today as an initial new patient consultation in regard to a chronic non-intractable headache that the patient has been dealing with for the past few weeks. It was noted that the patient is having this daily headaches/pressure type pain at the left side of her head and feels pressure behind her left eye and having some left-sided ear pain as well. Is also noted that the patient feels a slight burning at the top of that as well usually when the headache starts out as a pressure. It is noted that the patient does have this every day, and the only thing that seems to potentially relieve the headache is standing up. She notes that being in the sitting down position can certainly make the headaches/pressure at the left side of the head worse at this time. It does not appear though that the patient has any positional change headaches specifically, meaning that the patient does not have any headaches that are instantly triggered by positional change currently. She is not noting any early morning headaches and not having any trouble with her vision out of the left eye at this time. With this being said, I do not highly suspect IIH like the patient's primary care had felt there was a concern for.   She did see an optometrist to have a dilated eye exam, and although I prefer she have an ophthalmology evaluation I do not believe that she absolutely needs this at this time. In regard to medication, patient does not currently take any daily medication so therefore I want the patient to start with magnesium 400 mg daily and take riboflavin 400 mg daily for migraine prevention. She should try this for 4 to 6 weeks and let me know how she is feeling afterwards. Patient can use over-the-counter medication to help treat the pain/pressure, although she should not use this more than 2 or 3 days out of the week. In regards to the patient's abnormal MRI findings, patient had 2 areas on the MRI brain pituitary that she recently received that she had questions with. First, there was an area around her pituitary which was concerning to her. There was suspicion that this could have been related to a posterior pituitary bright spot versus Rathke's cleft cyst versus adenoma at this time. Overall, it does not appear that the area had changed significantly since 2018 and was not overall very large or concerning. Patient also had an area around the pontomedullary junction that seems slightly increased since her last MRI brain in 2018. It is suggested that these findings could be considered to be a cavernoma or capillary telangiectasia. With both of these findings, I explained to the patient that it would be best to have neurosurgery evaluate them. Stated to her that I was going to order a neurosurgery consultation they were going to take a look at this. Would let her know if she would need to follow-up with neurosurgery in the future or if we can continue to monitor this with regular repeat imaging. Patient Instructions:     Additional Testing:   - E-Consult for Neurosurgery in regards to pituitary abnormality and recently discovered vessel abnormality     Headache Calendar  Please maintain a headache calendar  Consider using phone applications such as Migraine Jb or Migraine Diary    Headache/migraine treatment:     Rescue medications (for immediate treatment of a headache): It is ok to take ibuprofen, acetaminophen or naproxen (Advil, Tylenol,  Aleve, Excedrin) if they help your headaches you should limit these to No more than 3 times a week to avoid medication overuse/rebound headaches. Over the counter preventive supplements for headaches/migraines (if you try, try for 3 months straight)  (to take every day to help prevent headaches - not to take at the time of headache): There are combo pills online of these - none of which regulated by FDA and double check dosing - take appropriate dose only once a day- prevent a migraine, migravent, mind ease, migrelief   [x] Magnesium 400mg daily (If any diarrhea or upset stomach, decrease dose  as tolerated)  [x] Riboflavin (Vitamin B2) 400mg daily (may make your urine bright/neon yellow)    Lifestyle Recommendations:  [x] SLEEP - Maintain a regular sleep schedule: Adults need at least 7-8 hours of uninterrupted a night. Maintain good sleep hygiene:  Going to bed and waking up at consistent times, avoiding excessive daytime naps, avoiding caffeinated beverages in the evening, avoid excessive stimulation in the evening and generally using bed primarily for sleeping. One hour before bedtime would recommend turning lights down lower, decreasing your activity (may read quietly, listen to music at a low volume). When you get into bed, should eliminate all technology (no texting, emailing, playing with your phone, iPad or tablet in bed). [x] HYDRATION - Maintain good hydration. Drink  2L of fluid a day (4 typical small water bottles)  [x] DIET - Maintain good nutrition. In particular don't skip meals and try and eat healthy balanced meals regularly. [x] TRIGGERS - Look for other triggers and avoid them: Limit caffeine to 1-2 cups a day or less.  Avoid dietary triggers that you have noticed bring on your headaches (this could include aged cheese, peanuts, MSG, aspartame and nitrates). [x] EXERCISE - physical exercise as we all know is good for you in many ways, and not only is good for your heart, but also is beneficial for your mental health, cognitive health and  chronic pain/headaches. I would encourage at the least 5 days of physical exercise weekly for at least 30 minutes. Education and Follow-up  [x] Please call with any questions or concerns. Of course if any new concerning symptoms go to the emergency department. [x] Follow up in 4 months with Bushra Manrique      CC: We had the pleasure of evaluating Ajay Perry in neurological consultation today. Ajay Perry is a  39 y.o. female who presents today for evaluation of headaches. History obtained from patient as well as available medical record review. History of Present Illness:   Current medical illnesses  or past medical history include thyromegaly, family history of brain aneurysm, sacroiliitis, vitamin D deficiency, situational anxiety, chronic non-intractable headache       Interval History:    Patient noted that she started having these headaches at the end of September. She notes that she had a lot of jaw clenching on the left-hand side and was evaluated by dentist for TMJ. She notes that is not suspected that she had TMJ. She is evaluated by an optometrist at [facility]. Although not an eye doctor, stated that the patient's vision was perfectly fine with no issues on dilated eye exam/pictures. Noted that the patient did have the MRA of the head and eventually that had discovered that the patient to have an MRI brain of her pituitary. She had both completed and was told that neurology would be interpreting her results today.   Explained to the patient that as far as her results go, it would be best interpreted by neurosurgery to see if she needs any further evaluation or follow-up in regard and explanation on the recent imaging findings that she has had. Headaches started? End of September of this year. How often do the headaches occur? Occurring almost every day at this time   - as of 11/15/2023:   What time of the day do the headaches start? No particular time of day  How long do the headaches last? Comes and goes throughout the day. Previous headache she tried over the counter medication, with this constant pressure now she doesn't feel like she can take anything. Are you ever headache free? No    Aura? without aura     Where is your headache located and pain quality? Pressure behind the left eye, side of the head. Left sided ear pain. Slight burning at the top of head she had felt before as well. What is the intensity of pain? Worst 8/10, Average: 6-7/10  Associated symptoms:   [x] Problems with concentration  [x] Photophobia (baseline)  [x] Light-headed or dizzy (sometimes)  [x] Feels a bit droopy on the left side of the face, but can not seeing when looking in the mirror    Things that make the headache worse? Sitting will seem to make it worse at this time, standing will seem to actually relieve it at this time. Any positional change headaches? No positional change headaches     Headache triggers:  no specific triggers    Have you seen someone else for headaches or pain? No  Have you had trigger point injection performed and how often? No  Have you had Botox injection performed and how often? No   Have you had epidural injections or transforaminal injections performed? Yes, epidural for birth years ago   Are you current pregnant or planning on getting pregnant? Not planning on pregnancy, no contraception at this time   Have you ever had any Brain imaging? Yes, MRI brain with pituitary or MRA head. Last eye exam: two weeks ago the patient a dilated eye exam. Vision works, saw an optometrist at this point in time. What medications do you take or have you taken for your headaches?    ABORTIVE: OTC medications: Tylenol or Ibuprofen   Prescription: None     Past/ failed/contraindicated:  OTC medications: None   Prescription: None    PREVENTIVE:   None    Past/ failed/contraindicated:  None       LIFESTYLE  Sleep   - averages: 7-8 hours of sleep   Problems falling asleep?:   No  Problems staying asleep?:  No    - Slight snoring, no concern for sleep apnea     Physical activity: Working out more now     Water: about  ounces per day, lately has been less she states   Caffeine: usually not having it daily, does have green tea on daily basis     Mood:Denies history of anxiety or depression or other diagnosed mood disorder    The following portions of the patient's history were reviewed and updated as appropriate: allergies, current medications, past family history, past medical history, past social history, past surgical history and problem list.    Pertinent family history:  Family history of headaches:  no known family members with significant headaches  Any family history of aneurysms - Yes, mother had history of cerebral aneurysm     Pertinent social history:  Work: psychotherapy at [employer]   Education: Master's in psychotherapy   Lives with her son     Illicit Drugs: denies  Alcohol/tobacco: Denies tobacco use, alcohol intake: social drinker    Past Medical History:   History reviewed. No pertinent past medical history. Patient Active Problem List   Diagnosis    Situational anxiety    Family history of brain aneurysm    Thyromegaly    Vitamin D deficiency    Dense breast tissue on mammogram    Chronic nonintractable headache       Medications:      No current outpatient medications on file. No current facility-administered medications for this visit. Allergies:       Allergies   Allergen Reactions    Pollen Extract        Family History:     Family History   Problem Relation Age of Onset    Breast cancer Mother 36    Stroke Father     Diabetes Father     Diabetes type II Father No Known Problems Sister     No Known Problems Son     Ovarian cancer Maternal Aunt     No Known Problems Maternal Aunt     No Known Problems Maternal Aunt     No Known Problems Maternal Aunt     No Known Problems Maternal Aunt     No Known Problems Maternal Aunt     No Known Problems Paternal Aunt     No Known Problems Paternal Aunt        Social History:       Social History     Socioeconomic History    Marital status: Single     Spouse name: Not on file    Number of children: 2    Years of education: Not on file    Highest education level: Not on file   Occupational History    Occupation: Therapist   Tobacco Use    Smoking status: Never    Smokeless tobacco: Never   Vaping Use    Vaping Use: Never used   Substance and Sexual Activity    Alcohol use: Yes     Comment: social; denied per allscripts    Drug use: No    Sexual activity: Not on file   Other Topics Concern    Not on file   Social History Narrative    Lives with spouse and 2 children     Social Determinants of Health     Financial Resource Strain: Not on file   Food Insecurity: Not on file   Transportation Needs: Not on file   Physical Activity: Not on file   Stress: Not on file   Social Connections: Not on file   Intimate Partner Violence: Not on file   Housing Stability: Not on file         Objective:     Physical Exam:                                                                 Vitals:            Constitutional:    /76 (BP Location: Right arm, Patient Position: Sitting, Cuff Size: Adult)   Pulse 66   Temp 98.3 °F (36.8 °C) (Temporal)   Wt 111 kg (244 lb)   SpO2 99%   BMI 33.09 kg/m²   BP Readings from Last 3 Encounters:   11/15/23 116/76   10/27/23 110/60   10/18/23 118/78     Pulse Readings from Last 3 Encounters:   11/15/23 66   10/27/23 63   10/18/23 79         Well developed, well nourished, well groomed. No dysmorphic features.        Psychiatric:  Normal behavior and appropriate affect        Neurological Examination:     Mental status/cognitive function:   Orientated to time, place and person. Recent and remote memory intact. Attention span and concentration as well as fund of knowledge are appropriate for age. Normal language and spontaneous speech. Cranial Nerves:  II-visual fields full. III, IV, VI-Pupils were equal, round, and reactive to light and accomodation. Extraocular movements were full and conjugate without nystagmus. Conjugate gaze, normal smooth pursuits, normal saccades   V-facial sensation symmetric. VII-facial expression symmetric, intact forehead wrinkle, strong eye closure, symmetric smile    VIII-hearing grossly intact bilaterally   IX, X-palate elevation symmetric, no dysarthria. XI-shoulder shrug strength intact    XII-tongue protrusion midline. Motor Exam: symmetric bulk and tone throughout, no pronator drift. Power/strength 5/5 bilateral upper and lower extremities, no atrophy, fasciculations or abnormal movements noted. Sensory: grossly intact light touch in all extremities. Reflexes: brachioradialis 2+, biceps 2+, knee 2+ bilaterally  Coordination: Finger nose finger intact bilaterally, no apparent dysmetria, ataxia or tremor noted  Gait: steady casual and tandem gait. Pertinent Imaging:     MRA head without contrast, 10/25/2023:    IMPRESSION:     1. No intracranial aneurysm or major intracranial arterial stenosis. 2. Prominent hyperintensity in the pituitary fossa, measuring 0.7 cm, possibly volume averaging artifact/prominence of the posterior bright spot. However pituitary adenoma or other pituitary mass lesion not excluded. Contrast-enhanced MRI of the brain   and pituitary gland recommended for further assessment. MRI brain pituitary without and with contrast, 11/9/2023:    IMPRESSION:     Stable prominent T1 shortening within the posterior and right aspect of the pituitary gland dating back to prior examinations since 2018. Overall appearance and morphology is unchanged. Differential diagnosis should include an atypical posterior   pituitary bright spot versus small Rathke's cleft cyst or adenoma adjacent to a posterior pituitary bright spot. Incidental note is also made of a small focus of susceptibility within the pontomedullary junction, slightly increased in size since 3/3/2018. Findings may represent a small cavernoma or area of capillary telangiectasia. No evidence for acute infarction. No abnormal parenchymal or leptomeningeal enhancement. Review of Systems:     Review of Systems   Constitutional:  Negative for appetite change, fatigue and fever. HENT: Negative. Negative for hearing loss, tinnitus, trouble swallowing and voice change. Eyes:  Positive for pain. Negative for photophobia and visual disturbance. Respiratory: Negative. Negative for shortness of breath. Cardiovascular: Negative. Negative for palpitations. Gastrointestinal: Negative. Negative for nausea and vomiting. Endocrine: Negative. Negative for cold intolerance. Genitourinary: Negative. Negative for dysuria, frequency and urgency. Musculoskeletal:  Negative for back pain, gait problem, myalgias and neck pain. Skin: Negative. Negative for rash. Allergic/Immunologic: Negative. Neurological:  Positive for headaches. Negative for dizziness, tremors, seizures, syncope, facial asymmetry, speech difficulty, weakness, light-headedness and numbness. Hematological: Negative. Does not bruise/bleed easily. Psychiatric/Behavioral: Negative. Negative for confusion, hallucinations and sleep disturbance. All other systems reviewed and are negative.        I have spent 60 minutes with the patient today in which greater than 50% of this time was spent in counseling/coordination of care regarding Diagnostic results, Risks and benefits of tx options, Instructions for management, Patient and family education, Importance of tx compliance, Risk factor reductions, Impressions, Counseling / Coordination of care, Documenting in the medical record, Reviewing / ordering tests, medicine, procedures  , and Obtaining or reviewing history  . I also spent 20 minutes non face to face for this patient the same day.      Activity Minutes   Precharting/reviewing 10   Patient care/counseling 60   Postcharting/care coordination 10       Author:  Los Carreon PA-C 11/15/2023 8:25 AM

## 2023-11-15 NOTE — PATIENT INSTRUCTIONS
Patient Instructions: Additional Testing:   - E-Consult for Neurosurgery in regards to pituitary abnormality and recently discovered vessel abnormality     Headache Calendar  Please maintain a headache calendar  Consider using phone applications such as Migraine Jb or Migraine Diary    Headache/migraine treatment:     Rescue medications (for immediate treatment of a headache): It is ok to take ibuprofen, acetaminophen or naproxen (Advil, Tylenol,  Aleve, Excedrin) if they help your headaches you should limit these to No more than 3 times a week to avoid medication overuse/rebound headaches. Over the counter preventive supplements for headaches/migraines (if you try, try for 3 months straight)  (to take every day to help prevent headaches - not to take at the time of headache): There are combo pills online of these - none of which regulated by FDA and double check dosing - take appropriate dose only once a day- prevent a migraine, migravent, mind ease, migrelief   [x] Magnesium 400mg daily (If any diarrhea or upset stomach, decrease dose  as tolerated)  [x] Riboflavin (Vitamin B2) 400mg daily (may make your urine bright/neon yellow)    Lifestyle Recommendations:  [x] SLEEP - Maintain a regular sleep schedule: Adults need at least 7-8 hours of uninterrupted a night. Maintain good sleep hygiene:  Going to bed and waking up at consistent times, avoiding excessive daytime naps, avoiding caffeinated beverages in the evening, avoid excessive stimulation in the evening and generally using bed primarily for sleeping. One hour before bedtime would recommend turning lights down lower, decreasing your activity (may read quietly, listen to music at a low volume). When you get into bed, should eliminate all technology (no texting, emailing, playing with your phone, iPad or tablet in bed). [x] HYDRATION - Maintain good hydration.   Drink  2L of fluid a day (4 typical small water bottles)  [x] DIET - Maintain good nutrition. In particular don't skip meals and try and eat healthy balanced meals regularly. [x] TRIGGERS - Look for other triggers and avoid them: Limit caffeine to 1-2 cups a day or less. Avoid dietary triggers that you have noticed bring on your headaches (this could include aged cheese, peanuts, MSG, aspartame and nitrates). [x] EXERCISE - physical exercise as we all know is good for you in many ways, and not only is good for your heart, but also is beneficial for your mental health, cognitive health and  chronic pain/headaches. I would encourage at the least 5 days of physical exercise weekly for at least 30 minutes. Education and Follow-up  [x] Please call with any questions or concerns. Of course if any new concerning symptoms go to the emergency department.   [x] Follow up in 4 months with Marino Green PA-C

## 2023-11-17 ENCOUNTER — E-CONSULT (OUTPATIENT)
Dept: NEUROSURGERY | Facility: CLINIC | Age: 43
End: 2023-11-17

## 2023-11-17 DIAGNOSIS — Q28.3 CAVERNOUS MALFORMATION: ICD-10-CM

## 2023-11-17 DIAGNOSIS — E23.7 PITUITARY ABNORMALITY (HCC): Primary | ICD-10-CM

## 2023-11-17 NOTE — PROGRESS NOTES
E-Consult  Alvarez Garcia 39 y.o. 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 Total time spent 11-20 minutes, >50% of the total time devoted to medical consultative verbal/EMR discussion between providers. Written report will be generated in the EMR. Kimberlyn Ribeiro

## 2023-11-18 ENCOUNTER — PATIENT MESSAGE (OUTPATIENT)
Dept: NEUROLOGY | Facility: CLINIC | Age: 43
End: 2023-11-18

## 2023-11-27 NOTE — PATIENT COMMUNICATION
Notes from Main Encounter       Message     Riya Mtz PA-C   to [facility] 4 11/18/2023 11:36 AM   Hello,    I have already sent a message to Brittni about the results of her E consult. However, if someone could just reach out to her as well next week and let her know what was stated by Dr. Enrrique Justin. Which was basically just her results do not require any neurosurgical intervention. But she is more than welcome to follow-up in the office with one of the APs and neurosurgeons at a joint visit for further monitoring and evaluation. Please let me know if this is something that she would like to pursue and I will place an ambulatory referral to neurosurgery if she would like. Thank you!

## 2023-11-29 NOTE — PATIENT COMMUNICATION
Reached vm; left detailed message regarding neurosurgery input; requested cb or my chart message if she wishes Taras Noss to place a referral to see neurosurgery for monitoring/discussion.

## 2023-12-28 ENCOUNTER — OFFICE VISIT (OUTPATIENT)
Dept: FAMILY MEDICINE CLINIC | Facility: CLINIC | Age: 43
End: 2023-12-28
Payer: COMMERCIAL

## 2023-12-28 VITALS
WEIGHT: 247 LBS | DIASTOLIC BLOOD PRESSURE: 62 MMHG | SYSTOLIC BLOOD PRESSURE: 112 MMHG | RESPIRATION RATE: 16 BRPM | HEART RATE: 77 BPM | BODY MASS INDEX: 33.46 KG/M2 | HEIGHT: 72 IN | OXYGEN SATURATION: 97 % | TEMPERATURE: 98.1 F

## 2023-12-28 DIAGNOSIS — J01.00 ACUTE NON-RECURRENT MAXILLARY SINUSITIS: Primary | ICD-10-CM

## 2023-12-28 PROCEDURE — 99213 OFFICE O/P EST LOW 20 MIN: CPT | Performed by: FAMILY MEDICINE

## 2023-12-28 RX ORDER — AMOXICILLIN AND CLAVULANATE POTASSIUM 875; 125 MG/1; MG/1
1 TABLET, FILM COATED ORAL EVERY 12 HOURS SCHEDULED
Qty: 14 TABLET | Refills: 0 | Status: SHIPPED | OUTPATIENT
Start: 2023-12-28 | End: 2024-01-04

## 2023-12-28 RX ORDER — FLUTICASONE PROPIONATE 50 MCG
1 SPRAY, SUSPENSION (ML) NASAL DAILY
Qty: 9.9 ML | Refills: 0 | Status: SHIPPED | OUTPATIENT
Start: 2023-12-28

## 2023-12-28 NOTE — PROGRESS NOTES
Name: Jeanne Griffin      : 1980      MRN: [id number]  Encounter Provider: Moris Connors MD  Encounter Date: 2023   Encounter department: [facility]    Assessment & Plan     1. Acute non-recurrent maxillary sinusitis  -     amoxicillin-clavulanate (AUGMENTIN) 875-125 mg per tablet; Take 1 tablet by mouth every 12 (twelve) hours for 7 days  -     fluticasone (FLONASE) 50 mcg/act nasal spray; 1 spray into each nostril daily    Covid negative in office symptoms for the past 10 days.       BMI Counseling: Body mass index is 33.5 kg/m². The BMI is above normal. Nutrition recommendations include decreasing portion sizes, consuming healthier snacks, reducing intake of saturated and trans fat and reducing intake of cholesterol. Exercise recommendations include moderate physical activity 150 minutes/week. No pharmacotherapy was ordered. Patient referred to PCP. Rationale for BMI follow-up plan is due to patient being overweight or obese.         Subjective      Presents today with sinus congestion.  She has a history of sinus infections.  She reports cough, runny nose, congestion at night with green and yellow mucus.  This started last week.  She has been taking Mucinex, vitamin C along with cold and cough flu medication over-the-counter.  Her last COVID test was -1-week ago.    Sinus Problem  Associated symptoms include congestion, coughing, headaches and sinus pressure. Pertinent negatives include no sore throat.     Review of Systems   Constitutional:  Positive for fatigue.   HENT:  Positive for congestion, rhinorrhea and sinus pressure. Negative for sore throat.    Respiratory:  Positive for cough.    Neurological:  Positive for headaches.       No current outpatient medications on file prior to visit.       Objective     /62 (BP Location: Left arm, Patient Position: Sitting, Cuff Size: Large)   Pulse 77   Temp 98.1 °F (36.7 °C)   Resp 16   Ht 6' (1.829 m)   Wt 112 kg  (247 lb)   SpO2 97%   BMI 33.50 kg/m²     Physical Exam  Vitals and nursing note reviewed.   Constitutional:       Appearance: She is well-developed.   HENT:      Head: Normocephalic and atraumatic.      Nose: Mucosal edema, congestion and rhinorrhea present.      Right Turbinates: Enlarged.      Left Turbinates: Enlarged.      Right Sinus: Frontal sinus tenderness present.      Left Sinus: Frontal sinus tenderness present.      Mouth/Throat:      Pharynx: No posterior oropharyngeal erythema.   Cardiovascular:      Rate and Rhythm: Normal rate and regular rhythm.   Pulmonary:      Effort: Pulmonary effort is normal.      Breath sounds: Normal breath sounds.   Neurological:      General: No focal deficit present.      Mental Status: She is alert.   Psychiatric:         Mood and Affect: Mood normal.         Behavior: Behavior normal.       Moris Connors MD

## 2024-01-11 ENCOUNTER — OFFICE VISIT (OUTPATIENT)
Dept: FAMILY MEDICINE CLINIC | Facility: CLINIC | Age: 44
End: 2024-01-11
Payer: COMMERCIAL

## 2024-01-11 VITALS
WEIGHT: 243 LBS | RESPIRATION RATE: 16 BRPM | OXYGEN SATURATION: 98 % | HEART RATE: 83 BPM | SYSTOLIC BLOOD PRESSURE: 114 MMHG | BODY MASS INDEX: 32.91 KG/M2 | HEIGHT: 72 IN | DIASTOLIC BLOOD PRESSURE: 72 MMHG | TEMPERATURE: 98.7 F

## 2024-01-11 DIAGNOSIS — M54.50 ACUTE BILATERAL LOW BACK PAIN WITHOUT SCIATICA: Primary | ICD-10-CM

## 2024-01-11 PROCEDURE — 99213 OFFICE O/P EST LOW 20 MIN: CPT | Performed by: FAMILY MEDICINE

## 2024-01-11 RX ORDER — METHOCARBAMOL 500 MG/1
500 TABLET, FILM COATED ORAL
Qty: 30 TABLET | Refills: 0 | Status: SHIPPED | OUTPATIENT
Start: 2024-01-11

## 2024-01-11 RX ORDER — METHYLPREDNISOLONE 4 MG/1
TABLET ORAL
Qty: 21 EACH | Refills: 0 | Status: SHIPPED | OUTPATIENT
Start: 2024-01-11

## 2024-01-11 NOTE — PROGRESS NOTES
Name: Jeanne Griffin      : 1980      MRN: [id number]  Encounter Provider: Moris Connors MD  Encounter Date: 2024   Encounter department: [facility]    Assessment & Plan     1. Acute bilateral low back pain without sciatica  -     methylPREDNISolone 4 MG tablet therapy pack; Use as directed on package  -     methocarbamol (ROBAXIN) 500 mg tablet; Take 1 tablet (500 mg total) by mouth daily at bedtime as needed for muscle spasms      Starting to radiate into buttocks. Start Medrol Dosepak  Continue to ice low back 4x daily   If no improvement following steroid we will send to physical therapy.       Subjective      Patient presents today with acute low back pain.  States she was putting her cousin into a car seat and felt a pop in her back.  She now is experiencing pain in the low back which radiates into her buttocks.  Present for the past 2 weeks.  No relief with over-the-counter medications.      Review of Systems   Musculoskeletal:  Positive for back pain and myalgias. Negative for gait problem.   Neurological:  Negative for weakness, light-headedness and numbness.       Current Outpatient Medications on File Prior to Visit   Medication Sig   • fluticasone (FLONASE) 50 mcg/act nasal spray 1 spray into each nostril daily       Objective     /72 (BP Location: Left arm, Patient Position: Standing, Cuff Size: Standard)   Pulse 83   Temp 98.7 °F (37.1 °C) (Temporal)   Resp 16   Ht 6' (1.829 m)   Wt 110 kg (243 lb)   SpO2 98%   BMI 32.96 kg/m²     Physical Exam  Musculoskeletal:         General: Tenderness present.      Lumbar back: Spasms and tenderness present. No bony tenderness. Negative right straight leg raise test and negative left straight leg raise test.   Skin:     General: Skin is warm and dry.   Neurological:      General: No focal deficit present.      Mental Status: She is oriented to person, place, and time.   Psychiatric:         Mood and Affect: Mood  normal.         Behavior: Behavior normal.       Moris Connors MD

## 2024-02-19 ENCOUNTER — HOSPITAL ENCOUNTER (OUTPATIENT)
Dept: MAMMOGRAPHY | Facility: HOSPITAL | Age: 44
Discharge: HOME | End: 2024-02-19
Attending: FAMILY MEDICINE
Payer: COMMERCIAL

## 2024-02-19 VITALS — BODY MASS INDEX: 31.83 KG/M2 | WEIGHT: 235 LBS | HEIGHT: 72 IN

## 2024-02-19 DIAGNOSIS — Z12.31 ENCOUNTER FOR SCREENING MAMMOGRAM FOR MALIGNANT NEOPLASM OF BREAST: ICD-10-CM

## 2024-02-19 PROCEDURE — 77063 BREAST TOMOSYNTHESIS BI: CPT

## 2024-02-19 PROCEDURE — 77067 SCR MAMMO BI INCL CAD: CPT

## 2024-03-09 ENCOUNTER — OFFICE VISIT (OUTPATIENT)
Dept: URGENT CARE | Age: 44
End: 2024-03-09
Payer: COMMERCIAL

## 2024-03-09 VITALS
HEART RATE: 81 BPM | OXYGEN SATURATION: 99 % | RESPIRATION RATE: 18 BRPM | SYSTOLIC BLOOD PRESSURE: 119 MMHG | DIASTOLIC BLOOD PRESSURE: 82 MMHG | TEMPERATURE: 97.7 F

## 2024-03-09 DIAGNOSIS — R39.9 UTI SYMPTOMS: Primary | ICD-10-CM

## 2024-03-09 LAB
SL AMB  POCT GLUCOSE, UA: ABNORMAL
SL AMB LEUKOCYTE ESTERASE,UA: ABNORMAL
SL AMB POCT BILIRUBIN,UA: ABNORMAL
SL AMB POCT BLOOD,UA: ABNORMAL
SL AMB POCT CLARITY,UA: ABNORMAL
SL AMB POCT COLOR,UA: YELLOW
SL AMB POCT KETONES,UA: ABNORMAL
SL AMB POCT NITRITE,UA: ABNORMAL
SL AMB POCT PH,UA: 6
SL AMB POCT SPECIFIC GRAVITY,UA: 6
SL AMB POCT URINE PROTEIN: 30
SL AMB POCT UROBILINOGEN: ABNORMAL

## 2024-03-09 PROCEDURE — 99213 OFFICE O/P EST LOW 20 MIN: CPT | Performed by: STUDENT IN AN ORGANIZED HEALTH CARE EDUCATION/TRAINING PROGRAM

## 2024-03-09 PROCEDURE — 87077 CULTURE AEROBIC IDENTIFY: CPT | Performed by: STUDENT IN AN ORGANIZED HEALTH CARE EDUCATION/TRAINING PROGRAM

## 2024-03-09 PROCEDURE — 87186 SC STD MICRODIL/AGAR DIL: CPT | Performed by: STUDENT IN AN ORGANIZED HEALTH CARE EDUCATION/TRAINING PROGRAM

## 2024-03-09 PROCEDURE — 87086 URINE CULTURE/COLONY COUNT: CPT | Performed by: STUDENT IN AN ORGANIZED HEALTH CARE EDUCATION/TRAINING PROGRAM

## 2024-03-09 PROCEDURE — 81002 URINALYSIS NONAUTO W/O SCOPE: CPT | Performed by: STUDENT IN AN ORGANIZED HEALTH CARE EDUCATION/TRAINING PROGRAM

## 2024-03-09 RX ORDER — NITROFURANTOIN 25; 75 MG/1; MG/1
100 CAPSULE ORAL 2 TIMES DAILY
Qty: 10 CAPSULE | Refills: 0 | Status: SHIPPED | OUTPATIENT
Start: 2024-03-09 | End: 2024-03-14

## 2024-03-09 NOTE — PROGRESS NOTES
[facility]        NAME: Jeanne Griffin is a 43 y.o. female  : 1980    MRN: [id number]  DATE: 2024  TIME: 10:04 AM    Assessment and Plan   UTI symptoms [R39.9]  1. UTI symptoms  POCT urine dip    Urine culture    nitrofurantoin (MACROBID) 100 mg capsule      Large blood and large leukocytes on urine dip.  Will follow-up urine culture.  Start Macrobid based on previous urine culture which was E. coli sensitive to Macrobid.      Patient Instructions   Please take antibiotic as prescribed.  We are sending out your urine for culture.  We will give you a call if we need to adjust the antibiotic.  If you develop worsening/severe symptoms despite being on antibiotics such as severe abdominal or flank pain, new fevers, please go to the ER.    Follow up with PCP in 3-5 days.  Proceed to  ER if symptoms worsen.    If tests have been performed at [facility], our office will contact you with results if changes need to be made to the care plan discussed with you at the visit.  You can review your full results on [facility].    Chief Complaint     Chief Complaint   Patient presents with    Possible UTI     Pressure, frequency and burning with urination started yesterday morning.          History of Present Illness       Patient presents for symptoms similar to her previous UTIs which started 1 day ago.  Symptoms include dysuria, frequency, urgency.  No abdominal nor flank pain.  No fevers no chills.          Review of Systems   Review of Systems   All other systems reviewed and are negative.        Current Medications       Current Outpatient Medications:     nitrofurantoin (MACROBID) 100 mg capsule, Take 1 capsule (100 mg total) by mouth 2 (two) times a day for 5 days, Disp: 10 capsule, Rfl: 0    fluticasone (FLONASE) 50 mcg/act nasal spray, 1 spray into each nostril daily (Patient not taking: Reported on 3/9/2024), Disp: 9.9 mL, Rfl: 0    methocarbamol (ROBAXIN) 500 mg tablet, Take 1 tablet (500  mg total) by mouth daily at bedtime as needed for muscle spasms (Patient not taking: Reported on 3/9/2024), Disp: 30 tablet, Rfl: 0    methylPREDNISolone 4 MG tablet therapy pack, Use as directed on package (Patient not taking: Reported on 3/9/2024), Disp: 21 each, Rfl: 0    Current Allergies     Allergies as of 03/09/2024 - Reviewed 03/09/2024   Allergen Reaction Noted    Pollen extract  06/23/2015            The following portions of the patient's history were reviewed and updated as appropriate: allergies, current medications, past family history, past medical history, past social history, past surgical history and problem list.     No past medical history on file.    Past Surgical History:   Procedure Laterality Date    VAGINAL DELIVERY         Family History   Problem Relation Age of Onset    Breast cancer Mother 40    Stroke Father     Diabetes Father     Diabetes type II Father     No Known Problems Sister     No Known Problems Son     Ovarian cancer Maternal Aunt     No Known Problems Maternal Aunt     No Known Problems Maternal Aunt     No Known Problems Maternal Aunt     No Known Problems Maternal Aunt     No Known Problems Maternal Aunt     No Known Problems Paternal Aunt     No Known Problems Paternal Aunt          Medications have been verified.        Objective   /82   Pulse 81   Temp 97.7 °F (36.5 °C)   Resp 18   LMP 02/12/2024 (Approximate)   SpO2 99%   Patient's last menstrual period was 02/12/2024 (approximate).       Physical Exam     Physical Exam  Vitals and nursing note reviewed.   Constitutional:       General: She is not in acute distress.     Appearance: She is not toxic-appearing.   HENT:      Head: Normocephalic and atraumatic.      Right Ear: External ear normal.      Left Ear: External ear normal.      Nose: Nose normal.      Mouth/Throat:      Mouth: Mucous membranes are moist.   Eyes:      Extraocular Movements: Extraocular movements intact.      Conjunctiva/sclera:  Conjunctivae normal.   Cardiovascular:      Rate and Rhythm: Normal rate and regular rhythm.      Heart sounds: Normal heart sounds.   Pulmonary:      Effort: Pulmonary effort is normal.      Breath sounds: Normal breath sounds.   Abdominal:      General: Abdomen is flat.      Tenderness: There is no right CVA tenderness or left CVA tenderness.   Skin:     General: Skin is warm and dry.   Neurological:      Mental Status: She is alert.

## 2024-03-09 NOTE — PATIENT INSTRUCTIONS
Please take antibiotic as prescribed.  We are sending out your urine for culture.  We will give you a call if we need to adjust the antibiotic.  If you develop worsening/severe symptoms despite being on antibiotics such as severe abdominal or flank pain, new fevers, please go to the ER.

## 2024-03-11 ENCOUNTER — TELEPHONE (OUTPATIENT)
Dept: NEUROLOGY | Facility: CLINIC | Age: 44
End: 2024-03-11

## 2024-03-12 LAB
BACTERIA UR CULT: ABNORMAL
BACTERIA UR CULT: ABNORMAL

## 2024-03-14 ENCOUNTER — TELEPHONE (OUTPATIENT)
Dept: NEUROLOGY | Facility: CLINIC | Age: 44
End: 2024-03-14

## 2024-03-14 NOTE — TELEPHONE ENCOUNTER
Called pt to r/s upcoming appointment due to the provider having a meeting @ that time . I could not leave a message as the mailbox was full.

## 2024-03-20 ENCOUNTER — TELEPHONE (OUTPATIENT)
Age: 44
End: 2024-03-20

## 2024-03-20 ENCOUNTER — OFFICE VISIT (OUTPATIENT)
Dept: FAMILY MEDICINE CLINIC | Facility: CLINIC | Age: 44
End: 2024-03-20
Payer: COMMERCIAL

## 2024-03-20 VITALS
HEART RATE: 76 BPM | TEMPERATURE: 95.8 F | WEIGHT: 245.5 LBS | OXYGEN SATURATION: 98 % | BODY MASS INDEX: 33.25 KG/M2 | RESPIRATION RATE: 16 BRPM | DIASTOLIC BLOOD PRESSURE: 68 MMHG | HEIGHT: 72 IN | SYSTOLIC BLOOD PRESSURE: 114 MMHG

## 2024-03-20 DIAGNOSIS — M79.18 MYOFASCIAL PAIN SYNDROME: ICD-10-CM

## 2024-03-20 DIAGNOSIS — R51.9 CHRONIC NONINTRACTABLE HEADACHE, UNSPECIFIED HEADACHE TYPE: Primary | ICD-10-CM

## 2024-03-20 DIAGNOSIS — R39.9 UTI SYMPTOMS: ICD-10-CM

## 2024-03-20 DIAGNOSIS — R90.89 ABNORMAL BRAIN MRI: ICD-10-CM

## 2024-03-20 DIAGNOSIS — G89.29 CHRONIC NONINTRACTABLE HEADACHE, UNSPECIFIED HEADACHE TYPE: Primary | ICD-10-CM

## 2024-03-20 PROBLEM — F41.8 SITUATIONAL ANXIETY: Status: RESOLVED | Noted: 2018-02-13 | Resolved: 2024-03-20

## 2024-03-20 PROCEDURE — 99214 OFFICE O/P EST MOD 30 MIN: CPT | Performed by: FAMILY MEDICINE

## 2024-03-20 RX ORDER — NITROFURANTOIN 25; 75 MG/1; MG/1
100 CAPSULE ORAL 2 TIMES DAILY
Qty: 10 CAPSULE | Refills: 0 | Status: SHIPPED | OUTPATIENT
Start: 2024-03-20 | End: 2024-03-25

## 2024-03-20 NOTE — PROGRESS NOTES
Name: Jeanne Griffin      : 1980      MRN: [id number]  Encounter Provider: Lacho Arcos MD  Encounter Date: 3/20/2024   Encounter department: [facility]    Assessment & Plan     1. Chronic nonintractable headache, unspecified headache type  -     Sedimentation rate, automated; Future    2. Abnormal brain MRI  -     Prolactin; Future    3. UTI symptoms  -     nitrofurantoin (MACROBID) 100 mg capsule; Take 1 capsule (100 mg total) by mouth 2 (two) times a day for 5 days    Continue with Mg++/Riboflavin. Follow up with Neurology     Labs ESR/prolactin    Consider trigger point injections        Subjective     Follow up visit for chronic headaches. Initially seen 10/2023 at that time she was experiencing pressure over frontal sinus areas with posterior neck/trapezius  pain. She is now experiencing recurrent generalized headaches-pressure. No N/V. No visual changes. + FH brain aneurysm mother. Headaches have improved on Mg++ supplement and Riboflavin       10/2023 MRA No intracranial aneurysm or major intracranial arterial stenosis.   Prominent hyperintensity in the pituitary fossa, measuring 0.7 cm, possibly volume averaging artifact/prominence of the posterior bright spot.     2023 MRI brain/pituitary- Stable prominent T1 shortening within the posterior and right aspect of the pituitary gland dating back to prior examinations since 2018. Overall appearance and morphology is unchanged. Differential diagnosis should include an atypical posterior   pituitary bright spot versus small Rathke's cleft cyst or adenoma adjacent to a posterior pituitary bright spot.     Incidental note is also made of a small focus of susceptibility within the pontomedullary junction, slightly increased in size since 3/3/2018. Findings may represent a small cavernoma or area of capillary telangiectasia.     No evidence for acute infarction. No abnormal parenchymal or leptomeningeal enhancement.           Lab  Results       Component                Value               Date                       WBC                      8.24                10/18/2023                 HGB                      13.6                10/18/2023                 HCT                      38.9                10/18/2023                 MCV                      88                  10/18/2023                 PLT                      334                 10/18/2023      Lab Results       Component                Value               Date                       SODIUM                   140                 10/18/2023                 K                        4.4                 10/18/2023                 CL                       104                 10/18/2023                 CO2                      29                  10/18/2023                 AGAP                     7                   10/18/2023                 BUN                      12                  10/18/2023                 CREATININE               0.80                10/18/2023                 GLUC                     72                  10/18/2023                 CALCIUM                  9.7                 10/18/2023                 AST                      16                  10/18/2023                 ALT                      18                  10/18/2023                 ALKPHOS                  75                  10/18/2023                 TP                       7.1                 10/18/2023                 TBILI                    0.48                10/18/2023                 EGFR                     90                  10/18/2023              Lab Results       Component                Value               Date                       TSH                      1.640               02/20/2018                            Review of Systems   Constitutional:  Negative for appetite change, fatigue and unexpected weight change.   HENT:  Negative for congestion, ear pain, rhinorrhea and sore  throat.    Eyes:  Negative for visual disturbance.   Respiratory:  Negative for cough, shortness of breath and wheezing.    Cardiovascular:  Negative for chest pain and palpitations.   Gastrointestinal:  Negative for abdominal pain, blood in stool, constipation, diarrhea, nausea and vomiting.   Genitourinary:  Negative for difficulty urinating.        Recent treatment for UTI improved still w/ mild burning after voiding    Musculoskeletal:  Positive for neck pain. Negative for arthralgias and myalgias.        See HPI    Skin:  Negative for rash.   Neurological:  Positive for headaches. Negative for dizziness.        See HPI    Hematological:  Negative for adenopathy. Does not bruise/bleed easily.        Lab Results       Component                Value               Date                       WBC                      8.24                10/18/2023                 HGB                      13.6                10/18/2023                 HCT                      38.9                10/18/2023                 MCV                      88                  10/18/2023                 PLT                      334                 10/18/2023              Lab Results       Component                Value               Date                       FERRITIN                 124                 10/18/2023            Lab Results       Component                Value               Date                       [id number]               694                 10/18/2023            Lab Results       Component                Value               Date                       FOLATE                   20.7                10/18/2023               Psychiatric/Behavioral:  Negative for dysphoric mood and sleep disturbance.        History reviewed. No pertinent past medical history.  Past Surgical History:   Procedure Laterality Date    VAGINAL DELIVERY       Family History   Problem Relation Age of Onset    Breast cancer Mother 40    Stroke Father      Diabetes Father     Diabetes type II Father     No Known Problems Sister     No Known Problems Son     Ovarian cancer Maternal Aunt     No Known Problems Maternal Aunt     No Known Problems Maternal Aunt     No Known Problems Maternal Aunt     No Known Problems Maternal Aunt     No Known Problems Maternal Aunt     No Known Problems Paternal Aunt     No Known Problems Paternal Aunt      Social History     Socioeconomic History    Marital status: Single     Spouse name: None    Number of children: 2    Years of education: None    Highest education level: None   Occupational History    Occupation: Therapist   Tobacco Use    Smoking status: Never    Smokeless tobacco: Never   Vaping Use    Vaping status: Never Used   Substance and Sexual Activity    Alcohol use: Yes     Comment: social; denied per allscripts    Drug use: No    Sexual activity: None   Other Topics Concern    None   Social History Narrative    Lives with spouse and 2 children     Social Determinants of Health     Financial Resource Strain: Not on file   Food Insecurity: Not on file   Transportation Needs: Not on file   Physical Activity: Not on file   Stress: Not on file   Social Connections: Not on file   Intimate Partner Violence: Not on file   Housing Stability: Not on file     Current Outpatient Medications on File Prior to Visit   Medication Sig    fluticasone (FLONASE) 50 mcg/act nasal spray 1 spray into each nostril daily (Patient not taking: Reported on 3/9/2024)     Allergies   Allergen Reactions    Pollen Extract      Immunization History   Administered Date(s) Administered    COVID-19 PFIZER VACCINE 0.3 ML IM 04/15/2021, 05/06/2021    Influenza, injectable, quadrivalent, preservative free 0.5 mL 11/29/2022    Influenza, seasonal, injectable, preservative free 11/09/2015    MMR 03/24/2022    Tdap 01/01/1999, 11/30/2015    Tuberculin Skin Test-PPD Intradermal 09/01/2015, 09/01/2015       Objective     /68 (BP Location: Left arm, Patient  Position: Sitting, Cuff Size: Large)   Pulse 76   Temp (!) 95.8 °F (35.4 °C)   Resp 16   Ht 6' (1.829 m)   Wt 111 kg (245 lb 8 oz)   LMP 02/12/2024 (Approximate)   SpO2 98%   BMI 33.30 kg/m²     BP Readings from Last 3 Encounters:   03/20/24 114/68   03/09/24 119/82   01/11/24 114/72      Wt Readings from Last 3 Encounters:   03/20/24 111 kg (245 lb 8 oz)   02/19/24 107 kg (235 lb)   01/11/24 110 kg (243 lb)             Physical Exam  Constitutional:       General: She is not in acute distress.  HENT:      Head:      Comments: No scalp tenderness.      Right Ear: Tympanic membrane and ear canal normal.      Left Ear: Tympanic membrane and ear canal normal.      Nose:      Right Sinus: No maxillary sinus tenderness or frontal sinus tenderness.      Left Sinus: No maxillary sinus tenderness or frontal sinus tenderness.   Eyes:      General: No scleral icterus.     Extraocular Movements: Extraocular movements intact.      Conjunctiva/sclera: Conjunctivae normal.      Pupils: Pupils are equal, round, and reactive to light.   Neck:      Thyroid: No thyroid mass or thyromegaly.      Vascular: Normal carotid pulses. No carotid bruit or JVD.   Cardiovascular:      Rate and Rhythm: Normal rate and regular rhythm.      Heart sounds: Normal heart sounds. No murmur heard.     No gallop.   Pulmonary:      Effort: Pulmonary effort is normal. No respiratory distress.      Breath sounds: Normal breath sounds. No wheezing or rales.   Musculoskeletal:      Cervical back: Tenderness present. No bony tenderness. No pain with movement. Normal range of motion.      Comments: + trigger point tender areas sub occipital and trapezius regions    Lymphadenopathy:      Cervical: No cervical adenopathy.   Skin:     Findings: No rash.   Neurological:      General: No focal deficit present.      Mental Status: She is alert and oriented to person, place, and time.   Psychiatric:         Mood and Affect: Mood normal.       Lacho Arcos,  MD

## 2024-03-20 NOTE — TELEPHONE ENCOUNTER
Patient called and stated that she was rescheduled with her neuro from yesterday to 4/19.  She wanted to see if Dr. Arcos would order lab work for her, routine and other specific ones.  I did let her know these would need to be discussed with him to determine if he can order or neuro.  She accepted appointment with Dr. Arcos this afternoon.  MICHAELI for Dr. Arcos

## 2024-03-26 ENCOUNTER — TELEPHONE (OUTPATIENT)
Age: 44
End: 2024-03-26

## 2024-03-26 NOTE — TELEPHONE ENCOUNTER
Pt called in regards to upcoming appointment she has for TPI's.  She is a little apprehensive about getting injections, mainly she is afraid of having an allergic reactions to the medication.  She wanted to know if maybe she should do acupuncture instead.  She does NOT want to cancel appointment, she is asking if maybe you can do acupuncture at the visit instead of TPI's.  Or if you think she will be ok with the injections, she is just really concerned she will have a reaction.

## 2024-03-28 ENCOUNTER — PROCEDURE VISIT (OUTPATIENT)
Dept: FAMILY MEDICINE CLINIC | Facility: CLINIC | Age: 44
End: 2024-03-28
Payer: COMMERCIAL

## 2024-03-28 VITALS
TEMPERATURE: 95.6 F | BODY MASS INDEX: 33.18 KG/M2 | RESPIRATION RATE: 16 BRPM | OXYGEN SATURATION: 100 % | HEIGHT: 72 IN | SYSTOLIC BLOOD PRESSURE: 112 MMHG | DIASTOLIC BLOOD PRESSURE: 68 MMHG | HEART RATE: 64 BPM | WEIGHT: 245 LBS

## 2024-03-28 DIAGNOSIS — M79.18 MYOFASCIAL PAIN SYNDROME: Primary | ICD-10-CM

## 2024-03-28 PROCEDURE — 20552 NJX 1/MLT TRIGGER POINT 1/2: CPT | Performed by: FAMILY MEDICINE

## 2024-03-28 NOTE — PROGRESS NOTES
Universal Protocol:  Consent: Verbal consent obtained.  Risks and benefits: risks, benefits and alternatives were discussed  Consent given by: patient  Supporting Documentation  Indications: pain (Myofascial pain syndrome)   Trigger Point Injections: single/multiple trigger point(s): 1-2 muscle groups   Procedure Details  Location(s):    Neck/Upper Back: L upper trapezius and R upper trapezius     Prep: patient was prepped and draped in usual sterile fashion  Needle size: 27 G  Patient tolerance: patient tolerated the procedure well with no immediate complications  Additional procedure details: Using aseptic technique I injected 2 trigger points  Sarapin 4 ML and Lidocaine 1% 2 ML       Jeanne was seen today for injections.    Diagnoses and all orders for this visit:    Myofascial pain syndrome  -     Trigger Point Injection  -     sarapin injection 4 mL    Here for trigger point injections. Myofascial pain syndrome-pain trapezius areas as well as recurrent headaches. She is currently on Mg++ and Riboflavin.

## 2024-04-01 ENCOUNTER — APPOINTMENT (OUTPATIENT)
Dept: LAB | Facility: CLINIC | Age: 44
End: 2024-04-01
Payer: COMMERCIAL

## 2024-04-01 DIAGNOSIS — R51.9 CHRONIC NONINTRACTABLE HEADACHE, UNSPECIFIED HEADACHE TYPE: ICD-10-CM

## 2024-04-01 DIAGNOSIS — G89.29 CHRONIC NONINTRACTABLE HEADACHE, UNSPECIFIED HEADACHE TYPE: ICD-10-CM

## 2024-04-01 DIAGNOSIS — R90.89 ABNORMAL BRAIN MRI: ICD-10-CM

## 2024-04-01 LAB
ERYTHROCYTE [SEDIMENTATION RATE] IN BLOOD: 21 MM/HOUR (ref 0–19)
PROLACTIN SERPL-MCNC: 14.17 NG/ML (ref 3.34–26.72)

## 2024-04-01 PROCEDURE — 85652 RBC SED RATE AUTOMATED: CPT

## 2024-04-01 PROCEDURE — 84146 ASSAY OF PROLACTIN: CPT

## 2024-04-01 PROCEDURE — 36415 COLL VENOUS BLD VENIPUNCTURE: CPT

## 2024-04-09 ENCOUNTER — TELEPHONE (OUTPATIENT)
Dept: NEUROLOGY | Facility: CLINIC | Age: 44
End: 2024-04-09

## 2024-04-09 NOTE — TELEPHONE ENCOUNTER
Called pt to confirm upcoming appointment but mailbox was full. Pt stated if mailbox is full send her a email via [employer].

## 2024-04-19 ENCOUNTER — OFFICE VISIT (OUTPATIENT)
Dept: NEUROLOGY | Facility: CLINIC | Age: 44
End: 2024-04-19
Payer: COMMERCIAL

## 2024-04-19 VITALS
WEIGHT: 230 LBS | HEART RATE: 84 BPM | OXYGEN SATURATION: 94 % | DIASTOLIC BLOOD PRESSURE: 72 MMHG | SYSTOLIC BLOOD PRESSURE: 118 MMHG | BODY MASS INDEX: 31.19 KG/M2 | TEMPERATURE: 97.3 F

## 2024-04-19 DIAGNOSIS — R90.89 ABNORMAL FINDING ON MRI OF BRAIN: ICD-10-CM

## 2024-04-19 DIAGNOSIS — G89.29 CHRONIC NONINTRACTABLE HEADACHE, UNSPECIFIED HEADACHE TYPE: Primary | ICD-10-CM

## 2024-04-19 DIAGNOSIS — R51.9 CHRONIC NONINTRACTABLE HEADACHE, UNSPECIFIED HEADACHE TYPE: Primary | ICD-10-CM

## 2024-04-19 PROCEDURE — 99213 OFFICE O/P EST LOW 20 MIN: CPT

## 2024-04-19 NOTE — PATIENT INSTRUCTIONS
Patient Instructions:    Additional Testing:   Neurodiagnostic workup: Will look into repeating MRI brain pituitary with and without contrast after the next visit in the office.  Plan on seeing the patient in October 2024 and she will be due for another MRI of the brain in November 2024.  If everything appears stable on her MRI brain then we can certainly continue to monitor but may be hold off on an MRI for every 2 to 3 years after that.    Headache Calendar  Please maintain a headache calendar  Consider using phone applications such as Migraine Jb or Migraine Diary    Headache/migraine treatment:     Rescue medications (for immediate treatment of a headache):   It is ok to take ibuprofen, acetaminophen or naproxen (Advil, Tylenol,  Aleve, Excedrin) if they help your headaches you should limit these to No more than 3 times a week to avoid medication overuse/rebound headaches.       Over the counter preventive supplements for headaches/migraines (if you try, try for 3 months straight)  (to take every day to help prevent headaches - not to take at the time of headache):  There are combo pills online of these - none of which regulated by FDA and double check dosing - take appropriate dose only once a day- prevent a migraine, migravent, mind ease, migrelief   [x] Magnesium 400mg daily (If any diarrhea or upset stomach, decrease dose  as tolerated)  [x] Riboflavin (Vitamin B2) 400mg daily (may make your urine bright/neon yellow)     - All supplements can be purchased online    Lifestyle Recommendations:  [x] SLEEP - Maintain a regular sleep schedule: Adults need at least 7-8 hours of uninterrupted a night. Maintain good sleep hygiene:  Going to bed and waking up at consistent times, avoiding excessive daytime naps, avoiding caffeinated beverages in the evening, avoid excessive stimulation in the evening and generally using bed primarily for sleeping.  One hour before bedtime would recommend turning lights down lower,  decreasing your activity (may read quietly, listen to music at a low volume). When you get into bed, should eliminate all technology (no texting, emailing, playing with your phone, iPad or tablet in bed).  [x] HYDRATION - Maintain good hydration.  Drink  2L of fluid a day (4 typical small water bottles)  [x] DIET - Maintain good nutrition. In particular don't skip meals and try and eat healthy balanced meals regularly.  [x] TRIGGERS - Look for other triggers and avoid them: Limit caffeine to 1-2 cups a day or less. Avoid dietary triggers that you have noticed bring on your headaches (this could include aged cheese, peanuts, MSG, aspartame and nitrates).  [x] EXERCISE - physical exercise as we all know is good for you in many ways, and not only is good for your heart, but also is beneficial for your mental health, cognitive health and  chronic pain/headaches. I would encourage at the least 5 days of physical exercise weekly for at least 30 minutes.     Education and Follow-up  [x] Please call with any questions or concerns. Of course if any new concerning symptoms go to the emergency department.  [x] Follow up in 6 months with Mukul DAVID

## 2024-04-19 NOTE — PROGRESS NOTES
[facility]  PATIENT:  Jeanne Griffin  MRN:  [id number]  :  1980  DATE OF SERVICE:  2024      Assessment/Plan:     Chronic non-intractable headache:    I had the pleasure of seeing Jeanne today in the office at [facility] in [place].  She is presenting today for an office visit follow-up appointment in regard to headaches that she was previously experiencing.  Patient states that at this time she has been doing relatively well in regard to controlling her headaches.  She notes that when she started taking the riboflavin and magnesium supplementation this made a significant improvement in regard to her headaches.  She notes that if she keeps up with the supplements she will not have any headaches over the course of the month.  She notes that when she tries to come off of the supplements, she will tend to have more headaches.  Advised patient currently that is probably in her best interest to continue with the magnesium and riboflavin supplementation as prescribed.  She stated that recently her primary care provider had started performing trigger point injections into her left and right upper trapezius muscles.  Advised the patient that if this helps relieve some of the tension within her upper back and neck area that she could certainly continue with this regimen.  Advised the patient that at this moment in time her MRI of the brain pituitary with and without contrast is stable.  Advised the patient that I plan on seeing her in 2024, and would like to repeat an MRI of the brain in 2024 so we will likely place an order for imaging at the next appointment.  Outside of this, no other issues or concerns the patient can follow-up in 6 months time.      Patient Instructions:    Additional Testing:   Neurodiagnostic workup: Will look into repeating MRI brain pituitary with and without contrast after the next visit in the office.  Plan on seeing  the patient in October 2024 and she will be due for another MRI of the brain in November 2024.  If everything appears stable on her MRI brain then we can certainly continue to monitor but may be hold off on an MRI for every 2 to 3 years after that.    Headache Calendar  Please maintain a headache calendar  Consider using phone applications such as Migraine Jb or Migraine Diary    Headache/migraine treatment:     Rescue medications (for immediate treatment of a headache):   It is ok to take ibuprofen, acetaminophen or naproxen (Advil, Tylenol,  Aleve, Excedrin) if they help your headaches you should limit these to No more than 3 times a week to avoid medication overuse/rebound headaches.       Over the counter preventive supplements for headaches/migraines (if you try, try for 3 months straight)  (to take every day to help prevent headaches - not to take at the time of headache):  There are combo pills online of these - none of which regulated by FDA and double check dosing - take appropriate dose only once a day- prevent a migraine, migravent, mind ease, migrelief   [x] Magnesium 400mg daily (If any diarrhea or upset stomach, decrease dose  as tolerated)  [x] Riboflavin (Vitamin B2) 400mg daily (may make your urine bright/neon yellow)     - All supplements can be purchased online    Lifestyle Recommendations:  [x] SLEEP - Maintain a regular sleep schedule: Adults need at least 7-8 hours of uninterrupted a night. Maintain good sleep hygiene:  Going to bed and waking up at consistent times, avoiding excessive daytime naps, avoiding caffeinated beverages in the evening, avoid excessive stimulation in the evening and generally using bed primarily for sleeping.  One hour before bedtime would recommend turning lights down lower, decreasing your activity (may read quietly, listen to music at a low volume). When you get into bed, should eliminate all technology (no texting, emailing, playing with your phone, iPad or  tablet in bed).  [x] HYDRATION - Maintain good hydration.  Drink  2L of fluid a day (4 typical small water bottles)  [x] DIET - Maintain good nutrition. In particular don't skip meals and try and eat healthy balanced meals regularly.  [x] TRIGGERS - Look for other triggers and avoid them: Limit caffeine to 1-2 cups a day or less. Avoid dietary triggers that you have noticed bring on your headaches (this could include aged cheese, peanuts, MSG, aspartame and nitrates).  [x] EXERCISE - physical exercise as we all know is good for you in many ways, and not only is good for your heart, but also is beneficial for your mental health, cognitive health and  chronic pain/headaches. I would encourage at the least 5 days of physical exercise weekly for at least 30 minutes.     Education and Follow-up  [x] Please call with any questions or concerns. Of course if any new concerning symptoms go to the emergency department.  [x] Follow up in 6 months with Mukul DAVID        History of Present Illness:     For Review:    We had the pleasure of evaluating Jeanne Griffin in neurological follow up  today for headaches.  As you know,  she is a 43 y.o.   female with a past history of migraine headaches. Patient was last seen in the office at [facility] on 11/15/2023 by myself.  The patient previously presented as an initial new patient consultation in regard to chronic non-intractable headache.  It was noted that the patient was doing with this headache over the last few weeks.  She felt a daily headache/pressure type pain at the left side of her head and felt the pressure behind her left eye as well.  Patient also notices slight burning at the top of her head usually when the headache would be starting out.  It was noted that the patient did have this every day and the only thing that potentially seem to relieve the headache was standing up.  The patient noted that being in a sitting down position can certainly make  the headache/pressure worse.  Did not appear that the patient had any specific positional change headaches.  The patient did not note any significant early morning headaches.  I did not highly suspect a diagnosis of IIH like the patient's primary care provider was concerned for.  She did see an optometrist to have a dilated eye exam, and although I prefer that she see an ophthalmologist did not seem that there was any concern for papilledema to where she would have to see an ophthalmologist moving forward.  Wanted the patient to try magnesium and riboflavin supplementation for a daily preventative medication.  Advised the patient to stick with over-the-counter medication to treat headaches from an abortive standpoint but she should take no not take the medication no more than 2 or 3 days throughout the week.  We also addressed the patient's abnormal MRI findings.  Or suspicion for posterior pituitary bright spot versus Rathke's cleft cyst versus adenoma.  Overall, this change appeared to be stable since 2018.  There was also concern of an area at the pontomedullary junction which had increased since her last MRI in 2018.  Suggested to consider bleed be a cavernoma or capillary telangiectasia.  Advised the patient that I was going to consult neurosurgery in regards to the patient's findings.  We did an ambulatory E consult to neurosurgery and they did feel as though both changes were nonoperable, but she could certainly follow-up with neurosurgery in the office that she would like to establish care.        Current medical illnesses: thyromegaly, family history of brain aneurysm, sacroiliitis, vitamin D deficiency, situational anxiety, chronic non-intractable headache        What medications do you take or have you taken for your headaches?   Current Preventive:   Magnesium and Riboflavin  Current Abortive:   Tylenol, ibuprofen     Prior Preventive:   None  Prior Abortive:   None     Interval updates as of  4/19/2024:    The patient stated that she currently started trigger point injections into her right and left trapezius muscles at this time. She is receiving the trigger point injections from her primary care provider and they seem to be helping her at relieving some of the tension that she has.  The patient noted that she tried to slowly come off of the supplementation as she noticed this was helping for her headaches.  If the patient consistently takes natural supplementation with magnesium and riboflavin for her headaches she will not have any headaches.  She notes that every time she tries to come off of them she will experience headaches again.  Patient was concerned if she does need to actually come off of the supplements or not, and advised the patient that she could certainly stick with them for as long as she wanted to.    We discussed the E consultation with neurosurgery in regards to the patient's MRI findings.  Stated to the patient there was no immediate concern to follow-up with neurosurgery in the office at this time.  Patient stated that she was okay with not following up with neurosurgery but she wanted to make sure that we repeat her imaging in the future.  Stated to the patient that we could certainly look into repeating imaging on an annual basis.      Reviewed old notes from physician seen in the past- see above HPI for summary of previous encounters.       History reviewed. No pertinent past medical history.    Patient Active Problem List   Diagnosis    Family history of brain aneurysm    Thyromegaly    Vitamin D deficiency    Dense breast tissue on mammogram    Chronic nonintractable headache       Medications:      Current Outpatient Medications   Medication Sig Dispense Refill    fluticasone (FLONASE) 50 mcg/act nasal spray 1 spray into each nostril daily 9.9 mL 0     No current facility-administered medications for this visit.        Allergies:      Allergies   Allergen Reactions    Pollen  Extract        Family History:     Family History   Problem Relation Age of Onset    Breast cancer Mother 40    Stroke Father     Diabetes Father     Diabetes type II Father     No Known Problems Sister     No Known Problems Son     Ovarian cancer Maternal Aunt     No Known Problems Maternal Aunt     No Known Problems Maternal Aunt     No Known Problems Maternal Aunt     No Known Problems Maternal Aunt     No Known Problems Maternal Aunt     No Known Problems Paternal Aunt     No Known Problems Paternal Aunt        Social History:     Social History     Socioeconomic History    Marital status: Single     Spouse name: Not on file    Number of children: 2    Years of education: Not on file    Highest education level: Not on file   Occupational History    Occupation: Therapist   Tobacco Use    Smoking status: Never    Smokeless tobacco: Never   Vaping Use    Vaping status: Never Used   Substance and Sexual Activity    Alcohol use: Yes     Comment: social; denied per allscripts    Drug use: No    Sexual activity: Not on file   Other Topics Concern    Not on file   Social History Narrative    Lives with spouse and 2 children     Social Determinants of Health     Financial Resource Strain: Not on file   Food Insecurity: Not on file   Transportation Needs: Not on file   Physical Activity: Not on file   Stress: Not on file   Social Connections: Not on file   Intimate Partner Violence: Not on file   Housing Stability: Not on file         Objective:     Physical Exam:                                                                 Vitals:            Constitutional:    /72 (BP Location: Left arm, Patient Position: Sitting, Cuff Size: Adult)   Pulse 84   Temp (!) 97.3 °F (36.3 °C) (Temporal)   Wt 104 kg (230 lb)   SpO2 94%   BMI 31.19 kg/m²   BP Readings from Last 3 Encounters:   04/19/24 118/72   03/28/24 112/68   03/20/24 114/68     Pulse Readings from Last 3 Encounters:   04/19/24 84   03/28/24 64   03/20/24 76          Well developed, well nourished, well groomed. No dysmorphic features.       Psychiatric:  Normal behavior and appropriate affect        Neurological Examination:     Mental status/cognitive function:   Orientated to time, place and person. Recent and remote memory intact. Attention span and concentration as well as fund of knowledge are appropriate for age. Normal language and spontaneous speech.     Cranial Nerves:  II-visual fields full.   III, IV, VI-Pupils were equal, round, and reactive to light and accomodation. Extraocular movements were full and conjugate without nystagmus. Conjugate gaze, normal smooth pursuits, normal saccades   V-facial sensation symmetric.    VII-facial expression symmetric, intact forehead wrinkle, strong eye closure, symmetric smile    VIII-hearing grossly intact bilaterally   IX, X-palate elevation symmetric, no dysarthria.   XI-shoulder shrug strength intact    XII-tongue protrusion midline.    Motor Exam: symmetric bulk and tone throughout, no pronator drift. Power/strength 5/5 bilateral upper and lower extremities, no atrophy, fasciculations or abnormal movements noted.   Sensory: grossly intact light touch in all extremities.   Reflexes: brachioradialis 2+, biceps 2+, knee 2+ bilaterally  Coordination: Finger nose finger intact bilaterally, no apparent dysmetria, ataxia or tremor noted  Gait: steady casual and tandem gait.       Review of Systems:     Review of Systems   Constitutional:  Negative for appetite change, fatigue and fever.   HENT: Negative.  Negative for hearing loss, tinnitus, trouble swallowing and voice change.    Eyes: Negative.  Negative for photophobia, pain and visual disturbance.   Respiratory: Negative.  Negative for shortness of breath.    Cardiovascular: Negative.  Negative for palpitations.   Gastrointestinal: Negative.  Negative for nausea and vomiting.   Endocrine: Negative.  Negative for cold intolerance.   Genitourinary: Negative.  Negative for  dysuria, frequency and urgency.   Musculoskeletal:  Negative for back pain, gait problem, myalgias, neck pain and neck stiffness.   Skin: Negative.  Negative for rash.   Allergic/Immunologic: Negative.    Neurological:  Positive for headaches. Negative for dizziness, tremors, seizures, syncope, facial asymmetry, speech difficulty, weakness, light-headedness and numbness.   Hematological: Negative.  Does not bruise/bleed easily.   Psychiatric/Behavioral: Negative.  Negative for confusion, hallucinations and sleep disturbance.    All other systems reviewed and are negative.    I personally reviewed the ROS entered by the MA    I spent 20 minutes in total time for this visit.    Author:  Meliton Mcfarland PA-C 4/19/2024 9:31 AM

## 2024-04-29 ENCOUNTER — OFFICE VISIT (OUTPATIENT)
Dept: FAMILY MEDICINE CLINIC | Facility: CLINIC | Age: 44
End: 2024-04-29
Payer: COMMERCIAL

## 2024-04-29 VITALS
TEMPERATURE: 98 F | HEIGHT: 72 IN | HEART RATE: 64 BPM | SYSTOLIC BLOOD PRESSURE: 112 MMHG | RESPIRATION RATE: 16 BRPM | WEIGHT: 238 LBS | OXYGEN SATURATION: 99 % | BODY MASS INDEX: 32.23 KG/M2 | DIASTOLIC BLOOD PRESSURE: 70 MMHG

## 2024-04-29 DIAGNOSIS — M79.18 MYOFASCIAL PAIN SYNDROME: Primary | ICD-10-CM

## 2024-04-29 PROCEDURE — 96372 THER/PROPH/DIAG INJ SC/IM: CPT | Performed by: FAMILY MEDICINE

## 2024-04-29 PROCEDURE — 20553 NJX 1/MLT TRIGGER POINTS 3/>: CPT | Performed by: FAMILY MEDICINE

## 2024-04-29 NOTE — PROGRESS NOTES
Universal Protocol:  Consent: Verbal consent obtained.  Risks and benefits: risks, benefits and alternatives were discussed  Consent given by: patient  Supporting Documentation  Indications: pain (Myofascial pain syndrome)   Trigger Point Injections: multiple trigger points: 3 or more muscle groups    Injection site identified by: palpation  Procedure Details  Location(s):    Neck/Upper Back: L upper trapezius and R upper trapezius     Middle Back: L lower trapezius and R lower trapezius     Prep: patient was prepped and draped in usual sterile fashion  Needle size: 27 G  Patient tolerance: patient tolerated the procedure well with no immediate complications  Additional procedure details: Using aseptic technique I injected a total of 4 trigger points Sarapin 6 ML and Marcaine 0.75% 3 ML       Jeanne was seen today for follow-up.    Diagnoses and all orders for this visit:    Myofascial pain syndrome  -     Trigger Point Injection  -     sarapin injection 6 mL    Patient reports 6 days of improvement with last trigger point injections.  She had a follow-up with Neurology. Her headaches have been stable on Magnesium and Riboflavin supplement.    Continue with current supplements.  Follow-up office visit 4 weeks.  Consider other modalities such as chiropractic, massage therapy    Recent Results (from the past 840 hour(s))   Sedimentation rate, automated    Collection Time: 04/01/24  9:35 AM   Result Value Ref Range    Sed Rate 21 (H) 0 - 19 mm/hour   Prolactin    Collection Time: 04/01/24  9:35 AM   Result Value Ref Range    Prolactin 14.17 3.34 - 26.72 ng/mL

## 2024-05-31 ENCOUNTER — PROCEDURE VISIT (OUTPATIENT)
Dept: FAMILY MEDICINE CLINIC | Facility: CLINIC | Age: 44
End: 2024-05-31
Payer: COMMERCIAL

## 2024-05-31 VITALS
HEART RATE: 84 BPM | TEMPERATURE: 98 F | SYSTOLIC BLOOD PRESSURE: 110 MMHG | DIASTOLIC BLOOD PRESSURE: 72 MMHG | HEIGHT: 72 IN | WEIGHT: 238 LBS | OXYGEN SATURATION: 97 % | RESPIRATION RATE: 18 BRPM | BODY MASS INDEX: 32.23 KG/M2

## 2024-05-31 DIAGNOSIS — M79.18 MYOFASCIAL PAIN SYNDROME: Primary | ICD-10-CM

## 2024-05-31 PROCEDURE — 20552 NJX 1/MLT TRIGGER POINT 1/2: CPT | Performed by: FAMILY MEDICINE

## 2024-05-31 NOTE — PROGRESS NOTES
Universal Protocol:  Consent: Verbal consent obtained.  Risks and benefits: risks, benefits and alternatives were discussed  Consent given by: patient  Supporting Documentation  Indications: pain   Trigger Point Injections: single/multiple trigger point(s): 1-2 muscle groups    Injection site identified by: palpation  Procedure Details  Location(s):    Neck/Upper Back: L upper trapezius and R upper trapezius     Prep: patient was prepped and draped in usual sterile fashion  Needle size: 27 G  Patient tolerance: patient tolerated the procedure well with no immediate complications  Additional procedure details:  Using aseptic technique I injected 2 trigger points  Sarapin 4 ML and Lidocaine 1% 2 ML         Jeanne was seen today for follow-up and injections.    Diagnoses and all orders for this visit:    Myofascial pain syndrome-cervical  -     Ambulatory Referral to Chiropractic; Future  -     Trigger Point Injection  -     sarapin injection 4 mL    Symptomatic relief with trigger point injections at last visit.  She opted to stop magnesium/riboflavin supplements. No increase in headaches off supplements

## 2024-06-21 ENCOUNTER — OFFICE VISIT (OUTPATIENT)
Dept: URGENT CARE | Age: 44
End: 2024-06-21
Payer: COMMERCIAL

## 2024-06-21 VITALS
RESPIRATION RATE: 18 BRPM | OXYGEN SATURATION: 100 % | TEMPERATURE: 98.6 F | DIASTOLIC BLOOD PRESSURE: 88 MMHG | SYSTOLIC BLOOD PRESSURE: 120 MMHG | HEART RATE: 88 BPM

## 2024-06-21 DIAGNOSIS — J06.9 VIRAL URI WITH COUGH: Primary | ICD-10-CM

## 2024-06-21 PROCEDURE — 99213 OFFICE O/P EST LOW 20 MIN: CPT

## 2024-06-21 RX ORDER — OLOPATADINE HYDROCHLORIDE 1 MG/ML
1 SOLUTION/ DROPS OPHTHALMIC 2 TIMES DAILY
Qty: 5 ML | Refills: 0 | Status: SHIPPED | OUTPATIENT
Start: 2024-06-21

## 2024-06-21 NOTE — PROGRESS NOTES
[facility]        NAME: Jeanne Griffin is a 43 y.o. female  : 1980    MRN: [id number]  DATE: 2024  TIME: 8:43 AM    Assessment and Plan   Viral URI with cough [J06.9]  1. Viral URI with cough  olopatadine (PATANOL) 0.1 % ophthalmic solution      Use eye drops as needed for relief of eye irritation related to your congestion and upper respiratory symptoms.   Rest and increase fluids.  Nearly all of upper respiratory infections in adults are the result of viruses. These viruses include COVID, flu, RSV, adenoviruses and other coronaviruses. Antibiotics do not treat viruses and are not recommended or indicated at this time.   Take over the counter medications as needed.   Recommend over the counter decongestants as needed and age appropriate.   Neti Pot rinses and saline nasal sprays may also help clear nasal and/or sinus congestion. Frequent suctioning (before/after naps and nighttime sleep) can help symptoms in infants and young children  Recommend Mucinex to assist in the break-up of chest congestion in adults. Recommend Zarbee's cold over the counter treatments for young children (under the age of 2).   Also may consider over the counter allergy medications such as Allegra-D and Zyrtec.   If symptoms persist for 7+ days, follow-up with primary care provider or return to clinic to discuss appropriateness of antibiotics.   Vaccination is important to help prevent the spread of disease and infants. Vaccines are available for COVID and influenza for ages 6 months and older. Please discuss scheduling vaccines with your PCP.    If symptoms worsen, shortness of breath develops, you experience severe sinus pain, difficulty swallowing or changes in voice, report to the emergency department.    RSV: When It's More Than Just a Cold - [email or web address]        Patient Instructions       Follow up with PCP in 3-5 days.  Proceed to  ER if symptoms worsen.    If tests have been performed at [facility],  our office will contact you with results if changes need to be made to the care plan discussed with you at the visit.  You can review your full results on [facility].    Chief Complaint     Chief Complaint   Patient presents with   • Generalized Body Aches   • Headache   • Cough   • Cold Like Symptoms     Patient been sick for 24 hours with sinus pressure, headache and stats she is light sensitive, nasal congestion, and cough         History of Present Illness       Pt is a 43 year old female presenting with 2 days of cough, congestion, runny nose, sneezing, and headaches.  She reports taking sudafed which made her feel worse.  She denies fever or chills, SOB, CP, abd pain, n/v/d.  Reports  toddler at home attends  and is currently experiencing the same symptoms.      Generalized Body Aches  Associated symptoms include congestion, headaches, rhinorrhea and coughing. Pertinent negatives include no sore throat, fever, chest pain, shortness of breath, wheezing or abdominal pain.   Headache  Cough  Associated symptoms include headaches, postnasal drip and rhinorrhea. Pertinent negatives include no chest pain, chills, fever, sore throat, shortness of breath or wheezing.       Review of Systems   Review of Systems   Constitutional:  Negative for chills and fever.   HENT:  Positive for congestion, postnasal drip, rhinorrhea and sneezing. Negative for sore throat, tinnitus and trouble swallowing.    Respiratory:  Positive for cough. Negative for chest tightness, shortness of breath and wheezing.    Cardiovascular:  Negative for chest pain.   Gastrointestinal:  Negative for abdominal pain.   Neurological:  Positive for headaches.         Current Medications       Current Outpatient Medications:   •  olopatadine (PATANOL) 0.1 % ophthalmic solution, Administer 1 drop to both eyes 2 (two) times a day, Disp: 5 mL, Rfl: 0  •  fluticasone (FLONASE) 50 mcg/act nasal spray, 1 spray into each nostril daily, Disp: 9.9  mL, Rfl: 0    Current Allergies     Allergies as of 06/21/2024 - Reviewed 06/21/2024   Allergen Reaction Noted   • Pollen extract  06/23/2015            The following portions of the patient's history were reviewed and updated as appropriate: allergies, current medications, past family history, past medical history, past social history, past surgical history and problem list.     History reviewed. No pertinent past medical history.    Past Surgical History:   Procedure Laterality Date   • VAGINAL DELIVERY         Family History   Problem Relation Age of Onset   • Breast cancer Mother 40   • Stroke Mother    • Diabetes type II Father    • No Known Problems Sister    • No Known Problems Son    • Ovarian cancer Maternal Aunt    • No Known Problems Maternal Aunt    • No Known Problems Maternal Aunt    • No Known Problems Maternal Aunt    • No Known Problems Maternal Aunt    • No Known Problems Maternal Aunt    • No Known Problems Paternal Aunt    • No Known Problems Paternal Aunt          Medications have been verified.        Objective   /88 (BP Location: Right arm, Patient Position: Sitting, Cuff Size: Large)   Pulse 88   Temp 98.6 °F (37 °C)   Resp 18   SpO2 100%   No LMP recorded.       Physical Exam     Physical Exam  Vitals and nursing note reviewed.   Constitutional:       General: She is not in acute distress.     Appearance: Normal appearance. She is normal weight.   HENT:      Head: Normocephalic and atraumatic.      Right Ear: Tympanic membrane normal.      Left Ear: Tympanic membrane normal.      Nose: Congestion and rhinorrhea present.      Mouth/Throat:      Mouth: Mucous membranes are moist.      Pharynx: Oropharynx is clear. Posterior oropharyngeal erythema present.   Eyes:      General: Lids are normal. Lids are everted, no foreign bodies appreciated. Vision grossly intact. No visual field deficit.        Right eye: No discharge.         Left eye: No discharge.      Extraocular Movements:  Extraocular movements intact.      Right eye: Normal extraocular motion and no nystagmus.      Left eye: Normal extraocular motion and no nystagmus.      Conjunctiva/sclera:      Right eye: Right conjunctiva is injected. Chemosis present.      Left eye: Left conjunctiva is injected. Chemosis present.   Cardiovascular:      Rate and Rhythm: Normal rate and regular rhythm.      Pulses: Normal pulses.      Heart sounds: Normal heart sounds.   Pulmonary:      Effort: Pulmonary effort is normal. No respiratory distress.      Breath sounds: Normal breath sounds. No stridor. No wheezing, rhonchi or rales.   Chest:      Chest wall: No tenderness.   Abdominal:      General: Abdomen is flat. Bowel sounds are normal.      Palpations: Abdomen is soft.   Musculoskeletal:         General: Normal range of motion.      Cervical back: Normal range of motion.   Skin:     General: Skin is warm and dry.      Capillary Refill: Capillary refill takes less than 2 seconds.   Neurological:      Mental Status: She is alert.

## 2024-06-21 NOTE — LETTER
June 21, 2024     Patient: Jeanne Griffin   YOB: 1980   Date of Visit: 6/21/2024       To Whom it May Concern:    Jeanne Griffin was seen in my clinic on 6/21/2024. She may return to work on 6/22/2024 .    If you have any questions or concerns, please don't hesitate to call.         Sincerely,          SANDY Jones        CC: No Recipients

## 2024-06-21 NOTE — PATIENT INSTRUCTIONS
Use eye drops as needed for relief of eye irritation related to your congestion and upper respiratory symptoms.   Rest and increase fluids.  Nearly all of upper respiratory infections in adults are the result of viruses. These viruses include COVID, flu, RSV, adenoviruses and other coronaviruses. Antibiotics do not treat viruses and are not recommended or indicated at this time.   Take over the counter medications as needed.   Recommend over the counter decongestants as needed and age appropriate.   Neti Pot rinses and saline nasal sprays may also help clear nasal and/or sinus congestion. Frequent suctioning (before/after naps and nighttime sleep) can help symptoms in infants and young children  Recommend Mucinex to assist in the break-up of chest congestion in adults. Recommend Zarbee's cold over the counter treatments for young children (under the age of 2).   Also may consider over the counter allergy medications such as Allegra-D and Zyrtec.   If symptoms persist for 7+ days, follow-up with primary care provider or return to clinic to discuss appropriateness of antibiotics.   Vaccination is important to help prevent the spread of disease and infants. Vaccines are available for COVID and influenza for ages 6 months and older. Please discuss scheduling vaccines with your PCP.    If symptoms worsen, shortness of breath develops, you experience severe sinus pain, difficulty swallowing or changes in voice, report to the emergency department.    RSV: When It's More Than Just a Cold - [email or web address]

## 2024-06-28 ENCOUNTER — OFFICE VISIT (OUTPATIENT)
Age: 44
End: 2024-06-28
Payer: COMMERCIAL

## 2024-06-28 VITALS
BODY MASS INDEX: 32.23 KG/M2 | HEART RATE: 81 BPM | WEIGHT: 238 LBS | SYSTOLIC BLOOD PRESSURE: 110 MMHG | HEIGHT: 72 IN | DIASTOLIC BLOOD PRESSURE: 83 MMHG

## 2024-06-28 DIAGNOSIS — G44.86 CERVICOGENIC HEADACHE: ICD-10-CM

## 2024-06-28 DIAGNOSIS — M54.2 NECK PAIN: ICD-10-CM

## 2024-06-28 DIAGNOSIS — M79.18 MYOFASCIAL PAIN: Primary | ICD-10-CM

## 2024-06-28 PROCEDURE — 97110 THERAPEUTIC EXERCISES: CPT | Performed by: CHIROPRACTOR

## 2024-06-28 PROCEDURE — 98940 CHIROPRACT MANJ 1-2 REGIONS: CPT | Performed by: CHIROPRACTOR

## 2024-06-28 PROCEDURE — 99203 OFFICE O/P NEW LOW 30 MIN: CPT | Performed by: CHIROPRACTOR

## 2024-06-28 NOTE — PROGRESS NOTES
HPI:  43-year-old right-hand-dominant female presents for evaluation of possible treatment at the request of Dr. Arcos.  She describes a history of neck and upper back pain and subsequent significant headache syndrome.  She has had extensive workup but has found that the combination of vitamin B2 along with magnesium and concurrent trigger point injections have provided the best and most lasting relief.  Reports neck pain without significant radicular symptoms denies any numbness or tingling denies any weakness.  Reports no issues associated yellow flags.      Jeanne Griffin is a 43 y.o. female who presents for evaluation of possible treatment at the request of her PCP Dr. Arcos.  She describes a history dating back to the fall of last year where she had a rather sharp episode of neck pain focused in the cervical thoracic region pain was sharp and intense on twisting and turning of the head she denies any radicular complaints at that time.  Shortly after the onset of neck pain she developed severe headaches.  In the ensuing time.  She would have an extensive workup for ongoing headaches in both neurology as well as family practice.  It was found that the combination of B2 and magnesium taken helped significantly reduce intensity and frequency of headaches but trigger point injections by Dr. Arcos gave her the most significant relief.    Today she localizes neck pain paracervical spine upper thoracic spine without distal radiation.  She denies any numbness or tingling.  She denies any weakness.  She denies any nausea or vomiting associated with her headaches reports no predermal sensations.  She will however note photophobia during an acute headache event.    She reports no regular exercise program    Pain is listed currently as a 5 on a pain scale however 8-9 during an acute episode.  Chief Complaint   Patient presents with   • Neck - Pain     Neck pain that radiates down both shoulders and into upper head  that causes headaches.     Pain score 5      Flare pain score 8-9        History reviewed. No pertinent past medical history.   Past Surgical History:   Procedure Laterality Date   • VAGINAL DELIVERY       Family History   Problem Relation Age of Onset   • Breast cancer Mother 40   • Stroke Mother    • Diabetes type II Father    • No Known Problems Sister    • No Known Problems Son    • Ovarian cancer Maternal Aunt    • No Known Problems Maternal Aunt    • No Known Problems Maternal Aunt    • No Known Problems Maternal Aunt    • No Known Problems Maternal Aunt    • No Known Problems Maternal Aunt    • No Known Problems Paternal Aunt    • No Known Problems Paternal Aunt      Social History     Socioeconomic History   • Marital status: Single     Spouse name: None   • Number of children: 2   • Years of education: None   • Highest education level: None   Occupational History   • Occupation: Therapist   Tobacco Use   • Smoking status: Never   • Smokeless tobacco: Never   Vaping Use   • Vaping status: Never Used   Substance and Sexual Activity   • Alcohol use: Not Currently     Comment: social; denied per allscripts   • Drug use: No   • Sexual activity: Not Currently     Partners: Male     Birth control/protection: Condom Male   Other Topics Concern   • None   Social History Narrative    Lives with spouse and 2 children     Social Determinants of Health     Financial Resource Strain: Not on file   Food Insecurity: Not on file   Transportation Needs: Not on file   Physical Activity: Not on file   Stress: Not on file   Social Connections: Not on file   Intimate Partner Violence: Not on file   Housing Stability: Not on file       Current Outpatient Medications:   •  fluticasone (FLONASE) 50 mcg/act nasal spray, 1 spray into each nostril daily, Disp: 9.9 mL, Rfl: 0  •  olopatadine (PATANOL) 0.1 % ophthalmic solution, Administer 1 drop to both eyes 2 (two) times a day, Disp: 5 mL, Rfl: 0  Allergies as of 06/28/2024 - Reviewed  06/28/2024   Allergen Reaction Noted   • Pollen extract  06/23/2015         The following portions of the patient's history were reviewed and updated as appropriate: allergies, current medications, past family history, past medical history, past social history, past surgical history, and problem list.    Review of Systems   Constitutional: Negative.    Musculoskeletal:  Positive for myalgias, neck pain and neck stiffness.   Neurological:  Positive for headaches.   Psychiatric/Behavioral: Negative.         Physical Exam:  Physical Exam  Vitals reviewed.   Constitutional:       Appearance: Normal appearance.   Cardiovascular:      Rate and Rhythm: Normal rate.      Pulses: Normal pulses.   Pulmonary:      Effort: Pulmonary effort is normal.   Musculoskeletal:      Cervical back: Spasms and tenderness present. Pain with movement present. Decreased range of motion.        Back:    Neurological:      General: No focal deficit present.      Mental Status: She is alert and oriented to person, place, and time.      Sensory: Sensation is intact.      Motor: Motor function is intact.      Deep Tendon Reflexes: Reflexes are normal and symmetric.   Psychiatric:         Mood and Affect: Mood normal.         Behavior: Behavior normal.         Thought Content: Thought content normal.         Assessment:  Diagnoses and all orders for this visit:    Myofascial pain    Neck pain  -     Ambulatory Referral to Chiropractic    Cervicogenic headache         Treatment:  34471  Manipulation to the C5-C6 C6-C7 levels via seated stairstepping technique no HVLA.    Therapeutic stretching performed to the bilateral shoulder girdles as well as addressing the suboccipital region.  I used Graston technique.  I used GT 2, GT 3, and GT for instruments.  Scapulothoracic mobilizations performed and well-tolerated.  This was a 12-minute procedure.    Discussion:  I reviewed past medical notes including extensive notes from neurology.  I reviewed past  diagnostics.  Discussed case with patient today trial of conservative care reassessment 4 weeks.  No follow-ups on file.

## 2024-07-16 ENCOUNTER — PROCEDURE VISIT (OUTPATIENT)
Age: 44
End: 2024-07-16
Payer: COMMERCIAL

## 2024-07-16 VITALS — HEIGHT: 72 IN | WEIGHT: 238 LBS | BODY MASS INDEX: 32.23 KG/M2

## 2024-07-16 DIAGNOSIS — G44.86 CERVICOGENIC HEADACHE: ICD-10-CM

## 2024-07-16 DIAGNOSIS — M79.18 MYOFASCIAL PAIN: ICD-10-CM

## 2024-07-16 DIAGNOSIS — M54.2 NECK PAIN: Primary | ICD-10-CM

## 2024-07-16 PROCEDURE — 97110 THERAPEUTIC EXERCISES: CPT | Performed by: CHIROPRACTOR

## 2024-07-16 PROCEDURE — 98940 CHIROPRACT MANJ 1-2 REGIONS: CPT | Performed by: CHIROPRACTOR

## 2024-07-17 NOTE — PROGRESS NOTES
HPI:  Jeanne returns for treatment of ongoing neck pain and stiffness left side greater than right.  Has had headaches as well.      The following portions of the patient's history were reviewed and updated as appropriate: allergies, current medications, past family history, past medical history, past social history, past surgical history, and problem list.    Review of Systems    Physical Exam:  Exam reveals paracervical spasm stiffness but all palpation decreased cervical range of motion and endrange rotation bilaterally right lateral bending joint dysfunction C1-C2 C5-C6.  Scap thoracic dysfunction noted on the left.    Assessment:   Diagnosis ICD-10-CM Associated Orders   1. Neck pain  M54.2       2. Myofascial pain  M79.18       3. Cervicogenic headache  G44.86                 Treatment: 02254  Manipulation C5 C1 via seated stairstepping technique well-tolerated.    Therapeutic stretching applied to the bilateral shoulder girdles.  I used Graston technique.  I used GT 2, GT 3, and GT for instruments.  Bilateral Scap thoracic junctions addressed and mobilized.  Increasing shoulder range of motion noted posttreatment.  12-minute procedure.    Discussion:  Reviewed home stretching see her back for follow-up.

## 2024-07-26 ENCOUNTER — PROCEDURE VISIT (OUTPATIENT)
Age: 44
End: 2024-07-26
Payer: COMMERCIAL

## 2024-07-26 VITALS
HEART RATE: 83 BPM | DIASTOLIC BLOOD PRESSURE: 80 MMHG | HEIGHT: 72 IN | WEIGHT: 238 LBS | SYSTOLIC BLOOD PRESSURE: 113 MMHG | BODY MASS INDEX: 32.23 KG/M2

## 2024-07-26 DIAGNOSIS — M54.2 NECK PAIN: Primary | ICD-10-CM

## 2024-07-26 DIAGNOSIS — G44.86 CERVICOGENIC HEADACHE: ICD-10-CM

## 2024-07-26 DIAGNOSIS — M79.18 MYOFASCIAL PAIN: ICD-10-CM

## 2024-07-26 PROCEDURE — 97110 THERAPEUTIC EXERCISES: CPT | Performed by: CHIROPRACTOR

## 2024-07-26 PROCEDURE — 98940 CHIROPRACT MANJ 1-2 REGIONS: CPT | Performed by: CHIROPRACTOR

## 2024-07-26 NOTE — PROGRESS NOTES
HPI:  Jeanne returns for treatment of ongoing neck pain and stiffness left side greater than right.  Tightness increase while working on her computer.      The following portions of the patient's history were reviewed and updated as appropriate: allergies, current medications, past family history, past medical history, past social history, past surgical history, and problem list.    Review of Systems    Physical Exam:  Exam reveals paracervical spasm stiffness but all palpation decreased cervical range of motion and endrange rotation bilaterally right lateral bending joint dysfunction C1-C2 C5-C6.  Scap thoracic dysfunction noted on the left.    Assessment:   Diagnosis ICD-10-CM Associated Orders   1. Neck pain  M54.2       2. Myofascial pain  M79.18       3. Cervicogenic headache  G44.86                 Treatment: 21478  Manipulation C5 C1 via seated stairstepping technique well-tolerated.    Therapeutic stretching applied to the bilateral shoulder girdles.  I used Graston technique.  I used GT 2, GT 3, and GT for instruments.  Bilateral Scap thoracic junctions addressed and mobilized.  Increasing shoulder range of motion noted posttreatment.  12-minute procedure.    Discussion:  Reviewed home stretching see her back for follow-up.  Discussed workplace organ metrics I reviewed various postures.  I would recommend a sit/stand desk if at all possible in her workplace.

## 2024-07-26 NOTE — LETTER
RE: Jeanne Griffin  MRN: [id number]    The above-named patient is being followed in my office for ongoing neck and upper back myofascial complaints.  She is currently undergoing active treatment for cervical and thoracic spasm and reduced facet joint range of motion.  As these conditions are sensitive to posture positions I am currently recommending a sit/stand desk to be used in the workplace.    Any further questions please feel free to contact me.    Sincerely,      Brayan Archuleta DC

## 2024-08-02 ENCOUNTER — PROCEDURE VISIT (OUTPATIENT)
Age: 44
End: 2024-08-02
Payer: COMMERCIAL

## 2024-08-02 VITALS
DIASTOLIC BLOOD PRESSURE: 74 MMHG | BODY MASS INDEX: 32.23 KG/M2 | HEIGHT: 72 IN | HEART RATE: 78 BPM | SYSTOLIC BLOOD PRESSURE: 111 MMHG | WEIGHT: 238 LBS

## 2024-08-02 DIAGNOSIS — M54.2 NECK PAIN: Primary | ICD-10-CM

## 2024-08-02 DIAGNOSIS — M79.18 MYOFASCIAL PAIN: ICD-10-CM

## 2024-08-02 DIAGNOSIS — G44.86 CERVICOGENIC HEADACHE: ICD-10-CM

## 2024-08-02 PROCEDURE — 97110 THERAPEUTIC EXERCISES: CPT | Performed by: CHIROPRACTOR

## 2024-08-02 PROCEDURE — 98940 CHIROPRACT MANJ 1-2 REGIONS: CPT | Performed by: CHIROPRACTOR

## 2024-08-02 NOTE — PROGRESS NOTES
HPI:  Jeanne returns for treatment of ongoing neck pain and stiffness left side greater than right.  Slight tightness today no pain.      The following portions of the patient's history were reviewed and updated as appropriate: allergies, current medications, past family history, past medical history, past social history, past surgical history, and problem list.    Review of Systems    Physical Exam:  Exam reveals paracervical spasm stiffness but all palpation decreased cervical range of motion and endrange rotation bilaterally right lateral bending joint dysfunction C1-C2 C5-C6.  Scap thoracic dysfunction noted on the left.    Assessment:   Diagnosis ICD-10-CM Associated Orders   1. Neck pain  M54.2       2. Myofascial pain  M79.18       3. Cervicogenic headache  G44.86                 Treatment: 94362  Manipulation C5 C1 via seated stairstepping technique well-tolerated.    Therapeutic stretching applied to the bilateral shoulder girdles.  I used Graston technique.  I used GT 2, GT 3, and GT for instruments.  Bilateral Scap thoracic junctions addressed and mobilized.  Increasing shoulder range of motion noted posttreatment.  12-minute procedure.    Discussion:  Reviewed home stretching see her back for follow-up.  Discussed workplace organ metrics I reviewed various postures.  I would recommend a sit/stand desk if at all possible in her workplace.

## 2024-08-16 ENCOUNTER — PROCEDURE VISIT (OUTPATIENT)
Age: 44
End: 2024-08-16
Payer: COMMERCIAL

## 2024-08-16 VITALS
BODY MASS INDEX: 32.23 KG/M2 | DIASTOLIC BLOOD PRESSURE: 81 MMHG | SYSTOLIC BLOOD PRESSURE: 116 MMHG | WEIGHT: 238 LBS | HEART RATE: 82 BPM | HEIGHT: 72 IN

## 2024-08-16 DIAGNOSIS — M79.18 MYOFASCIAL PAIN: ICD-10-CM

## 2024-08-16 DIAGNOSIS — G44.86 CERVICOGENIC HEADACHE: ICD-10-CM

## 2024-08-16 DIAGNOSIS — M54.2 NECK PAIN: Primary | ICD-10-CM

## 2024-08-16 PROCEDURE — 97110 THERAPEUTIC EXERCISES: CPT | Performed by: CHIROPRACTOR

## 2024-08-16 PROCEDURE — 98940 CHIROPRACT MANJ 1-2 REGIONS: CPT | Performed by: CHIROPRACTOR

## 2024-08-16 NOTE — PROGRESS NOTES
HPI:  Jeanne returns for treatment of ongoing neck pain and stiffness is very sore high levels of stress increased headaches 8 on a pain scale.    The following portions of the patient's history were reviewed and updated as appropriate: allergies, current medications, past family history, past medical history, past social history, past surgical history, and problem list.    Review of Systems    Physical Exam:  Exam reveals paracervical spasm stiffness but all palpation decreased cervical range of motion and endrange rotation bilaterally right lateral bending joint dysfunction C1-C2 C5-C6.  Scap thoracic dysfunction noted on the left.    Assessment:   Diagnosis ICD-10-CM Associated Orders   1. Neck pain  M54.2       2. Myofascial pain  M79.18       3. Cervicogenic headache  G44.86                 Treatment: 78439  Manipulation C5 C1 via seated stairstepping technique well-tolerated.    Therapeutic stretching applied to the bilateral shoulder girdles.  I used Graston technique.  I used GT 2, GT 3, and GT for instruments.  Bilateral Scap thoracic junctions addressed and mobilized.  Increasing shoulder range of motion noted posttreatment.  12-minute procedure.    Discussion:  Reviewed home stretching see her back for follow-up.  Discussed workplace organ metrics I reviewed various postures.  I would recommend a sit/stand desk if at all possible in her workplace.

## 2024-09-20 ENCOUNTER — PROCEDURE VISIT (OUTPATIENT)
Age: 44
End: 2024-09-20
Payer: COMMERCIAL

## 2024-09-20 VITALS
DIASTOLIC BLOOD PRESSURE: 77 MMHG | BODY MASS INDEX: 32.23 KG/M2 | SYSTOLIC BLOOD PRESSURE: 125 MMHG | HEIGHT: 72 IN | WEIGHT: 238 LBS | HEART RATE: 76 BPM

## 2024-09-20 DIAGNOSIS — M54.2 NECK PAIN: Primary | ICD-10-CM

## 2024-09-20 DIAGNOSIS — M79.18 MYOFASCIAL PAIN: ICD-10-CM

## 2024-09-20 DIAGNOSIS — G44.86 CERVICOGENIC HEADACHE: ICD-10-CM

## 2024-09-20 PROCEDURE — 97110 THERAPEUTIC EXERCISES: CPT | Performed by: CHIROPRACTOR

## 2024-09-20 PROCEDURE — 98940 CHIROPRACT MANJ 1-2 REGIONS: CPT | Performed by: CHIROPRACTOR

## 2024-09-20 NOTE — PROGRESS NOTES
HPI:  Jeanne returns for treatment of ongoing neck pain and stiffness.  Still has a good deal of tightness.      7 on pain scale.    The following portions of the patient's history were reviewed and updated as appropriate: allergies, current medications, past family history, past medical history, past social history, past surgical history, and problem list.    Review of Systems    Physical Exam:  Exam reveals paracervical spasm stiffness but all palpation decreased cervical range of motion and endrange rotation bilaterally right lateral bending joint dysfunction C1-C2 C5-C6.  Scap thoracic dysfunction noted on the left.    Assessment:   Diagnosis ICD-10-CM Associated Orders   1. Neck pain  M54.2       2. Myofascial pain  M79.18       3. Cervicogenic headache  G44.86                 Treatment: 08811  Manipulation C5 C1 via seated stairstepping technique well-tolerated.    Therapeutic stretching applied to the bilateral shoulder girdles.  I used Graston technique.  I used GT 2, GT 3, and GT for instruments.  Bilateral Scap thoracic junctions addressed and mobilized.  Increasing shoulder range of motion noted posttreatment.  12-minute procedure.    Discussion:  Reviewed home stretching see her back for follow-up.    I would recommend a sit/stand desk if at all possible in her workplace.

## 2024-10-11 ENCOUNTER — PROCEDURE VISIT (OUTPATIENT)
Age: 44
End: 2024-10-11
Payer: COMMERCIAL

## 2024-10-11 VITALS
HEART RATE: 78 BPM | BODY MASS INDEX: 32.23 KG/M2 | SYSTOLIC BLOOD PRESSURE: 130 MMHG | WEIGHT: 238 LBS | DIASTOLIC BLOOD PRESSURE: 84 MMHG | HEIGHT: 72 IN

## 2024-10-11 DIAGNOSIS — M79.18 MYOFASCIAL PAIN: ICD-10-CM

## 2024-10-11 DIAGNOSIS — G44.86 CERVICOGENIC HEADACHE: ICD-10-CM

## 2024-10-11 DIAGNOSIS — M54.2 NECK PAIN: Primary | ICD-10-CM

## 2024-10-11 PROCEDURE — 97110 THERAPEUTIC EXERCISES: CPT | Performed by: CHIROPRACTOR

## 2024-10-11 PROCEDURE — 98940 CHIROPRACT MANJ 1-2 REGIONS: CPT | Performed by: CHIROPRACTOR

## 2024-10-11 NOTE — PROGRESS NOTES
HPI:  Jeanne returns for treatment of ongoing neck pain and stiffness.  Improved.      The following portions of the patient's history were reviewed and updated as appropriate: allergies, current medications, past family history, past medical history, past social history, past surgical history, and problem list.    Review of Systems    Physical Exam:  Exam reveals paracervical spasm stiffness but all palpation decreased cervical range of motion and endrange rotation bilaterally right lateral bending joint dysfunction C1-C2 C5-C6.  Scap thoracic dysfunction noted on the left.    Assessment:   Diagnosis ICD-10-CM Associated Orders   1. Neck pain  M54.2       2. Myofascial pain  M79.18       3. Cervicogenic headache  G44.86                 Treatment: 66534  Manipulation C5 C1 via seated stairstepping technique well-tolerated.    Therapeutic stretching applied to the bilateral shoulder girdles.  I used Graston technique.  I used GT 2, GT 3, and GT for instruments.  Bilateral Scap thoracic junctions addressed and mobilized.  Increasing shoulder range of motion noted posttreatment.  12-minute procedure.    Discussion:  Reviewed home stretching see her back for follow-up.    I would recommend a sit/stand desk if at all possible in her workplace.

## 2024-10-25 ENCOUNTER — PROCEDURE VISIT (OUTPATIENT)
Age: 44
End: 2024-10-25
Payer: COMMERCIAL

## 2024-10-25 VITALS
WEIGHT: 238 LBS | DIASTOLIC BLOOD PRESSURE: 79 MMHG | SYSTOLIC BLOOD PRESSURE: 118 MMHG | HEIGHT: 72 IN | BODY MASS INDEX: 32.23 KG/M2 | HEART RATE: 78 BPM

## 2024-10-25 DIAGNOSIS — M54.2 NECK PAIN: Primary | ICD-10-CM

## 2024-10-25 DIAGNOSIS — G44.86 CERVICOGENIC HEADACHE: ICD-10-CM

## 2024-10-25 DIAGNOSIS — M79.18 MYOFASCIAL PAIN: ICD-10-CM

## 2024-10-25 PROCEDURE — 98940 CHIROPRACT MANJ 1-2 REGIONS: CPT | Performed by: CHIROPRACTOR

## 2024-10-25 PROCEDURE — 97110 THERAPEUTIC EXERCISES: CPT | Performed by: CHIROPRACTOR

## 2024-10-28 NOTE — PROGRESS NOTES
HPI:  Jeanne returns for treatment of ongoing neck pain and stiffness.  Improved.      The following portions of the patient's history were reviewed and updated as appropriate: allergies, current medications, past family history, past medical history, past social history, past surgical history, and problem list.    Review of Systems    Physical Exam:  Exam reveals paracervical spasm stiffness but all palpation decreased cervical range of motion and endrange rotation bilaterally right lateral bending joint dysfunction C1-C2 C5-C6.  Scap thoracic dysfunction noted on the left.    Assessment:   Diagnosis ICD-10-CM Associated Orders   1. Neck pain  M54.2       2. Myofascial pain  M79.18       3. Cervicogenic headache  G44.86                 Treatment: 25506  Manipulation C5 C1 via seated stairstepping technique well-tolerated.    Therapeutic stretching applied to the bilateral shoulder girdles.  I used Graston technique.  I used GT 2, GT 3, and GT for instruments.  Bilateral Scap thoracic junctions addressed and mobilized.  Increasing shoulder range of motion noted posttreatment.  12-minute procedure.    Discussion:  Reviewed home stretching see her back for follow-up.    I would recommend a sit/stand desk if at all possible in her workplace.

## 2024-11-08 ENCOUNTER — PROCEDURE VISIT (OUTPATIENT)
Age: 44
End: 2024-11-08
Payer: COMMERCIAL

## 2024-11-08 VITALS — WEIGHT: 238 LBS | BODY MASS INDEX: 32.23 KG/M2 | HEIGHT: 72 IN

## 2024-11-08 DIAGNOSIS — M54.2 NECK PAIN: Primary | ICD-10-CM

## 2024-11-08 DIAGNOSIS — M79.18 MYOFASCIAL PAIN: ICD-10-CM

## 2024-11-08 DIAGNOSIS — G44.86 CERVICOGENIC HEADACHE: ICD-10-CM

## 2024-11-08 PROCEDURE — 98940 CHIROPRACT MANJ 1-2 REGIONS: CPT | Performed by: CHIROPRACTOR

## 2024-11-10 NOTE — PROGRESS NOTES
HPI:  Jeanne returns for treatment of ongoing neck pain and stiffness.  Reports exacerbation of symptomatology today secondary to some stress and not stretching as much.  7 on a pain scale.  Denies radicular complaints.      The following portions of the patient's history were reviewed and updated as appropriate: allergies, current medications, past family history, past medical history, past social history, past surgical history, and problem list.    Review of Systems    Physical Exam:  Exam reveals paracervical spasm stiffness but all palpation decreased cervical range of motion and endrange rotation bilaterally right lateral bending joint dysfunction C1-C2 C5-C6.  Scap thoracic dysfunction noted on the left.    Assessment:   Diagnosis ICD-10-CM Associated Orders   1. Neck pain  M54.2       2. Myofascial pain  M79.18       3. Cervicogenic headache  G44.86                 Treatment: 33670  Manipulation C5 C1 via seated stairstepping technique well-tolerated.    Therapeutic stretching applied to the bilateral shoulder girdles.  I used Graston technique.  I used GT 2, GT 3, and GT for instruments.  Bilateral Scap thoracic junctions addressed and mobilized.  Increasing shoulder range of motion noted posttreatment.  12-minute procedure.    Discussion:  Reviewed home stretching see her back for follow-up.    I would recommend a sit/stand desk if at all possible in her workplace.

## 2024-12-10 ENCOUNTER — OFFICE VISIT (OUTPATIENT)
Dept: FAMILY MEDICINE CLINIC | Facility: CLINIC | Age: 44
End: 2024-12-10
Payer: COMMERCIAL

## 2024-12-10 VITALS
HEIGHT: 72 IN | WEIGHT: 246 LBS | HEART RATE: 69 BPM | TEMPERATURE: 98.1 F | DIASTOLIC BLOOD PRESSURE: 74 MMHG | OXYGEN SATURATION: 98 % | SYSTOLIC BLOOD PRESSURE: 120 MMHG | RESPIRATION RATE: 18 BRPM | BODY MASS INDEX: 33.32 KG/M2

## 2024-12-10 DIAGNOSIS — G44.86 CERVICOGENIC HEADACHE: ICD-10-CM

## 2024-12-10 DIAGNOSIS — Z12.11 SCREENING FOR COLON CANCER: ICD-10-CM

## 2024-12-10 DIAGNOSIS — M17.12 PRIMARY OSTEOARTHRITIS OF LEFT KNEE: Primary | ICD-10-CM

## 2024-12-10 PROCEDURE — 99213 OFFICE O/P EST LOW 20 MIN: CPT | Performed by: FAMILY MEDICINE

## 2024-12-10 NOTE — PROGRESS NOTES
Name: Jeanne Griffin      : 1980      MRN: [id number]  Encounter Provider: Lacho Arcos MD  Encounter Date: 12/10/2024   Encounter department: [facility]  :  Assessment & Plan  Primary osteoarthritis of left knee    Intermittent pain and swelling of left knee.  No giving way or locking.  No recent trauma or injury.    10/2022  x rays L knee  Mild osteoarthritis with narrowing of the medial tibiofemoral joint and small osteophytes seen.     Symptomatic treatment.  Consider PT, viscosupplementation, repeat x-rays for persistent symptoms         Cervicogenic headache    Significant improvement in headache/neck pain since starting chiropractic therapy.    Prior trigger point injections.    Follow up with Chiropractic Medicine          Screening for colon cancer    Orders:    Cologuard           History of Present Illness         CC follow up OV                          Review of Systems   Musculoskeletal:  Positive for arthralgias, myalgias and neck pain.   Neurological:  Positive for headaches. Negative for dizziness.        See HPI    Hematological:              Psychiatric/Behavioral:  Negative for sleep disturbance.        Objective   /74 (BP Location: Left arm, Patient Position: Sitting, Cuff Size: Large)   Pulse 69   Temp 98.1 °F (36.7 °C)   Resp 18   Ht 6' (1.829 m)   Wt 112 kg (246 lb)   SpO2 98%   BMI 33.36 kg/m²     Wt Readings from Last 3 Encounters:   12/10/24 112 kg (246 lb)   24 108 kg (238 lb)   10/25/24 108 kg (238 lb)          Physical Exam  Constitutional:       General: She is not in acute distress.  Musculoskeletal:      Left knee: Crepitus present. No bony tenderness. Normal range of motion. No tenderness.   Neurological:      Mental Status: She is alert.

## 2024-12-10 NOTE — ASSESSMENT & PLAN NOTE
Significant improvement in headache/neck pain since starting chiropractic therapy.    Prior trigger point injections.    Follow up with Chiropractic Medicine

## 2024-12-10 NOTE — ASSESSMENT & PLAN NOTE
Intermittent pain and swelling of left knee.  No giving way or locking.  No recent trauma or injury.    10/2022  x rays L knee  Mild osteoarthritis with narrowing of the medial tibiofemoral joint and small osteophytes seen.     Symptomatic treatment.  Consider PT, viscosupplementation, repeat x-rays for persistent symptoms

## 2025-01-03 ENCOUNTER — PROCEDURE VISIT (OUTPATIENT)
Age: 45
End: 2025-01-03
Payer: COMMERCIAL

## 2025-01-03 VITALS
WEIGHT: 246 LBS | HEART RATE: 84 BPM | SYSTOLIC BLOOD PRESSURE: 132 MMHG | HEIGHT: 72 IN | BODY MASS INDEX: 33.32 KG/M2 | DIASTOLIC BLOOD PRESSURE: 85 MMHG

## 2025-01-03 DIAGNOSIS — M54.2 NECK PAIN: Primary | ICD-10-CM

## 2025-01-03 DIAGNOSIS — G44.86 CERVICOGENIC HEADACHE: ICD-10-CM

## 2025-01-03 DIAGNOSIS — M79.18 MYOFASCIAL PAIN: ICD-10-CM

## 2025-01-03 PROCEDURE — 97110 THERAPEUTIC EXERCISES: CPT | Performed by: CHIROPRACTOR

## 2025-01-03 PROCEDURE — 98940 CHIROPRACT MANJ 1-2 REGIONS: CPT | Performed by: CHIROPRACTOR

## 2025-01-03 NOTE — PROGRESS NOTES
HPI:  Jeanne returns for treatment of ongoing neck pain and stiffness.  Feels overall better 2 on a pain scale today.  Stretching on a daily basis.  Denies any radicular complaints.      The following portions of the patient's history were reviewed and updated as appropriate: allergies, current medications, past family history, past medical history, past social history, past surgical history, and problem list.    Review of Systems    Physical Exam:  Exam reveals paracervical spasm stiffness but all palpation decreased cervical range of motion and endrange rotation bilaterally right lateral bending joint dysfunction C1-C2 C5-C6.  Scap thoracic dysfunction noted on the left.    Assessment:   Diagnosis ICD-10-CM Associated Orders   1. Neck pain  M54.2       2. Myofascial pain  M79.18       3. Cervicogenic headache  G44.86                 Treatment: 86165  Manipulation C5 C1 via seated stairstepping technique well-tolerated.    Therapeutic stretching applied to the bilateral shoulder girdles.  I used Graston technique.  I used GT 2, GT 3, and GT for instruments.  Bilateral Scap thoracic junctions addressed and mobilized.  Increasing shoulder range of motion noted posttreatment.  12-minute procedure.    Discussion:  Reviewed home stretching see her back for follow-up.    I would recommend a sit/stand desk if at all possible in her workplace.

## 2025-02-11 ENCOUNTER — OFFICE VISIT (OUTPATIENT)
Dept: FAMILY MEDICINE CLINIC | Facility: CLINIC | Age: 45
End: 2025-02-11
Payer: COMMERCIAL

## 2025-02-11 ENCOUNTER — TELEPHONE (OUTPATIENT)
Age: 45
End: 2025-02-11

## 2025-02-11 VITALS
SYSTOLIC BLOOD PRESSURE: 104 MMHG | BODY MASS INDEX: 32.91 KG/M2 | RESPIRATION RATE: 16 BRPM | HEART RATE: 63 BPM | HEIGHT: 72 IN | WEIGHT: 243 LBS | TEMPERATURE: 98.1 F | DIASTOLIC BLOOD PRESSURE: 68 MMHG | OXYGEN SATURATION: 100 %

## 2025-02-11 DIAGNOSIS — M54.32 SCIATICA, LEFT SIDE: Primary | ICD-10-CM

## 2025-02-11 PROCEDURE — 99213 OFFICE O/P EST LOW 20 MIN: CPT | Performed by: FAMILY MEDICINE

## 2025-02-11 RX ORDER — METHOCARBAMOL 500 MG/1
500 TABLET, FILM COATED ORAL
Qty: 30 TABLET | Refills: 0 | Status: SHIPPED | OUTPATIENT
Start: 2025-02-11

## 2025-02-11 RX ORDER — NAPROXEN 500 MG/1
500 TABLET ORAL 2 TIMES DAILY WITH MEALS
Qty: 30 TABLET | Refills: 0 | Status: SHIPPED | OUTPATIENT
Start: 2025-02-11

## 2025-02-11 NOTE — PROGRESS NOTES
Name: Jeanne Griffin      : 1980      MRN: [id number]  Encounter Provider: Moris Connors MD  Encounter Date: 2025   Encounter department: [facility]  :  Assessment & Plan  Sciatica, left side  Exercises provided to patient.  Heat for 20 minutes every 3-4 hours as tolerated. NSAIDs p.r.n..  Muscle relaxers can be taken as needed at bedtime.  Referral placed physical therapy counseled patient on side effects of medication.  Orders:    methocarbamol (ROBAXIN) 500 mg tablet; Take 1 tablet (500 mg total) by mouth daily at bedtime as needed for muscle spasms    naproxen (Naprosyn) 500 mg tablet; Take 1 tablet (500 mg total) by mouth 2 (two) times a day with meals    Ambulatory Referral to Physical Therapy; Future           History of Present Illness   Patient presents with:  Pain: Sca   Oct November   Ibrop did not help           Review of Systems   Constitutional:  Negative for fatigue and fever.   Genitourinary:         No loss of bladder or bowel control   Musculoskeletal:  Positive for back pain.        Pain goes into legs   Skin:  Negative for color change.   Neurological:  Negative for weakness.       Objective   /68 (BP Location: Left arm, Patient Position: Sitting, Cuff Size: Large)   Pulse 63   Temp 98.1 °F (36.7 °C) (Temporal)   Resp 16   Ht 6' (1.829 m)   Wt 110 kg (243 lb)   SpO2 100%   BMI 32.96 kg/m²      Physical Exam

## 2025-02-17 ENCOUNTER — TELEPHONE (OUTPATIENT)
Dept: DERMATOLOGY | Facility: CLINIC | Age: 45
End: 2025-02-17

## 2025-02-17 NOTE — TELEPHONE ENCOUNTER
NP -- acne scarring on face, back, chest and arms//// LVM advising appt for 7/29, Dr Anthony is cancelled and R/S to 4/17, resident , advised to callback to confirm or R/S, okay to schedule here per Tanya HDZ

## 2025-02-19 ENCOUNTER — RESULTS FOLLOW-UP (OUTPATIENT)
Dept: FAMILY MEDICINE CLINIC | Facility: CLINIC | Age: 45
End: 2025-02-19

## 2025-02-19 LAB — COLOGUARD RESULT REPORTABLE: NEGATIVE

## 2025-02-21 ENCOUNTER — PROCEDURE VISIT (OUTPATIENT)
Age: 45
End: 2025-02-21
Payer: COMMERCIAL

## 2025-02-21 VITALS
WEIGHT: 243 LBS | BODY MASS INDEX: 32.91 KG/M2 | SYSTOLIC BLOOD PRESSURE: 126 MMHG | HEIGHT: 72 IN | DIASTOLIC BLOOD PRESSURE: 84 MMHG | HEART RATE: 83 BPM

## 2025-02-21 DIAGNOSIS — M79.18 MYOFASCIAL PAIN: ICD-10-CM

## 2025-02-21 DIAGNOSIS — M54.2 NECK PAIN: Primary | ICD-10-CM

## 2025-02-21 DIAGNOSIS — G44.86 CERVICOGENIC HEADACHE: ICD-10-CM

## 2025-02-21 PROCEDURE — 97110 THERAPEUTIC EXERCISES: CPT | Performed by: CHIROPRACTOR

## 2025-02-21 PROCEDURE — 98940 CHIROPRACT MANJ 1-2 REGIONS: CPT | Performed by: CHIROPRACTOR

## 2025-02-21 NOTE — PROGRESS NOTES
HPI:  Jeanne returns for treatment of ongoing neck pain and stiffness.  Increased pain.  Under a lot of stress and tension pain level 7 on a pain scale.      The following portions of the patient's history were reviewed and updated as appropriate: allergies, current medications, past family history, past medical history, past social history, past surgical history, and problem list.    Review of Systems    Physical Exam:  Exam reveals paracervical spasm stiffness but all palpation decreased cervical range of motion and endrange rotation bilaterally right lateral bending joint dysfunction C1-C2 C5-C6.  Scap thoracic dysfunction noted on the left.    Assessment:   Diagnosis ICD-10-CM Associated Orders   1. Neck pain  M54.2       2. Myofascial pain  M79.18       3. Cervicogenic headache  G44.86                 Treatment: 06731  Manipulation C5 C1 via seated stairstepping technique well-tolerated.    Therapeutic stretching applied to the bilateral shoulder girdles.  I used Graston technique.  I used GT 2, GT 3, and GT for instruments.  Bilateral Scap thoracic junctions addressed and mobilized.  Increasing shoulder range of motion noted posttreatment.  12-minute procedure.    Discussion:  Reviewed home stretching see her back for follow-up.    I would recommend a sit/stand desk if at all possible in her workplace.

## 2025-02-24 ENCOUNTER — OFFICE VISIT (OUTPATIENT)
Dept: FAMILY MEDICINE CLINIC | Facility: CLINIC | Age: 45
End: 2025-02-24
Payer: COMMERCIAL

## 2025-02-24 VITALS
TEMPERATURE: 98 F | HEART RATE: 70 BPM | DIASTOLIC BLOOD PRESSURE: 74 MMHG | WEIGHT: 247.5 LBS | RESPIRATION RATE: 16 BRPM | OXYGEN SATURATION: 98 % | SYSTOLIC BLOOD PRESSURE: 112 MMHG | HEIGHT: 72 IN | BODY MASS INDEX: 33.52 KG/M2

## 2025-02-24 DIAGNOSIS — H92.02 EARACHE ON LEFT: Primary | ICD-10-CM

## 2025-02-24 PROCEDURE — 99213 OFFICE O/P EST LOW 20 MIN: CPT | Performed by: FAMILY MEDICINE

## 2025-02-24 NOTE — PROGRESS NOTES
Name: Jeanne Griffin      : 1980      MRN: [id number]  Encounter Provider: Moris Connors MD  Encounter Date: 2025   Encounter department: [facility]  :  Assessment & Plan  Earache on left  Ear unremarkable on examination.  Suspect patient's pain is secondary to teeth grinding.  She has an appointment scheduled with dentist for mouthguard fitting.  Trial mouthguard.  Can take naproxen as needed for ear pain.  If symptoms worsen or fail to improve follow-up in office.              History of Present Illness   Patient presents with:  Earache: Today   left    Does grind her teeth   Patient presents today with a earache on the left side.  Woke up with pain in the ear this morning.  Has some ringing in the ear.  Denies any fever.  Of note she does grind her teeth and has an appointment scheduled with her dentist for mouthguard fitting.  Denies any jaw clickin    Earache       Review of Systems   HENT:  Positive for ear pain.        Objective   /74 (BP Location: Left arm, Patient Position: Sitting, Cuff Size: Large)   Pulse 70   Temp 98 °F (36.7 °C) (Temporal)   Resp 16   Ht 6' (1.829 m)   Wt 112 kg (247 lb 8 oz)   SpO2 98%   BMI 33.57 kg/m²      Physical Exam  Vitals and nursing note reviewed.   Constitutional:       Appearance: Normal appearance. She is well-developed.   HENT:      Head: Normocephalic and atraumatic.   Cardiovascular:      Rate and Rhythm: Normal rate and regular rhythm.   Pulmonary:      Effort: Pulmonary effort is normal.      Breath sounds: Normal breath sounds.   Abdominal:      General: Bowel sounds are normal.      Palpations: Abdomen is soft.   Musculoskeletal:      Cervical back: Normal range of motion.   Skin:     General: Skin is warm.   Neurological:      General: No focal deficit present.      Mental Status: She is alert.   Psychiatric:         Mood and Affect: Mood normal.         Speech: Speech normal.

## 2025-02-28 ENCOUNTER — PROCEDURE VISIT (OUTPATIENT)
Age: 45
End: 2025-02-28
Payer: COMMERCIAL

## 2025-02-28 VITALS — HEIGHT: 72 IN | BODY MASS INDEX: 33.46 KG/M2 | WEIGHT: 247 LBS

## 2025-02-28 DIAGNOSIS — G44.86 CERVICOGENIC HEADACHE: ICD-10-CM

## 2025-02-28 DIAGNOSIS — G89.29 CHRONIC LEFT-SIDED LOW BACK PAIN WITH LEFT-SIDED SCIATICA: ICD-10-CM

## 2025-02-28 DIAGNOSIS — M79.18 MYOFASCIAL PAIN: ICD-10-CM

## 2025-02-28 DIAGNOSIS — M54.42 CHRONIC LEFT-SIDED LOW BACK PAIN WITH LEFT-SIDED SCIATICA: ICD-10-CM

## 2025-02-28 DIAGNOSIS — M54.2 NECK PAIN: Primary | ICD-10-CM

## 2025-02-28 PROCEDURE — 98941 CHIROPRACT MANJ 3-4 REGIONS: CPT | Performed by: CHIROPRACTOR

## 2025-02-28 PROCEDURE — 97110 THERAPEUTIC EXERCISES: CPT | Performed by: CHIROPRACTOR

## 2025-03-02 NOTE — PROGRESS NOTES
HPI:  Jeanne returns for treatment of ongoing neck pain and stiffness.  She reports this week her neck and shoulder is definitely better with decreased symptoms.  She does describe an exacerbation of her chronic low back condition where she has low back pain and at night paresthesias into the left lower extremity.  She had been diagnosed in the past with a disc bulge and feels the symptoms are very similar.  Denies any weaknesses denies any pain below the level of the knee reports no changes bowel bladder or any other yellow flag symptoms.  Denies any recent trauma.    The following portions of the patient's history were reviewed and updated as appropriate: allergies, current medications, past family history, past medical history, past social history, past surgical history, and problem list.    Review of Systems    Physical Exam:  Exam reveals paracervical spasm stiffness but all palpation decreased cervical range of motion and endrange rotation bilaterally right lateral bending joint dysfunction C1-C2 C5-C6.  Scap thoracic dysfunction noted on the left.    Exam reveals decreased lumbar range of motion in extension left lateral bending planes along with forward flexion.  Paralumbar hypertonicity noted.  Tenderness over the left parasacral region.  Biomechanically joint dysfunction left sacroiliac joint tenderness upon palpation L4-L5 L5-S1.    Neurologically stable without any evidence of focal deficit lower extremity.  Straight leg raising reproduces low back pain.    I was able to review a prior MRI lumbar spine revealing degenerative disc disease L4-L5 and L5-S1 some disc bulging left-sided at L4-L5 without significant nerve root encroachment.    Assessment:   Diagnosis ICD-10-CM Associated Orders   1. Neck pain  M54.2       2. Myofascial pain  M79.18       3. Cervicogenic headache  G44.86       4. Chronic left-sided low back pain with left-sided sciatica  M54.42     G89.29                 Treatment:  18040  Manipulation C5 C1 via seated stairstepping technique well-tolerated.  Manipulation to the left innominate, sacrum, L4 via gentle drop maneuver and flexion distraction well-tolerated.    Therapeutic stretching applied to the bilateral shoulder girdles.  I used Graston technique.  I used GT 2, GT 3, and GT for instruments.  Bilateral Scap thoracic junctions addressed and mobilized.  Increasing shoulder range of motion noted posttreatment.  12-minute procedure.    Discussion:  Discussed case in detail especially in regards to lumbar spine.  We were able to review the MRI discussing this pathology there.  Reviewed home program.  She will continue this along with the addition of icing 15 minutes per application to the lumbar spine.  Will concentrate on this next few visits reassess after this.

## 2025-03-31 ENCOUNTER — PROCEDURE VISIT (OUTPATIENT)
Age: 45
End: 2025-03-31
Payer: COMMERCIAL

## 2025-03-31 VITALS
SYSTOLIC BLOOD PRESSURE: 112 MMHG | WEIGHT: 247 LBS | HEIGHT: 72 IN | DIASTOLIC BLOOD PRESSURE: 77 MMHG | HEART RATE: 79 BPM | BODY MASS INDEX: 33.46 KG/M2

## 2025-03-31 DIAGNOSIS — M79.18 MYOFASCIAL PAIN: ICD-10-CM

## 2025-03-31 DIAGNOSIS — G89.29 CHRONIC LEFT-SIDED LOW BACK PAIN WITH LEFT-SIDED SCIATICA: ICD-10-CM

## 2025-03-31 DIAGNOSIS — M54.2 NECK PAIN: Primary | ICD-10-CM

## 2025-03-31 DIAGNOSIS — G44.86 CERVICOGENIC HEADACHE: ICD-10-CM

## 2025-03-31 DIAGNOSIS — M54.42 CHRONIC LEFT-SIDED LOW BACK PAIN WITH LEFT-SIDED SCIATICA: ICD-10-CM

## 2025-03-31 PROCEDURE — 98941 CHIROPRACT MANJ 3-4 REGIONS: CPT | Performed by: CHIROPRACTOR

## 2025-03-31 PROCEDURE — 97110 THERAPEUTIC EXERCISES: CPT | Performed by: CHIROPRACTOR

## 2025-03-31 NOTE — PROGRESS NOTES
HPI:  Jeanne returns for treatment of ongoing neck pain and stiffness.  She has low back pain as well.  She helped a friend move was doing a lot of bending twisting and turning this weekend and has been trying to stretch in the neck and upper back.    Denies any radicular symptoms.    The following portions of the patient's history were reviewed and updated as appropriate: allergies, current medications, past family history, past medical history, past social history, past surgical history, and problem list.    Review of Systems    Physical Exam:  Exam reveals paracervical spasm stiffness but all palpation decreased cervical range of motion and endrange rotation bilaterally right lateral bending joint dysfunction C1-C2 C5-C6.  Scap thoracic dysfunction noted on the left.    Exam reveals decreased lumbar range of motion in extension left lateral bending planes along with forward flexion.  Paralumbar hypertonicity noted.  Tenderness over the left parasacral region.  Biomechanically joint dysfunction left sacroiliac joint tenderness upon palpation L4-L5 L5-S1.    Neurologically stable without any evidence of focal deficit lower extremity.  Straight leg raising reproduces low back pain.        Assessment:   Diagnosis ICD-10-CM Associated Orders   1. Neck pain  M54.2       2. Myofascial pain  M79.18       3. Cervicogenic headache  G44.86       4. Chronic left-sided low back pain with left-sided sciatica  M54.42     G89.29                 Treatment: 42834  Manipulation C5 C1 via seated stairstepping technique well-tolerated.  Manipulation to the left innominate, sacrum, L4 via gentle drop maneuver and flexion distraction well-tolerated.    Therapeutic stretching applied to the bilateral shoulder girdles.  I used Graston technique.  I used GT 2, GT 3, and GT for instruments.  Bilateral Scap thoracic junctions addressed and mobilized.  Increasing shoulder range of motion noted posttreatment.  12-minute  procedure.    Discussion:  Reviewed icing instructions she will keep up with her daily stretching I will see her back for 2-week follow-up.

## 2025-04-08 ENCOUNTER — OFFICE VISIT (OUTPATIENT)
Dept: FAMILY MEDICINE CLINIC | Facility: CLINIC | Age: 45
End: 2025-04-08
Payer: COMMERCIAL

## 2025-04-08 VITALS
SYSTOLIC BLOOD PRESSURE: 112 MMHG | BODY MASS INDEX: 33.32 KG/M2 | WEIGHT: 246 LBS | HEIGHT: 72 IN | HEART RATE: 74 BPM | TEMPERATURE: 98.4 F | OXYGEN SATURATION: 100 % | DIASTOLIC BLOOD PRESSURE: 76 MMHG | RESPIRATION RATE: 16 BRPM

## 2025-04-08 DIAGNOSIS — Z12.31 ENCOUNTER FOR SCREENING MAMMOGRAM FOR BREAST CANCER: ICD-10-CM

## 2025-04-08 DIAGNOSIS — Z00.00 WELL ADULT EXAM: Primary | ICD-10-CM

## 2025-04-08 DIAGNOSIS — E66.9 OBESITY (BMI 30-39.9): ICD-10-CM

## 2025-04-08 PROCEDURE — 99396 PREV VISIT EST AGE 40-64: CPT | Performed by: FAMILY MEDICINE

## 2025-04-08 NOTE — PROGRESS NOTES
Name: Jeanne Griffin      : 1980      MRN: [id number]  Encounter Provider: Lacho Arcos MD  Encounter Date: 2025   Encounter department: [facility]  :  Assessment & Plan  Well adult exam    2025 Everetts guard negative      2024 Mammogram     2021 GYN exam/pap smear     Lipid screening > 5 years ago     10/2023 FBS 72     2018 TSH normal at 1.640       Orders:    Lipid panel    Hemoglobin A1C    Encounter for screening mammogram for breast cancer    Orders:    Mammo screening bilateral w 3d and cad; Future    Obesity (BMI 30-39.9)    BMI Counseling: Body mass index is 33.36 kg/m². The BMI is above normal. Nutrition recommendations include 3-5 servings of fruits/vegetables daily, moderation in carbohydrate intake, reducing intake of saturated fat and trans fat, and reducing intake of cholesterol. Exercise recommendations include exercising 3-5 times per week.          Follow up with GYN        History of Present Illness     CC well adult exam                        Review of Systems   Constitutional:  Negative for appetite change, chills, fatigue, fever and unexpected weight change.   HENT:  Negative for congestion, ear pain, rhinorrhea, sore throat and trouble swallowing.    Eyes:  Negative for visual disturbance.   Respiratory:  Negative for cough, shortness of breath and wheezing.    Cardiovascular:  Negative for chest pain, palpitations and leg swelling.   Gastrointestinal:  Negative for abdominal pain, blood in stool, constipation, diarrhea, nausea and vomiting.   Genitourinary:  Negative for difficulty urinating.   Musculoskeletal:  Positive for neck pain. Negative for arthralgias and myalgias.   Skin:  Negative for rash.   Neurological:  Positive for headaches. Negative for dizziness.        Chronic headaches resolved with trigger point injections/chiropractic  + FH brain aneurysm mother.       10/2023 MRA No intracranial aneurysm or major intracranial arterial stenosis.  Prominent hyperintensity in the pituitary fossa, measuring 0.7 cm, possibly volume averaging artifact/prominence of the posterior bright spot.     11/2023 MRI brain/pituitary- Stable prominent T1 shortening within the posterior and right aspect of the pituitary gland dating back to prior examinations since 2018. Overall appearance and morphology is unchanged. Differential diagnosis should include an atypical posterior   pituitary bright spot versus small Rathke's cleft cyst or adenoma adjacent to a posterior pituitary bright spot.     Incidental note is also made of a small focus of susceptibility within the pontomedullary junction, slightly increased in size since 3/3/2018. Findings may represent a small cavernoma or area of capillary telangiectasia.     No evidence for acute infarction. No abnormal parenchymal or leptomeningeal enhancement.   Hematological:  Negative for adenopathy. Does not bruise/bleed easily.        Lab Results       Component                Value               Date                       WBC                      8.24                10/18/2023                 HGB                      13.6                10/18/2023                 HCT                      38.9                10/18/2023                 MCV                      88                  10/18/2023                 PLT                      334                 10/18/2023              Lab Results       Component                Value               Date                       FERRITIN                 124                 10/18/2023            Lab Results       Component                Value               Date                       [id number]               694                 10/18/2023            Lab Results       Component                Value               Date                       FOLATE                   20.7                10/18/2023               Psychiatric/Behavioral:  Negative for dysphoric mood and sleep disturbance.        Objective    /76 (BP Location: Left arm, Patient Position: Sitting, Cuff Size: Standard)   Pulse 74   Temp 98.4 °F (36.9 °C) (Temporal)   Resp 16   Ht 6' (1.829 m)   Wt 112 kg (246 lb)   SpO2 100%   BMI 33.36 kg/m²      BP Readings from Last 3 Encounters:   04/08/25 112/76   03/31/25 112/77   02/24/25 112/74     Wt Readings from Last 3 Encounters:   04/08/25 112 kg (246 lb)   03/31/25 112 kg (247 lb)   02/28/25 112 kg (247 lb)         Physical Exam  Constitutional:       General: She is not in acute distress.  HENT:      Right Ear: Tympanic membrane and ear canal normal.      Left Ear: Tympanic membrane and ear canal normal.      Mouth/Throat:      Mouth: No oral lesions.      Pharynx: Oropharynx is clear.   Eyes:      General: No scleral icterus.     Extraocular Movements: Extraocular movements intact.      Conjunctiva/sclera: Conjunctivae normal.      Pupils: Pupils are equal, round, and reactive to light.   Neck:      Thyroid: No thyroid mass or thyromegaly.      Vascular: Normal carotid pulses. No carotid bruit.   Cardiovascular:      Rate and Rhythm: Normal rate and regular rhythm.      Heart sounds: No murmur heard.     No gallop.   Pulmonary:      Effort: Pulmonary effort is normal.      Breath sounds: Normal breath sounds. No wheezing or rales.   Musculoskeletal:      Right lower leg: No edema.      Left lower leg: No edema.   Lymphadenopathy:      Cervical: No cervical adenopathy.      Upper Body:      Right upper body: No supraclavicular adenopathy.      Left upper body: No supraclavicular adenopathy.   Skin:     Coloration: Skin is not cyanotic.      Findings: No rash.      Nails: There is no clubbing.   Neurological:      General: No focal deficit present.      Mental Status: She is alert and oriented to person, place, and time.   Psychiatric:         Mood and Affect: Mood normal.         Behavior: Behavior normal.

## 2025-04-17 ENCOUNTER — OFFICE VISIT (OUTPATIENT)
Dept: DERMATOLOGY | Facility: CLINIC | Age: 45
End: 2025-04-17

## 2025-04-17 VITALS — WEIGHT: 246 LBS | TEMPERATURE: 98.5 F | BODY MASS INDEX: 34.44 KG/M2 | HEIGHT: 71 IN

## 2025-04-17 DIAGNOSIS — L70.0 ACNE VULGARIS: Primary | ICD-10-CM

## 2025-04-17 DIAGNOSIS — L81.8 POST INFLAMMATORY HYPOPIGMENTATION: ICD-10-CM

## 2025-04-17 PROCEDURE — NC001 PR NO CHARGE: Performed by: DERMATOLOGY

## 2025-04-17 RX ORDER — HYDROQUINONE 40 MG/G
CREAM TOPICAL
Qty: 30 G | Refills: 6 | Status: SHIPPED | OUTPATIENT
Start: 2025-04-17

## 2025-04-17 RX ORDER — TRETINOIN 0.25 MG/G
CREAM TOPICAL
Qty: 30 G | Refills: 3 | Status: SHIPPED | OUTPATIENT
Start: 2025-04-17

## 2025-04-17 NOTE — PATIENT INSTRUCTIONS
"ACNE VULGARIS         TODAY'S PLAN:     PRESCRIPTION MANAGEMENT:  Several treatment options were discussed including topical retinoids and their side effects.     Skin Hygiene:      Wash affected areas (face, chest, and back) TWICE A DAY with a mild cleanser such as cerave or cetaphil.  Use only mild cleansers (hypoallergenic and without fragrances) and fragrance free detergent (not \"unscented\" products which contain a masking agent); we discussed avoiding irritants/fragranced products.  Apply a good oil-free facial moisturizer AT LEAST TWO TIMES A DAY \" such as Cerave, José Posay.  Minimize the application of oils and cosmetics to the affected skin.  This includes HAIR PRODUCTS such as \"leave in\" conditioners.  Unless the product specifically states that it \"won't cause acne,\" \"won't clog pores,\" and/or \"is non-comedogenic\" then it may actually CAUSE acne.  If you smoke, STOP. Nicotine increases sebum retention and increased scale within the follicles, forming comedones (blackheads and whiteheads).  Abrasive treatments such as dermabrasion and spa facials may aggravate inflammatory acne.  Do NOT scratch or pick your acne bumps.  The evidence that diet directly affects acne remains weak.  However, diet does affect your overall health.  Eat plenty of fresh fruit and vegetables.  Avoid protein or amino acid supplements, particularly if they contain leucine. Consider a low-glycemic, low-protein and low-dairy diet.  Be mindful that certain medications may cause of aggravate acne.  Make sure to tell your Dermatologist if you start a new prescription, nutritional supplement, and/or herbal remedy.  Consider using mineral based sun screen by Neutrogena or Eucerin or [place] Posay  Discussed hormonal oral therapy; Spirolactone  Start Benzoyl Peroxide wash 10 % leave on for at least five minutes before washing off                                 POST-INFLAMMATORY HYPERPIGMENTATION (\"PIH\")      Assessment and Plan:  Based " Instructions for Patient    Incision  Keep your incision clean and dry at all times.   It is okay to shower, just pat the incision dry   No submerging incision in water such as pools, hot tubs, or baths for at least 8 weeks and until the incision is healed  Do not apply lotions or ointments to incision    Activity  No lifting greater than 10 pounds. No bending, twisting, or overhead reaching.  Walking is the best way to start exercise after surgery. Take short frequent walks. You may gradually increase the distance as tolerated. If you feel pain, decrease your activity, but DO NOT stop walking. Walking will help you gain strength, prevent muscle soreness and spasms, and prevent blood clots.   Avoid bed rest and prolonged sitting for longer than 30 minutes (change positions frequently while awake)  No contact sports or high impact activities such as; running/jogging, snowmobile or 4 hogan riding or any other recreational vehicles until after given clearance at one of your follow up visits    Medications   Refills of pain medication:   Please call the neurosurgery clinic to request 2-3 days before you run out. A nurse will call you back to obtain a pain assessment.   Weaning from narcotic pain medications  When it is time, start weaning by extending the time between doses.   For example, if you're taking 2 tabs every 4 hours, spread it out to 2 tabs over 4.5, 5, 6 hours. At that point you can certainly cut down to 1 tab, then wean to an as needed basis until completely done with them.  Don't take more than 3000mg of Acetaminophen in 24 hours  Avoid Aspirin and NSAIDs (ex: ibuprofen/Advil) until given clearance (likely at the 12-week post-op appointment).  Encouraged icing for at least 3-4 times throughout the day for 20-30 minutes at a time. Avoid heat to the incision area.   Taking stool softeners regularly can reduce constipation commonly caused by narcotic pain medications.    Contact clinic or Emergency Room if  on a thorough discussion of this condition and the management approach to it (including a comprehensive discussion of the known risks, side effects and potential benefits of treatment), the patient (family) agrees to implement the following specific plan:  Tranexamic acid 4.8% ,kojic acid 2%, tretinoin 0.025%. Niacinamide 4%- [place] start applying everyday   Discussed going to the [facility] laser for collagen  Follow up 6 months with Dr Anthony  Discussed restarting tretinoin,       Spread one pea-sized amount of medication over entire face about one hour before bedtime.       Assessment and Plan:Assessment and Plan  Post-inflammatory hyperpigmentation (PIH) is a temporary darkening of the skin that follows injury such as a cut or burn, or inflammation of the skin following an infection or rash. It can occur in anyone, but most commonly affects darker skin types with greater frequency and severity.     Many types of inflammatory skin diseases and injuries can cause PIH, but the most common ones are acne vulgaris, atopic dermatitis, and impetigo. It is due to an overproduction of melanin and uneven transfer of pigment to surrounding keratinocytes. The exact mechanism is unknown, but it is shown to be stimulated by prostanoids, cytokines, chemokines, and other inflammatory mediators. Some medications may also darken postinflammatory pigmentation such as antimalarial drugs, clofazimine, tetracycline, anticancer drugs such as bleomycin, doxorubicin, 5-fluorouracil and busulfan.     Postinflammatory hyperpigmented patches are located at the site of original inflammation after the original condition has healed or resolved. They range from light brown to black in color and may become darker if exposed to sunlight and other sources of UV rays. Hyperpigmentation in the dermis has blue-grey appearance and may be permanent or resolve over a protracted period of time if left untreated.     The management of PIH should begin by  you develop:   Infection (redness, swelling, warmth, drainage, fever over 101 F)  New injury  Bladder or bowel changes or loss of control    Signs of blood clot:  Swelling and/or warmth in one or both legs  Pain or tenderness in your leg, ankle, foot, or arm   Red or discolored skin     Go to the Emergency Room   If sudden onset of severe headache, weakness, confusion, change in level of consciousness, pain, or loss of movement.  Chest pain  Trouble breathing     Post-operative appointments  Arrive 30 minutes before your 6 week and 3 months post-op appointments to allow time for an x-ray before each    [facility]  [facility]  [address] [place] [id number]  Telephone:  [phone]  Fax:  [phone]     first addressing the underlying inflammatory skin condition. It is important to be mindful that the treatment itself may exacerbate PIH by causing irritation. Treatments include the following:   At least three times a day application of SPF 50+ broad-spectrum sunscreen   Topical depigmenting agents such as hydroxyquinone, azelic acid, vitamin C cream, corticosteroid cream, kojic acid, licorice extract, and retinoids   Chemical peels  Laser treatments and intense pulsed light therapies for epidermal pigmentation can be used with higher risk of aggravating hyperpigmentation

## 2025-04-17 NOTE — PROGRESS NOTES
"[facility] Note     Patient Name: Jeanne Griffin  Encounter Date: 4/17/25       Have you been cared for by a [facility]ologist in the last 3 years and, if so, which description applies to you? NO. I am considered a \"new\" patient and must complete all patient intake questions. I am of child-bearing potential.     REVIEW OF SYSTEMS:  Have you recently had or currently have any of the following? Recent fever or chills? No  Any non-healing wound? No  Are you pregnant or planning to become pregnant? No  Are you currently or planning to be nursing or breast feeding? No   PAST MEDICAL HISTORY:  Have you personally ever had or currently have any of the following?  If \"YES,\" then please provide more detail. Skin cancer (such as Melanoma, Basal Cell Carcinoma, Squamous Cell Carcinoma?  No  Tuberculosis, HIV/AIDS, Hepatitis B or C: No  Radiation Treatment No   HISTORY OF IMMUNOSUPPRESSION:   Do you have a history of any of the following:  Systemic Immunosuppression such as Diabetes, Biologic or Immunotherapy, Chemotherapy, Organ Transplantation, Bone Marrow Transplantation or Prednsione?  No    Answering \"YES\" requires the addition of the dotphrase \"IMMUNOSUPPRESSED\" as the first diagnosis of the patient's visit.   FAMILY HISTORY:  Any \"first degree relatives\" (parent, brother, sister, or child) with the following?    Skin Cancer, Pancreatic or Other Cancer? Yes Breast cancer   PATIENT EXPERIENCE:    Do you want the Dermatologist to perform a COMPLETE skin exam today including a clinical examination under the \"bra and underwear\" areas?  NO  If necessary, do we have your permission to call and leave a detailed message on your Preferred Phone number that includes your specific medical information?  Yes      Allergies   Allergen Reactions    Pollen Extract       Current Outpatient Medications:     fluticasone (FLONASE) 50 mcg/act nasal spray, 1 spray into each nostril daily, Disp: 9.9 mL, Rfl: 0    " "methocarbamol (ROBAXIN) 500 mg tablet, Take 1 tablet (500 mg total) by mouth daily at bedtime as needed for muscle spasms, Disp: 30 tablet, Rfl: 0    VITAMIN D, CHOLECALCIFEROL, PO, if needed, Disp: , Rfl:               Whom besides the patient is providing clinical information about today's encounter?   NO ADDITIONAL HISTORIAN (patient alone provided history)    Physical Exam and Assessment/Plan by Diagnosis:    ACNE VULGARIS    Physical Exam:  Anatomic Locations Involved: Chest, Back, and OTHER: Neck  Global Assessment: ALMOST CLEAR: A few scattered comedones and a few small inflammatory papules.   Scarring Present? Yes; Atrophic scarring:  ALMOST CLEAR  Pertinent Positives:  Pertinent Negatives:    Additional History of Present Condition:  Patient presents for acne scarring. Patient has been using ISDIN products.        TODAY'S PLAN:     PRESCRIPTION MANAGEMENT:  Several treatment options were discussed including topical retinoids and their side effects.     Skin Hygiene:      Wash affected areas (face, chest, and back) TWICE A DAY with a mild cleanser such as La Roche Possay, cerave, cetaphil.  Use only mild cleansers (hypoallergenic and without fragrances) and fragrance free detergent (not \"unscented\" products which contain a masking agent); we discussed avoiding irritants/fragranced products.  Apply a good oil-free facial moisturizer AT LEAST TWO TIMES A DAY \" such as Cerave, José Posay.  Minimize the application of oils and cosmetics to the affected skin.  This includes HAIR PRODUCTS such as \"leave in\" conditioners.  Unless the product specifically states that it \"won't cause acne,\" \"won't clog pores,\" and/or \"is non-comedogenic\" then it may actually CAUSE acne.  If you smoke, STOP. Nicotine increases sebum retention and increased scale within the follicles, forming comedones (blackheads and whiteheads).  Abrasive treatments such as dermabrasion and spa facials may aggravate inflammatory acne.  Do NOT " "scratch or pick your acne bumps.  The evidence that diet directly affects acne remains weak.  However, diet does affect your overall health.  Eat plenty of fresh fruit and vegetables.  Avoid protein or amino acid supplements, particularly if they contain leucine. Consider a low-glycemic, low-protein and low-dairy diet.  Be mindful that certain medications may cause of aggravate acne.  Make sure to tell your Dermatologist if you start a new prescription, nutritional supplement, and/or herbal remedy.  Consider using mineral based sun screen by Neutrogena or Eucerin or [place] Posay  Discussed hormonal oral therapy; Spirolactone. Patient not interested at this time.   Start Benzoyl Peroxide wash 10 % leave on for at least five minutes before washing off. Use on chest and back.   Start topical tretinoin as below              POST-INFLAMMATORY HYPERPIGMENTATION (\"PIH\")      Physical Exam:  Anatomic Location Affected:   chest, back,neck  Morphological Description:  hyperpigmented macules at sites of previous inflammatory papules   Pertinent Positives:  Pertinent Negatives:    Additional History of Present Condition:  patient presents for acne scarring    Assessment and Plan:  Based on a thorough discussion of this condition and the management approach to it (including a comprehensive discussion of the known risks, side effects and potential benefits of treatment), the patient (family) agrees to implement the following specific plan:  Start topical Tranexamic acid 4.8% , azaelic acid 15%, kojic acid 6% cream once daily every day - sent through [facility]  Discussed restarting tretinoin 0.025% cream. Sent through [facility] as well. Discussed started every third night for a few weeks and increasing to every other night if tolerated. Discussed eventually increasing to nightly.   Discussed referral to medical spa for consideration for laser treatments for bere collagenesis   Follow up 6 months with Dr Anthony    Assessment and " Plan:Assessment and Plan  Post-inflammatory hyperpigmentation (PIH) is a temporary darkening of the skin that follows injury such as a cut or burn, or inflammation of the skin following an infection or rash. It can occur in anyone, but most commonly affects darker skin types with greater frequency and severity.     Many types of inflammatory skin diseases and injuries can cause PIH, but the most common ones are acne vulgaris, atopic dermatitis, and impetigo. It is due to an overproduction of melanin and uneven transfer of pigment to surrounding keratinocytes. The exact mechanism is unknown, but it is shown to be stimulated by prostanoids, cytokines, chemokines, and other inflammatory mediators. Some medications may also darken postinflammatory pigmentation such as antimalarial drugs, clofazimine, tetracycline, anticancer drugs such as bleomycin, doxorubicin, 5-fluorouracil and busulfan.     Postinflammatory hyperpigmented patches are located at the site of original inflammation after the original condition has healed or resolved. They range from light brown to black in color and may become darker if exposed to sunlight and other sources of UV rays. Hyperpigmentation in the dermis has blue-grey appearance and may be permanent or resolve over a protracted period of time if left untreated.     The management of PIH should begin by first addressing the underlying inflammatory skin condition. It is important to be mindful that the treatment itself may exacerbate PIH by causing irritation. Treatments include the following:   At least three times a day application of SPF 50+ broad-spectrum sunscreen   Topical depigmenting agents such as hydroxyquinone, azelic acid, vitamin C cream, corticosteroid cream, kojic acid, licorice extract, and retinoids   Chemical peels  Laser treatments and intense pulsed light therapies for epidermal pigmentation can be used with higher risk of aggravating hyperpigmentation          Scribe  Attestation      I,:  Sue Purcell MA am acting as a scribe while in the presence of the attending physician.:       I,:  Regina Hernandez MD personally performed the services described in this documentation    as scribed in my presence.:

## 2025-04-18 ENCOUNTER — PROCEDURE VISIT (OUTPATIENT)
Age: 45
End: 2025-04-18
Payer: COMMERCIAL

## 2025-04-18 VITALS — BODY MASS INDEX: 33.32 KG/M2 | HEIGHT: 72 IN | WEIGHT: 246 LBS

## 2025-04-18 DIAGNOSIS — G89.29 CHRONIC LEFT-SIDED LOW BACK PAIN WITH LEFT-SIDED SCIATICA: ICD-10-CM

## 2025-04-18 DIAGNOSIS — M54.2 NECK PAIN: Primary | ICD-10-CM

## 2025-04-18 DIAGNOSIS — G44.86 CERVICOGENIC HEADACHE: ICD-10-CM

## 2025-04-18 DIAGNOSIS — M54.42 CHRONIC LEFT-SIDED LOW BACK PAIN WITH LEFT-SIDED SCIATICA: ICD-10-CM

## 2025-04-18 DIAGNOSIS — M79.18 MYOFASCIAL PAIN: ICD-10-CM

## 2025-04-18 PROCEDURE — 98941 CHIROPRACT MANJ 3-4 REGIONS: CPT | Performed by: CHIROPRACTOR

## 2025-04-18 PROCEDURE — 97110 THERAPEUTIC EXERCISES: CPT | Performed by: CHIROPRACTOR

## 2025-04-21 NOTE — PROGRESS NOTES
HPI:  Jeanne returns for treatment of ongoing neck pain and stiffness.  She has low back pain as well.  3-4 on a pain scale.    Denies any radicular symptoms.    The following portions of the patient's history were reviewed and updated as appropriate: allergies, current medications, past family history, past medical history, past social history, past surgical history, and problem list.    Review of Systems    Physical Exam:  Exam reveals paracervical spasm stiffness but all palpation decreased cervical range of motion and endrange rotation bilaterally right lateral bending joint dysfunction C1-C2 C5-C6.  Scap thoracic dysfunction noted on the left.    Exam reveals decreased lumbar range of motion in extension left lateral bending planes along with forward flexion.  Paralumbar hypertonicity noted.  Tenderness over the left parasacral region.  Biomechanically joint dysfunction left sacroiliac joint tenderness upon palpation L4-L5 L5-S1.    Neurologically stable without any evidence of focal deficit lower extremity.  Straight leg raising reproduces low back pain.        Assessment:   Diagnosis ICD-10-CM Associated Orders   1. Neck pain  M54.2       2. Myofascial pain  M79.18       3. Cervicogenic headache  G44.86       4. Chronic left-sided low back pain with left-sided sciatica  M54.42     G89.29                 Treatment: 78261  Manipulation C5 C1 via seated stairstepping technique well-tolerated.  Manipulation to the left innominate, sacrum, L4 via gentle drop maneuver and flexion distraction well-tolerated.    Therapeutic stretching applied to the bilateral shoulder girdles.  I used Graston technique.  I used GT 2, GT 3, and GT for instruments.  Bilateral Scap thoracic junctions addressed and mobilized.  Increasing shoulder range of motion noted posttreatment.  12-minute procedure.    Discussion:  Reviewed icing instructions she will keep up with her daily stretching I will see her back for 2-week  follow-up.

## 2025-04-24 ENCOUNTER — HOSPITAL ENCOUNTER (OUTPATIENT)
Dept: MAMMOGRAPHY | Facility: MEDICAL CENTER | Age: 45
Discharge: HOME | End: 2025-04-24
Attending: FAMILY MEDICINE
Payer: COMMERCIAL

## 2025-04-24 VITALS — BODY MASS INDEX: 33.32 KG/M2 | WEIGHT: 246 LBS | HEIGHT: 72 IN

## 2025-04-24 DIAGNOSIS — Z12.31 ENCOUNTER FOR SCREENING MAMMOGRAM FOR BREAST CANCER: ICD-10-CM

## 2025-04-24 PROCEDURE — 77063 BREAST TOMOSYNTHESIS BI: CPT

## 2025-04-24 PROCEDURE — 77067 SCR MAMMO BI INCL CAD: CPT

## 2025-05-01 ENCOUNTER — RESULTS FOLLOW-UP (OUTPATIENT)
Dept: FAMILY MEDICINE CLINIC | Facility: CLINIC | Age: 45
End: 2025-05-01

## 2025-06-16 ENCOUNTER — OFFICE VISIT (OUTPATIENT)
Dept: FAMILY MEDICINE CLINIC | Facility: CLINIC | Age: 45
End: 2025-06-16
Payer: COMMERCIAL

## 2025-06-16 VITALS
WEIGHT: 246 LBS | TEMPERATURE: 98 F | BODY MASS INDEX: 33.36 KG/M2 | DIASTOLIC BLOOD PRESSURE: 76 MMHG | HEART RATE: 73 BPM | RESPIRATION RATE: 16 BRPM | SYSTOLIC BLOOD PRESSURE: 118 MMHG | OXYGEN SATURATION: 99 %

## 2025-06-16 DIAGNOSIS — L65.9 HAIR LOSS: ICD-10-CM

## 2025-06-16 DIAGNOSIS — R53.83 OTHER FATIGUE: Primary | ICD-10-CM

## 2025-06-16 DIAGNOSIS — E55.9 VITAMIN D DEFICIENCY: ICD-10-CM

## 2025-06-16 DIAGNOSIS — Z91.89 AT RISK FOR SLEEP APNEA: ICD-10-CM

## 2025-06-16 PROCEDURE — 99214 OFFICE O/P EST MOD 30 MIN: CPT | Performed by: FAMILY MEDICINE

## 2025-06-16 NOTE — ASSESSMENT & PLAN NOTE
History of vitamin D deficiency.  Repeat vit D level.  Could be contribute to underlying fatigue  Orders:    Vitamin D 25 hydroxy; Future

## 2025-06-16 NOTE — PROGRESS NOTES
Name: Jeanne Griffin      : 1980      MRN: [id number]  Encounter Provider: Moris Connors MD  Encounter Date: 2025   Encounter department: [facility]  :  Assessment & Plan  Vitamin D deficiency  History of vitamin D deficiency.  Repeat vit D level.  Could be contribute to underlying fatigue  Orders:    Vitamin D 25 hydroxy; Future    Hair loss  Obtain thyroid labs for new onset hair loss.  Hold any multivitamins prior to labs.  Orders:    TSH, 3rd generation; Future    T4, free; Future    Other fatigue  Fatigue likely multifactorial.  Obtain blood work listed below.  Rule out sleep apnea.  Will also obtain thyroid labs and vitamin D.  Orders:    CBC and Platelet; Future    Comprehensive metabolic panel; Future    Ambulatory Referral to Sleep Medicine; Future    At risk for sleep apnea  STOP-BAN    Worsening daytime fatigue- snoring.   Orders:    Ambulatory Referral to Sleep Medicine; Future           History of Present Illness   Patient presents with:  Fatigue: The last Month   Skin color and hair stressed     Patient presents today with worsening fatigue over the last month.  She has noticed some hair loss/thinning.  Her hairstylist informed her that her hair was breaking.  She does endorse some snoring.  Fatigue is worse during the day.  Blood pressure is currently stable.      Review of Systems   Constitutional:  Positive for fatigue. Negative for activity change and fever.        Hair loss/thinning   Eyes:  Negative for visual disturbance.   Respiratory:  Negative for shortness of breath.    Cardiovascular:  Negative for chest pain.   Gastrointestinal:  Negative for abdominal pain, constipation, diarrhea and nausea.   Endocrine: Negative for cold intolerance and heat intolerance.   Musculoskeletal:  Negative for back pain.   Skin:  Negative for rash.   Neurological:  Negative for headaches.   Psychiatric/Behavioral:  Negative for confusion.        Objective   /76  (BP Location: Left arm, Patient Position: Sitting, Cuff Size: Large)   Pulse 73   Temp 98 °F (36.7 °C) (Temporal)   Resp 16   Wt 112 kg (246 lb)   SpO2 99%   BMI 33.36 kg/m²      Physical Exam  Vitals and nursing note reviewed.   Constitutional:       Appearance: She is well-developed. She is obese.   HENT:      Head: Normocephalic and atraumatic.     Cardiovascular:      Rate and Rhythm: Normal rate and regular rhythm.   Pulmonary:      Effort: Pulmonary effort is normal.      Breath sounds: Normal breath sounds.   Abdominal:      General: Bowel sounds are normal.      Palpations: Abdomen is soft.     Musculoskeletal:      Cervical back: Normal range of motion.     Skin:     General: Skin is warm.     Neurological:      General: No focal deficit present.      Mental Status: She is alert.     Psychiatric:         Mood and Affect: Mood normal.         Speech: Speech normal.

## 2025-06-17 ENCOUNTER — APPOINTMENT (OUTPATIENT)
Dept: LAB | Facility: CLINIC | Age: 45
End: 2025-06-17
Payer: COMMERCIAL

## 2025-06-17 DIAGNOSIS — L65.9 HAIR LOSS: ICD-10-CM

## 2025-06-17 DIAGNOSIS — E55.9 VITAMIN D DEFICIENCY: ICD-10-CM

## 2025-06-17 DIAGNOSIS — R53.83 OTHER FATIGUE: ICD-10-CM

## 2025-06-17 LAB
25(OH)D3 SERPL-MCNC: 40.4 NG/ML (ref 30–100)
ALBUMIN SERPL BCG-MCNC: 4.3 G/DL (ref 3.5–5)
ALP SERPL-CCNC: 85 U/L (ref 34–104)
ALT SERPL W P-5'-P-CCNC: 38 U/L (ref 7–52)
ANION GAP SERPL CALCULATED.3IONS-SCNC: 9 MMOL/L (ref 4–13)
AST SERPL W P-5'-P-CCNC: 25 U/L (ref 13–39)
BILIRUB SERPL-MCNC: 0.51 MG/DL (ref 0.2–1)
BUN SERPL-MCNC: 15 MG/DL (ref 5–25)
CALCIUM SERPL-MCNC: 9.3 MG/DL (ref 8.4–10.2)
CHLORIDE SERPL-SCNC: 103 MMOL/L (ref 96–108)
CHOLEST SERPL-MCNC: 202 MG/DL (ref ?–200)
CO2 SERPL-SCNC: 24 MMOL/L (ref 21–32)
CREAT SERPL-MCNC: 0.82 MG/DL (ref 0.6–1.3)
ERYTHROCYTE [DISTWIDTH] IN BLOOD BY AUTOMATED COUNT: 12.5 % (ref 11.6–15.1)
EST. AVERAGE GLUCOSE BLD GHB EST-MCNC: 108 MG/DL
GFR SERPL CREATININE-BSD FRML MDRD: 86 ML/MIN/1.73SQ M
GLUCOSE P FAST SERPL-MCNC: 96 MG/DL (ref 65–99)
HBA1C MFR BLD: 5.4 %
HCT VFR BLD AUTO: 40 % (ref 34.8–46.1)
HDLC SERPL-MCNC: 53 MG/DL
HGB BLD-MCNC: 13.8 G/DL (ref 11.5–15.4)
LDLC SERPL CALC-MCNC: 111 MG/DL (ref 0–100)
MCH RBC QN AUTO: 30.5 PG (ref 26.8–34.3)
MCHC RBC AUTO-ENTMCNC: 34.5 G/DL (ref 31.4–37.4)
MCV RBC AUTO: 88 FL (ref 82–98)
NONHDLC SERPL-MCNC: 149 MG/DL
PLATELET # BLD AUTO: 282 THOUSANDS/UL (ref 149–390)
PMV BLD AUTO: 9.6 FL (ref 8.9–12.7)
POTASSIUM SERPL-SCNC: 4.1 MMOL/L (ref 3.5–5.3)
PROT SERPL-MCNC: 7.1 G/DL (ref 6.4–8.4)
RBC # BLD AUTO: 4.53 MILLION/UL (ref 3.81–5.12)
SODIUM SERPL-SCNC: 136 MMOL/L (ref 135–147)
T4 FREE SERPL-MCNC: 0.65 NG/DL (ref 0.61–1.12)
TRIGL SERPL-MCNC: 188 MG/DL (ref ?–150)
TSH SERPL DL<=0.05 MIU/L-ACNC: 1.97 UIU/ML (ref 0.45–4.5)
WBC # BLD AUTO: 7.04 THOUSAND/UL (ref 4.31–10.16)

## 2025-06-17 PROCEDURE — 36415 COLL VENOUS BLD VENIPUNCTURE: CPT

## 2025-06-17 PROCEDURE — 85027 COMPLETE CBC AUTOMATED: CPT

## 2025-06-17 PROCEDURE — 84443 ASSAY THYROID STIM HORMONE: CPT

## 2025-06-17 PROCEDURE — 82306 VITAMIN D 25 HYDROXY: CPT

## 2025-06-17 PROCEDURE — 80053 COMPREHEN METABOLIC PANEL: CPT

## 2025-06-17 PROCEDURE — 84439 ASSAY OF FREE THYROXINE: CPT

## 2025-06-18 ENCOUNTER — RESULTS FOLLOW-UP (OUTPATIENT)
Dept: FAMILY MEDICINE CLINIC | Facility: CLINIC | Age: 45
End: 2025-06-18

## 2025-06-23 ENCOUNTER — PATIENT MESSAGE (OUTPATIENT)
Dept: DERMATOLOGY | Facility: CLINIC | Age: 45
End: 2025-06-23

## 2025-07-29 ENCOUNTER — OFFICE VISIT (OUTPATIENT)
Dept: FAMILY MEDICINE CLINIC | Facility: CLINIC | Age: 45
End: 2025-07-29
Payer: COMMERCIAL

## 2025-07-29 VITALS
SYSTOLIC BLOOD PRESSURE: 98 MMHG | RESPIRATION RATE: 16 BRPM | WEIGHT: 250 LBS | DIASTOLIC BLOOD PRESSURE: 66 MMHG | TEMPERATURE: 97.9 F | HEART RATE: 71 BPM | OXYGEN SATURATION: 97 % | BODY MASS INDEX: 33.91 KG/M2

## 2025-07-29 DIAGNOSIS — M54.32 SCIATICA, LEFT SIDE: ICD-10-CM

## 2025-07-29 DIAGNOSIS — M54.16 LUMBAR RADICULOPATHY: Primary | ICD-10-CM

## 2025-07-29 DIAGNOSIS — E78.2 MIXED HYPERLIPIDEMIA: ICD-10-CM

## 2025-07-29 PROCEDURE — 99214 OFFICE O/P EST MOD 30 MIN: CPT | Performed by: FAMILY MEDICINE

## 2025-07-29 RX ORDER — METHOCARBAMOL 500 MG/1
500 TABLET, FILM COATED ORAL
Qty: 30 TABLET | Refills: 0 | Status: SHIPPED | OUTPATIENT
Start: 2025-07-29

## 2025-07-31 DIAGNOSIS — Z00.6 ENCOUNTER FOR EXAMINATION FOR NORMAL COMPARISON OR CONTROL IN CLINICAL RESEARCH PROGRAM: ICD-10-CM

## 2025-08-05 ENCOUNTER — PROCEDURE VISIT (OUTPATIENT)
Age: 45
End: 2025-08-05
Payer: COMMERCIAL

## 2025-08-05 ENCOUNTER — TELEPHONE (OUTPATIENT)
Age: 45
End: 2025-08-05

## 2025-08-15 ENCOUNTER — TELEPHONE (OUTPATIENT)
Age: 45
End: 2025-08-15

## 2025-08-19 ENCOUNTER — PROCEDURE VISIT (OUTPATIENT)
Age: 45
End: 2025-08-19
Payer: COMMERCIAL

## 2025-08-19 VITALS
WEIGHT: 250 LBS | BODY MASS INDEX: 33.86 KG/M2 | DIASTOLIC BLOOD PRESSURE: 82 MMHG | HEIGHT: 72 IN | SYSTOLIC BLOOD PRESSURE: 132 MMHG

## 2025-08-19 DIAGNOSIS — M54.16 LUMBAR RADICULOPATHY: ICD-10-CM

## 2025-08-19 DIAGNOSIS — M79.18 MYOFASCIAL PAIN: ICD-10-CM

## 2025-08-19 DIAGNOSIS — M54.42 CHRONIC LEFT-SIDED LOW BACK PAIN WITH LEFT-SIDED SCIATICA: ICD-10-CM

## 2025-08-19 DIAGNOSIS — G89.29 CHRONIC LEFT-SIDED LOW BACK PAIN WITH LEFT-SIDED SCIATICA: ICD-10-CM

## 2025-08-19 DIAGNOSIS — M54.2 NECK PAIN: Primary | ICD-10-CM

## 2025-08-19 PROCEDURE — 97110 THERAPEUTIC EXERCISES: CPT | Performed by: CHIROPRACTOR

## 2025-08-19 PROCEDURE — 99213 OFFICE O/P EST LOW 20 MIN: CPT | Performed by: CHIROPRACTOR

## 2025-08-19 PROCEDURE — 98941 CHIROPRACT MANJ 3-4 REGIONS: CPT | Performed by: CHIROPRACTOR
